# Patient Record
Sex: MALE | Race: WHITE | NOT HISPANIC OR LATINO | Employment: FULL TIME | URBAN - METROPOLITAN AREA
[De-identification: names, ages, dates, MRNs, and addresses within clinical notes are randomized per-mention and may not be internally consistent; named-entity substitution may affect disease eponyms.]

---

## 2017-09-22 ENCOUNTER — OFFICE VISIT (OUTPATIENT)
Dept: ORTHOPEDIC SURGERY | Facility: CLINIC | Age: 57
End: 2017-09-22

## 2017-09-22 VITALS
HEIGHT: 65 IN | BODY MASS INDEX: 30.32 KG/M2 | HEART RATE: 89 BPM | DIASTOLIC BLOOD PRESSURE: 101 MMHG | SYSTOLIC BLOOD PRESSURE: 164 MMHG | WEIGHT: 182 LBS

## 2017-09-22 DIAGNOSIS — M19.079 ARTHRITIS OF ANKLE: Primary | ICD-10-CM

## 2017-09-22 DIAGNOSIS — M17.12 OSTEOARTHROSIS, LOCALIZED, PRIMARY, KNEE, LEFT: ICD-10-CM

## 2017-09-22 PROCEDURE — 99213 OFFICE O/P EST LOW 20 MIN: CPT | Performed by: ORTHOPAEDIC SURGERY

## 2017-09-22 RX ORDER — BRIMONIDINE TARTRATE/TIMOLOL 0.2%-0.5%
DROPS OPHTHALMIC (EYE)
Refills: 0 | COMMUNITY
Start: 2017-08-22

## 2017-09-22 RX ORDER — OLOPATADINE HYDROCHLORIDE 7 MG/ML
SOLUTION OPHTHALMIC
Refills: 0 | COMMUNITY
Start: 2017-06-23 | End: 2019-09-27

## 2017-09-22 RX ORDER — IBUPROFEN 800 MG/1
800 TABLET ORAL 3 TIMES DAILY
Qty: 90 TABLET | Refills: 0 | Status: SHIPPED | OUTPATIENT
Start: 2017-09-22 | End: 2017-11-10 | Stop reason: SDUPTHER

## 2017-09-22 NOTE — PROGRESS NOTES
[facility] – [place], [place] Orthopaedic Surgery Clinic Note    Subjective     Chief Complaint   Patient presents with   • Left Knee - Pain   • Left Ankle - Pain        HPI      Kenn Hawthorne is a 57 y.o. male.  He has a 1 week history of severe left ankle pain and swelling.  He is limping.  It is worse with walking.  It is better icing.  Pain is 9 out of 10.  He's tried a brace.  No previous injury.  Associated with left knee pain.        History reviewed. No pertinent past medical history.   History reviewed. No pertinent surgical history.   Family History   Problem Relation Age of Onset   • Cancer Mother    • Stroke Father      Social History     Social History   • Marital status:      Spouse name: N/A   • Number of children: N/A   • Years of education: N/A     Occupational History   • Not on file.     Social History Main Topics   • Smoking status: Never Smoker   • Smokeless tobacco: Never Used   • Alcohol use Yes      Comment: OCC   • Drug use: No   • Sexual activity: Defer     Other Topics Concern   • Not on file     Social History Narrative   • No narrative on file      No current outpatient prescriptions on file prior to visit.     No current facility-administered medications on file prior to visit.       Allergies   Allergen Reactions   • Latex         The following portions of the patient's history were reviewed and updated as appropriate: allergies, current medications, past family history, past medical history, past social history, past surgical history and problem list.    Review of Systems   Constitutional: Negative.    HENT: Negative.    Eyes: Positive for redness.   Respiratory: Negative.    Cardiovascular: Negative.    Gastrointestinal: Negative.    Endocrine: Negative.    Genitourinary: Negative.    Musculoskeletal: Negative.    Skin: Negative.         -Rash   Allergic/Immunologic: Positive for food allergies.   Neurological: Negative.    Hematological: Negative.    Psychiatric/Behavioral: Negative.      "    Objective      Physical Exam  BP (!) 164/101  Pulse 89  Ht 65\" (165.1 cm)  Wt 182 lb (82.6 kg)  BMI 30.29 kg/m2    Body mass index is 30.29 kg/(m^2).        GENERAL APPEARANCE: awake, alert & oriented x 3, in no acute distress and well developed, well nourished  PSYCH: normal mood andaffect  LUNGS:  breathing nonlabored, no wheezing  EYES: sclera anicteric, pupils equal  CARDIOVASCULAR: palpable pulses dorsalis pedis, palpable posterior tibial bilaterally. Capillary refill less than 2 seconds  INTEGUMENTARY: skin intact, no clubbing, cyanosis  NEUROLOGIC:  Normal gait and balance            Ortho Exam  Peripheral Vascular:    Upper Extremity:   Inspection:  Left--no cyanotic nail beds Right--no cyanotic nail beds   Bilateral:  Pink nail beds with brisk capillary refill   Palpation:  Bilateral radial pulse normal    Musculoskeletal:  Global Assessment:  Overall assessment of Lower Extremity Muscle Strength and Tone:  Left quadriceps--5/5   Left hamstrings--5/5       Left tibialis anterior--5/5  Left gastroc-soleus--5/5  Left EHL --5/5    Lower Extremity:  Knee/Patella:  No digital clubbing or cyanosis.    Examination of left knee reveals:  Normal deep tendon reflexes, coordination, strength, tone, sensation.  No known fractures or deformities.    Inspection and Palpation:  Left knee:  Tenderness:  Over the medial joint line and moderate severity  Effusion:  none  Crepitus:  Positive  Pulses:  2+  Ecchymosis:  None  Warmth:  None     ROM:  Right:  Extension:5    Flexion:120  Left:  Extension:5     Flexion:120    Instability:    Left:  Lachman Test:  Negative, Varus stress test negative, Valgus stress test negative    Deformities/Malalignments/Discrepancies:    Left:  Genu Varum   Right:  No deformity    Functional Testing:  Emil's test:  Negative  Patella grind test:  Positive  Q-angle:  normalPeripheral Vascular:  Lower Extremity:  Inspection:  Left--rapid capillary refill  Palpation:  Dorsalis pedis " pulse:  Left--normal    Neurologic  Sensory:    Light Touch:     Left foot:  Dorsal intact and plantar intact    Overall Assessment of Muscle Strength and Tone:  Lower Extremities:       Left:  Tibialis anterior--5/5    Gastroc soleus--5/5    EHL--5/5    FHL--5/5    Musculoskeletal  Lower Extremity  Ankle/Foot:  Inspection and Palpation:        Left:  Tenderness: Diffuse    Swelling:none    Effusion: 1+  Crepitus:  None     ROM:     Left: Plantarflexion--50    Dorsiflexion--20    Inversion--10    Eversion--10    Instability:          Left: Anterior drawer test--negative    Squeeze test--negative    Imaging/Studies  Imaging Results (last 24 hours)     ** No results found for the last 24 hours. **      I reviewed X-rays left knee and are negative for fracture positive for Xeroform arthritis  I reviewed  X-rays left ankle show significant arthritic changes no acute fracture    Assessment/Plan      Kenn was seen today for pain and pain.    Diagnoses and all orders for this visit:    Arthritis of ankle    Osteoarthrosis, localized, primary, knee, left    Other orders  -     ibuprofen (ADVIL,MOTRIN) 800 MG tablet; Take 1 tablet by mouth 3 (Three) Times a Day.        He will follow up in 1 week    Medical Decision Making  Management Options : over-the-counter medicine  Data/Risk: radiology tests and independent visualization of imaging, lab tests, or EMG/NCV    Eugenio Soler MD  09/22/17  8:56 AM

## 2017-09-29 ENCOUNTER — OFFICE VISIT (OUTPATIENT)
Dept: ORTHOPEDIC SURGERY | Facility: CLINIC | Age: 57
End: 2017-09-29

## 2017-09-29 VITALS
WEIGHT: 182 LBS | DIASTOLIC BLOOD PRESSURE: 108 MMHG | HEIGHT: 66 IN | HEART RATE: 71 BPM | SYSTOLIC BLOOD PRESSURE: 168 MMHG | BODY MASS INDEX: 29.25 KG/M2

## 2017-09-29 DIAGNOSIS — M17.12 OSTEOARTHROSIS, LOCALIZED, PRIMARY, KNEE, LEFT: ICD-10-CM

## 2017-09-29 DIAGNOSIS — M19.079 ARTHRITIS OF ANKLE: Primary | ICD-10-CM

## 2017-09-29 PROCEDURE — 99212 OFFICE O/P EST SF 10 MIN: CPT | Performed by: ORTHOPAEDIC SURGERY

## 2017-09-29 NOTE — PROGRESS NOTES
[facility] – [place] Orthopaedic Surgery Clinic Note    Subjective     No chief complaint on file.       HPI      Kenn Hawthorne is a 57 y.o. male. He Is doing much better pain is 4 out of 10.  The boot and ibuprofen have helped.  It is worse with walking.      History reviewed. No pertinent past medical history.   History reviewed. No pertinent surgical history.   Family History   Problem Relation Age of Onset   • Cancer Mother    • Stroke Father      Social History     Social History   • Marital status:      Spouse name: N/A   • Number of children: N/A   • Years of education: N/A     Occupational History   • Not on file.     Social History Main Topics   • Smoking status: Never Smoker   • Smokeless tobacco: Never Used   • Alcohol use Yes      Comment: OCC   • Drug use: No   • Sexual activity: Defer     Other Topics Concern   • Not on file     Social History Narrative      Current Outpatient Prescriptions on File Prior to Visit   Medication Sig Dispense Refill   • COMBIGAN 0.2-0.5 % ophthalmic solution instill 1 drop into both eyes twice a day  0   • ibuprofen (ADVIL,MOTRIN) 800 MG tablet Take 1 tablet by mouth 3 (Three) Times a Day. 90 tablet 0   • PAZEO 0.7 % solution INSTILL 2 DROPS INTO AFFECTED EYE TWICE A DAY AS NEEDED  0   • triamcinolone (KENALOG) 0.1 % ointment   0     No current facility-administered medications on file prior to visit.       Allergies   Allergen Reactions   • Latex         The following portions of the patient's history were reviewed and updated as appropriate: allergies, current medications, past family history, past medical history, past social history, past surgical history and problem list.    Review of Systems   Constitutional: Negative.    HENT: Negative.    Eyes: Negative.    Respiratory: Negative.    Cardiovascular: Negative.    Gastrointestinal: Negative.    Endocrine: Negative.    Genitourinary: Negative.    Musculoskeletal: Positive for arthralgias and gait problem.   Skin: Negative.   "  Allergic/Immunologic: Negative.    Hematological: Negative.    Psychiatric/Behavioral: Negative.    All other systems reviewed and are negative.       Objective      Physical Exam  BP (!) 168/108  Pulse 71  Ht 66\" (167.6 cm)  Wt 182 lb (82.6 kg)  BMI 29.38 kg/m2    Body mass index is 29.38 kg/(m^2).        GENERAL APPEARANCE: awake, alert & oriented x 3, in no acute distress and well developed, well nourished  PSYCH: normal mood andaffect  LUNGS:  breathing nonlabored, no wheezing  EYES: sclera anicteric, pupils equal  CARDIOVASCULAR: palpable pulses dorsalis pedis, palpable posterior tibial bilaterally. Capillary refill less than 2 seconds  INTEGUMENTARY: skin intact, no clubbing, cyanosis  NEUROLOGIC:  Normal gait and balance            Ortho Exam  Peripheral Vascular:  Lower Extremity:  Inspection:  Left--rapid capillary refill  Palpation:  Dorsalis pedis pulse:  Left--normal    Neurologic  Sensory:    Light Touch:     Left foot:  Dorsal intact and plantar intact    Overall Assessment of Muscle Strength and Tone:  Lower Extremities:       Left:  Tibialis anterior--5/5    Gastroc soleus--5/5    EHL--5/5    FHL--5/5    Musculoskeletal  Lower Extremity  Ankle/Foot:  Inspection and Palpation:        Left:  Tenderness:none    Swelling:none    Effusion:  None    Crepitus:  None     ROM:     Left: Plantarflexion--50    Dorsiflexion--20    Inversion--10    Eversion--10    Instability:          Left: Anterior drawer test--negative    Squeeze test--negative    Imaging/Studies  Imaging Results (last 24 hours)     ** No results found for the last 24 hours. **          Assessment/Plan      Diagnoses and all orders for this visit:    Arthritis of ankle    Osteoarthrosis, localized, primary, knee, left      He will continue with the boot and ibuprofen and follow-up in 2 weeks. if not better consider physical therapy    Medical Decision Making  Management Options : prescription/IM medicine    Eugenio Soler, " MD  09/29/17  9:33 AM

## 2017-10-27 ENCOUNTER — OFFICE VISIT (OUTPATIENT)
Dept: ORTHOPEDIC SURGERY | Facility: CLINIC | Age: 57
End: 2017-10-27

## 2017-10-27 VITALS
HEART RATE: 62 BPM | HEIGHT: 66 IN | DIASTOLIC BLOOD PRESSURE: 99 MMHG | SYSTOLIC BLOOD PRESSURE: 150 MMHG | BODY MASS INDEX: 29.73 KG/M2 | WEIGHT: 185 LBS

## 2017-10-27 DIAGNOSIS — S93.492D SPRAIN OF ANTERIOR TALOFIBULAR LIGAMENT OF LEFT ANKLE, SUBSEQUENT ENCOUNTER: ICD-10-CM

## 2017-10-27 DIAGNOSIS — M19.079 ARTHRITIS OF ANKLE: Primary | ICD-10-CM

## 2017-10-27 PROCEDURE — 99213 OFFICE O/P EST LOW 20 MIN: CPT | Performed by: ORTHOPAEDIC SURGERY

## 2017-10-27 RX ORDER — CYCLOSPORINE 0.5 MG/ML
EMULSION OPHTHALMIC
Refills: 1 | COMMUNITY
Start: 2017-10-23 | End: 2021-08-14 | Stop reason: ALTCHOICE

## 2017-10-27 NOTE — PROGRESS NOTES
[facility] – [place] Orthopaedic Surgery Clinic Note    Subjective     Chief Complaint   Patient presents with   • Follow-up     4 week - Left ankle        HPI      Kenn Hawthorne is a 57 y.o. male.  Left ankle is doing better.  He had a few months.  Worse with walking and better with elevation.  There was ibuprofen 800 mg.  Pain is 3 out of 10 and all.        History reviewed. No pertinent past medical history.   No past surgical history on file.   Family History   Problem Relation Age of Onset   • Cancer Mother    • Stroke Father      Social History     Social History   • Marital status:      Spouse name: N/A   • Number of children: N/A   • Years of education: N/A     Occupational History   • Not on file.     Social History Main Topics   • Smoking status: Never Smoker   • Smokeless tobacco: Never Used   • Alcohol use Yes      Comment: OCC   • Drug use: No   • Sexual activity: Defer     Other Topics Concern   • Not on file     Social History Narrative      Current Outpatient Prescriptions on File Prior to Visit   Medication Sig Dispense Refill   • COMBIGAN 0.2-0.5 % ophthalmic solution instill 1 drop into both eyes twice a day  0   • ibuprofen (ADVIL,MOTRIN) 800 MG tablet Take 1 tablet by mouth 3 (Three) Times a Day. 90 tablet 0   • PAZEO 0.7 % solution INSTILL 2 DROPS INTO AFFECTED EYE TWICE A DAY AS NEEDED  0   • triamcinolone (KENALOG) 0.1 % ointment   0     No current facility-administered medications on file prior to visit.       Allergies   Allergen Reactions   • Latex         The following portions of the patient's history were reviewed and updated as appropriate: allergies, current medications, past family history, past medical history, past social history, past surgical history and problem list.    Review of Systems   Constitutional: Negative.    HENT: Negative.    Eyes: Negative.    Respiratory: Negative.    Cardiovascular: Negative.    Gastrointestinal: Negative.    Endocrine: Negative.    Genitourinary:  "Negative.    Musculoskeletal: Positive for arthralgias.   Skin: Negative.    Allergic/Immunologic: Negative.    Neurological: Negative.    Hematological: Negative.    Psychiatric/Behavioral: Negative.         Objective      Physical Exam  /99  Pulse 62  Ht 66\" (167.6 cm)  Wt 185 lb (83.9 kg)  BMI 29.86 kg/m2    Body mass index is 29.86 kg/(m^2).        GENERAL APPEARANCE: awake, alert & oriented x 3, in no acute distress and well developed, well nourished  PSYCH: normal mood andaffect  LUNGS:  breathing nonlabored, no wheezing  EYES: sclera anicteric, pupils equal  CARDIOVASCULAR: palpable pulses dorsalis pedis, palpable posterior tibial bilaterally. Capillary refill less than 2 seconds  INTEGUMENTARY: skin intact, no clubbing, cyanosis  NEUROLOGIC:  Normal gait and balance            Ortho Exam  Peripheral Vascular:  Lower Extremity:  Inspection:  Left--rapid capillary refill  Palpation:  Dorsalis pedis pulse:  Left--normal    Neurologic  Sensory:    Light Touch:     Left foot:  Dorsal intact and plantar intact    Overall Assessment of Muscle Strength and Tone:  Lower Extremities:       Left:  Tibialis anterior--5/5    Gastroc soleus--5/5    EHL--5/5    FHL--5/5    Musculoskeletal  Lower Extremity  Ankle/Foot:  Inspection and Palpation:        Left:  Tenderness:ATFL, posterior tibialis and Achilles    Swelling:none    Effusion:  None    Crepitus:  None     ROM:     Left: Plantarflexion--50    Dorsiflexion--20    Inversion--10    Eversion--10    Instability:          Left: Anterior drawer test--negative    Squeeze test--negative    Imaging/Studies  Imaging Results (last 24 hours)     ** No results found for the last 24 hours. **          Assessment/Plan      Kenn was seen today for follow-up.    Diagnoses and all orders for this visit:    Arthritis of ankle    Sprain of anterior talofibular ligament of left ankle, subsequent encounter      He will be physical therapy with [facility].  We gave him an ankle " brace and he will follow-up in 3 weeks he will continue ibuprofen    Medical Decision Making  Management Options : prescription/IM medicine and physical/occupational therapy      Eugenio Soler MD  10/27/17  11:01 AM

## 2017-11-12 RX ORDER — IBUPROFEN 800 MG/1
TABLET ORAL
Qty: 90 TABLET | Refills: 0 | Status: SHIPPED | OUTPATIENT
Start: 2017-11-12 | End: 2018-02-02 | Stop reason: SDUPTHER

## 2017-12-06 ENCOUNTER — TELEPHONE (OUTPATIENT)
Dept: CARDIOLOGY | Facility: CLINIC | Age: 57
End: 2017-12-06

## 2017-12-15 ENCOUNTER — OFFICE VISIT (OUTPATIENT)
Dept: ORTHOPEDIC SURGERY | Facility: CLINIC | Age: 57
End: 2017-12-15

## 2017-12-15 VITALS
BODY MASS INDEX: 29.76 KG/M2 | SYSTOLIC BLOOD PRESSURE: 153 MMHG | HEART RATE: 64 BPM | HEIGHT: 66 IN | WEIGHT: 185.19 LBS | DIASTOLIC BLOOD PRESSURE: 97 MMHG

## 2017-12-15 DIAGNOSIS — M19.079 ARTHRITIS OF ANKLE: Primary | ICD-10-CM

## 2017-12-15 PROCEDURE — 99212 OFFICE O/P EST SF 10 MIN: CPT | Performed by: ORTHOPAEDIC SURGERY

## 2017-12-15 RX ORDER — HYDROCHLOROTHIAZIDE 12.5 MG/1
TABLET ORAL
Refills: 0 | COMMUNITY
Start: 2017-11-02 | End: 2019-12-05 | Stop reason: SDUPTHER

## 2017-12-15 NOTE — PROGRESS NOTES
[facility] – [place] Orthopaedic Surgery Clinic Note    Subjective     Chief Complaint   Patient presents with   • Left Ankle - Follow-up     7 week follow up: Sprain of anterior talofibular ligament of left ankle, subsequent encounter, arthritis        HPI      Kenn Hawthorne is a 57 y.o. male.  He is doing much better.  The ankle started hurting on August last visit he was diagnosed with arthritis.  He goes to physical therapy.  Pain is 2 out of 10 and dull.  He believes he is getting better at therapy        History reviewed. No pertinent past medical history.   No past surgical history on file.   Family History   Problem Relation Age of Onset   • Cancer Mother    • Stroke Father      Social History     Social History   • Marital status:      Spouse name: N/A   • Number of children: N/A   • Years of education: N/A     Occupational History   • Not on file.     Social History Main Topics   • Smoking status: Never Smoker   • Smokeless tobacco: Never Used   • Alcohol use Yes      Comment: OCC   • Drug use: No   • Sexual activity: Defer     Other Topics Concern   • Not on file     Social History Narrative      Current Outpatient Prescriptions on File Prior to Visit   Medication Sig Dispense Refill   • COMBIGAN 0.2-0.5 % ophthalmic solution instill 1 drop into both eyes twice a day  0   • ibuprofen (ADVIL,MOTRIN) 800 MG tablet take 1 tablet by mouth three times a day 90 tablet 0   • PAZEO 0.7 % solution INSTILL 2 DROPS INTO AFFECTED EYE TWICE A DAY AS NEEDED  0   • RESTASIS 0.05 % ophthalmic emulsion instill 1 drop into both eyes twice a day  1   • triamcinolone (KENALOG) 0.1 % ointment   0     No current facility-administered medications on file prior to visit.       Allergies   Allergen Reactions   • Latex         The following portions of the patient's history were reviewed and updated as appropriate: allergies, current medications, past family history, past medical history, past social history, past surgical history and  "problem list.    Review of Systems   Constitutional: Negative.    HENT: Negative.    Eyes: Positive for pain, discharge and itching.   Respiratory: Negative.    Cardiovascular: Negative.    Gastrointestinal: Negative.    Endocrine: Negative.    Genitourinary: Negative.    Musculoskeletal: Positive for arthralgias.   Skin: Negative.    Allergic/Immunologic: Positive for environmental allergies and food allergies.   Neurological: Negative.    Hematological: Negative.    Psychiatric/Behavioral: Negative.         Objective      Physical Exam  /97  Pulse 64  Ht 167.6 cm (65.98\")  Wt 84 kg (185 lb 3 oz)  BMI 29.9 kg/m2    Body mass index is 29.9 kg/(m^2).        GENERAL APPEARANCE: awake, alert & oriented x 3, in no acute distress and well developed, well nourished  PSYCH: normal mood andaffect  LUNGS:  breathing nonlabored, no wheezing  EYES: sclera anicteric, pupils equal  CARDIOVASCULAR: palpable pulses dorsalis pedis, palpable posterior tibial bilaterally. Capillary refill less than 2 seconds  INTEGUMENTARY: skin intact, no clubbing, cyanosis  NEUROLOGIC:  Normal gait and balance            Ortho Exam  Peripheral Vascular:  Lower Extremity:  Inspection:  Left--rapid capillary refill  Palpation:  Dorsalis pedis pulse:  Left--normal    Neurologic  Sensory:    Light Touch:     Left foot:  Dorsal intact and plantar intact    Overall Assessment of Muscle Strength and Tone:  Lower Extremities:       Left:  Tibialis anterior--5/5    Gastroc soleus--5/5    EHL--5/5    FHL--5/5    Musculoskeletal  Lower Extremity  Ankle/Foot:  Inspection and Palpation:        Left:  Tenderness:none    Swelling:none    Effusion:  None    Crepitus:  None     ROM:     Left: Plantarflexion--50    Dorsiflexion--20    Inversion--10    Eversion--10    Instability:          Left: Anterior drawer test--negative    Squeeze test--negative    Imaging/Studies  Imaging Results (last 24 hours)     ** No results found for the last 24 hours. **    "       Assessment/Plan      Kenn was seen today for follow-up.    Diagnoses and all orders for this visit:    Arthritis of ankle  -     Ambulatory Referral to Physical Therapy        He would like to continue physical therapy and he will follow-up as needed.    Medical Decision Making  Management Options : over-the-counter medicine and physical/occupational therapy      Eugenio Soler MD  12/15/17  11:59 AM

## 2018-01-05 ENCOUNTER — APPOINTMENT (OUTPATIENT)
Dept: PHYSICAL THERAPY | Facility: HOSPITAL | Age: 58
End: 2018-01-05

## 2018-02-05 RX ORDER — IBUPROFEN 800 MG/1
TABLET ORAL
Qty: 90 TABLET | Refills: 0 | Status: SHIPPED | OUTPATIENT
Start: 2018-02-05 | End: 2018-06-13 | Stop reason: SDUPTHER

## 2018-06-14 RX ORDER — IBUPROFEN 800 MG/1
TABLET ORAL
Qty: 90 TABLET | Refills: 0 | Status: SHIPPED | OUTPATIENT
Start: 2018-06-14 | End: 2018-12-08 | Stop reason: SDUPTHER

## 2018-12-10 RX ORDER — IBUPROFEN 800 MG/1
TABLET ORAL
Qty: 90 TABLET | Refills: 0 | Status: SHIPPED | OUTPATIENT
Start: 2018-12-10 | End: 2019-07-06 | Stop reason: SDUPTHER

## 2019-07-08 RX ORDER — IBUPROFEN 800 MG/1
TABLET ORAL
Qty: 90 TABLET | Refills: 0 | Status: SHIPPED | OUTPATIENT
Start: 2019-07-08 | End: 2020-02-11

## 2019-09-27 ENCOUNTER — OFFICE VISIT (OUTPATIENT)
Dept: INTERNAL MEDICINE | Facility: CLINIC | Age: 59
End: 2019-09-27

## 2019-09-27 VITALS
TEMPERATURE: 98 F | BODY MASS INDEX: 30.99 KG/M2 | WEIGHT: 186 LBS | OXYGEN SATURATION: 97 % | SYSTOLIC BLOOD PRESSURE: 140 MMHG | HEART RATE: 70 BPM | DIASTOLIC BLOOD PRESSURE: 84 MMHG | HEIGHT: 65 IN

## 2019-09-27 DIAGNOSIS — R10.9 LEFT FLANK PAIN: Primary | ICD-10-CM

## 2019-09-27 LAB
BILIRUB BLD-MCNC: NEGATIVE MG/DL
CLARITY, POC: CLEAR
COLOR UR: YELLOW
GLUCOSE UR STRIP-MCNC: NEGATIVE MG/DL
KETONES UR QL: NEGATIVE
LEUKOCYTE EST, POC: NEGATIVE
NITRITE UR-MCNC: NEGATIVE MG/ML
PH UR: 5 [PH] (ref 5–8)
PROT UR STRIP-MCNC: NEGATIVE MG/DL
RBC # UR STRIP: NEGATIVE /UL
SP GR UR: 1.02 (ref 1–1.03)
UROBILINOGEN UR QL: NORMAL

## 2019-09-27 PROCEDURE — 81003 URINALYSIS AUTO W/O SCOPE: CPT | Performed by: NURSE PRACTITIONER

## 2019-09-27 PROCEDURE — 99213 OFFICE O/P EST LOW 20 MIN: CPT | Performed by: NURSE PRACTITIONER

## 2019-09-27 RX ORDER — NAPROXEN 500 MG/1
500 TABLET ORAL 2 TIMES DAILY PRN
Qty: 21 TABLET | Refills: 0 | Status: SHIPPED | OUTPATIENT
Start: 2019-09-27 | End: 2020-02-11

## 2019-09-27 RX ORDER — CYCLOBENZAPRINE HCL 10 MG
TABLET ORAL
Qty: 21 TABLET | Refills: 0 | Status: SHIPPED | OUTPATIENT
Start: 2019-09-27 | End: 2020-01-24 | Stop reason: SDUPTHER

## 2019-09-27 RX ORDER — EPINEPHRINE 0.3 MG/.3ML
INJECTION SUBCUTANEOUS
Refills: 0 | COMMUNITY
Start: 2019-09-20 | End: 2023-01-14

## 2019-10-25 ENCOUNTER — OFFICE VISIT (OUTPATIENT)
Dept: INTERNAL MEDICINE | Facility: CLINIC | Age: 59
End: 2019-10-25

## 2019-10-25 VITALS
HEIGHT: 65 IN | HEART RATE: 78 BPM | DIASTOLIC BLOOD PRESSURE: 72 MMHG | BODY MASS INDEX: 31.16 KG/M2 | WEIGHT: 187 LBS | OXYGEN SATURATION: 98 % | SYSTOLIC BLOOD PRESSURE: 120 MMHG | TEMPERATURE: 98.1 F

## 2019-10-25 DIAGNOSIS — Z00.00 ANNUAL PHYSICAL EXAM: Primary | ICD-10-CM

## 2019-10-25 PROCEDURE — 80061 LIPID PANEL: CPT | Performed by: NURSE PRACTITIONER

## 2019-10-25 PROCEDURE — 80048 BASIC METABOLIC PNL TOTAL CA: CPT | Performed by: NURSE PRACTITIONER

## 2019-10-25 PROCEDURE — 99396 PREV VISIT EST AGE 40-64: CPT | Performed by: NURSE PRACTITIONER

## 2019-10-25 PROCEDURE — G0103 PSA SCREENING: HCPCS | Performed by: NURSE PRACTITIONER

## 2019-10-25 PROCEDURE — 85027 COMPLETE CBC AUTOMATED: CPT | Performed by: NURSE PRACTITIONER

## 2019-10-25 PROCEDURE — 86803 HEPATITIS C AB TEST: CPT | Performed by: NURSE PRACTITIONER

## 2019-10-25 NOTE — PROGRESS NOTES
"Chief Complaint   Patient presents with   • Annual Exam       History of Present Illness    Kenn Hawthorne is a 59 y.o. male who presents today for annual physical exam.  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     The patient is being seen for a health maintenance evaluation.  The last health maintenance was 1 year(s) ago.    Social History  Kenn  does smoke CBD on occasion.   He drinks daily alcohol. 1 drink/day, beer and liquor.  He does not use illicit drugs.    General History  Kenn  does have regular dental visits. 2-3x/year, has apt scheduled for follow-up.  He does complain of vision problems. Last eye exam was July 2019. Only wears readers.  Immunizations are not up to date. The patient needs the following immunizations: Tdap, Shingrix, Influenza    Lifestyle  Kenn  consumes a in general, a \"healthy\" diet  , diet better during the wing. Only eats white meat typically, fresh produce. Drinks a lot of water.  He exercises daily. 20-30 minutes daily.    Reproductive Health  Kenn  is not sexually active.   He does not have erectile dysfunction.     Screening  Last PSA was never performed.  Family history of prostate cancer: none  Last testicular exam was self-performed.    Last colonoscopy was 2011, normal, polyps removed. Family history of colon cancer: mother  Other pertinent family history and/or screenings: none      [facility]    The following portions of the patient's history were reviewed and updated as appropriate: allergies, current medications, past family history, past medical history, past social history, past surgical history and problem list.     Social History     Tobacco Use   • Smoking status: Never Smoker   • Smokeless tobacco: Never Used   Substance Use Topics   • Alcohol use: Yes     Comment: daily beer and/or liquor       Past Medical History:   Diagnosis Date   • Eczema    • Glaucoma, left eye    • Hypertension        History reviewed. No pertinent surgical history.    Family " History   Problem Relation Age of Onset   • Cancer Mother         Colon   • Stroke Father        Allergies   Allergen Reactions   • Latex    • Penicillins Rash         Current Outpatient Medications:   •  COMBIGAN 0.2-0.5 % ophthalmic solution, instill 1 drop into both eyes twice a day, Disp: , Rfl: 0  •  cyclobenzaprine (FLEXERIL) 10 MG tablet, Take 1/2 to 1 tab PO BID PRN, Disp: 21 tablet, Rfl: 0  •  EPINEPHrine (EPIPEN) 0.3 MG/0.3ML solution auto-injector injection, inject 1 PEN INJECTOR intramuscularly as directed, Disp: , Rfl: 0  •  hydrochlorothiazide (HYDRODIURIL) 12.5 MG tablet, take 1 tablet by mouth twice a day, Disp: , Rfl: 0  •  ibuprofen (ADVIL,MOTRIN) 800 MG tablet, take 1 tablet by mouth three times a day, Disp: 90 tablet, Rfl: 0  •  naproxen (NAPROSYN) 500 MG tablet, Take 1 tablet by mouth 2 (Two) Times a Day As Needed for Mild Pain  or Moderate Pain ., Disp: 21 tablet, Rfl: 0  •  RESTASIS 0.05 % ophthalmic emulsion, instill 1 drop into both eyes twice a day, Disp: , Rfl: 1  •  TOBRADEX 0.3-0.1 % ophthalmic ointment, , Disp: , Rfl: 0  •  triamcinolone (KENALOG) 0.1 % ointment, , Disp: , Rfl: 0    Review of Systems  Review of Systems   Constitutional: Negative for appetite change, chills, diaphoresis, fatigue and fever.   HENT: Negative for congestion, ear pain, postnasal drip, rhinorrhea, sinus pressure, sinus pain, sore throat and trouble swallowing.    Eyes: Negative for pain, discharge, redness, itching and visual disturbance.   Respiratory: Negative for cough, chest tightness, shortness of breath and wheezing.    Cardiovascular: Negative for chest pain, palpitations and leg swelling.   Gastrointestinal: Negative for abdominal pain, blood in stool, constipation, diarrhea, nausea and vomiting.   Endocrine: Negative for cold intolerance, heat intolerance, polydipsia, polyphagia and polyuria.   Genitourinary: Negative for dysuria and hematuria.   Musculoskeletal: Negative for arthralgias, back pain  "and myalgias.   Skin: Negative for color change, rash and wound.   Allergic/Immunologic: Negative for environmental allergies and food allergies.   Neurological: Negative for dizziness, weakness, light-headedness, numbness and headaches.   Hematological: Does not bruise/bleed easily.   Psychiatric/Behavioral: Negative for confusion, dysphoric mood and sleep disturbance. The patient is not nervous/anxious.        Vitals:  Vitals:    10/25/19 1615   BP: 120/72   Pulse: 78   Temp: 98.1 °F (36.7 °C)   TempSrc: Oral   SpO2: 98%   Weight: 84.8 kg (187 lb)   Height: 165.1 cm (65\")       Physical Exam  Physical Exam   Constitutional: He is oriented to person, place, and time. Vital signs are normal. He appears well-developed and well-nourished. No distress.   HENT:   Head: Normocephalic and atraumatic.   Right Ear: External ear and ear canal normal. Tympanic membrane is not injected, not scarred, not perforated, not erythematous, not retracted and not bulging. A middle ear effusion is present.   Left Ear: External ear and ear canal normal. Tympanic membrane is not injected, not scarred, not perforated, not erythematous, not retracted and not bulging. A middle ear effusion is present.   Nose: Mucosal edema present. No rhinorrhea, sinus tenderness or congestion. Right sinus exhibits no maxillary sinus tenderness and no frontal sinus tenderness. Left sinus exhibits no maxillary sinus tenderness and no frontal sinus tenderness.   Mouth/Throat: Uvula is midline, oropharynx is clear and moist and mucous membranes are normal.   Eyes: Conjunctivae, EOM and lids are normal. Pupils are equal, round, and reactive to light.   Neck: Trachea normal, normal range of motion and phonation normal. Neck supple. Carotid bruit is not present. No tracheal deviation present. No thyroid mass and no thyromegaly present.   Cardiovascular: Normal rate, regular rhythm, normal heart sounds and intact distal pulses.   No murmur heard.  Pulses:       " Radial pulses are 2+ on the right side, and 2+ on the left side.        Dorsalis pedis pulses are 2+ on the right side, and 2+ on the left side.   No edema   Pulmonary/Chest: Effort normal and breath sounds normal. No respiratory distress. He has no decreased breath sounds. He has no wheezes. He exhibits no tenderness.   Abdominal: Soft. Normal appearance and bowel sounds are normal. He exhibits no distension and no mass. There is no hepatosplenomegaly. There is no tenderness. There is no rebound, no guarding and no CVA tenderness. No hernia.   Musculoskeletal: Normal range of motion. He exhibits no edema, tenderness or deformity.   Lymphadenopathy:        Head (right side): No submental, no submandibular, no tonsillar, no preauricular, no posterior auricular and no occipital adenopathy present.        Head (left side): No submental, no submandibular, no tonsillar, no preauricular, no posterior auricular and no occipital adenopathy present.     He has no cervical adenopathy.   Neurological: He is alert and oriented to person, place, and time. He has normal strength. No cranial nerve deficit. Coordination and gait normal.   Reflex Scores:       Patellar reflexes are 2+ on the right side and 2+ on the left side.  Skin: Skin is warm, dry and intact. Capillary refill takes 2 to 3 seconds. No rash noted.   Psychiatric: He has a normal mood and affect. His speech is normal and behavior is normal. Judgment and thought content normal. Cognition and memory are normal.   Nursing note and vitals reviewed.      Labs  Screening lab work ordered today    Assessment/Plan    Kenn was seen today for annual exam.    Diagnoses and all orders for this visit:    Annual physical exam  -     CBC (No Diff)  -     Basic metabolic panel  -     Lipid Panel  -     PSA SCREENING  -     Hepatitis C antibody    The patient is here for an annual health maintenance visit.  Currently, the patient consumes a healthy diet and has an adequate exercise  regimen. Screening lab work is ordered.  Immunizations discussed for health maintenance include: Shingrix, Tdap, Influenza. Advice and education is given regarding nutrition, aerobic exercise, routine dental evaluations, routine eye exams, reproductive health, cardiovascular risk reduction, sunscreen use, self skin examination (annual dermatology evaluations) and seat belt use (general overall safety).  Further recommendations after lab evaluation.  Annual wellness evaluations recommended.     Plan of care reviewed with patient at conclusion of today's visit. Patient education was provided regarding diagnosis, management, and prescribed or recommended OTC medications. Patient was informed to notify office of any new, worsening, or persistent symptoms. Patient verbalized understanding and agreement with plan of care.     Follow-Up  Return in about 1 year (around 10/25/2020) for Annual.    Electronically Signed By:  TIANNA Alonso

## 2019-10-26 LAB
ANION GAP SERPL CALCULATED.3IONS-SCNC: 12.4 MMOL/L (ref 5–15)
BUN BLD-MCNC: 21 MG/DL (ref 6–20)
BUN/CREAT SERPL: 18.9 (ref 7–25)
CALCIUM SPEC-SCNC: 9.3 MG/DL (ref 8.6–10.5)
CHLORIDE SERPL-SCNC: 95 MMOL/L (ref 98–107)
CHOLEST SERPL-MCNC: 337 MG/DL (ref 0–200)
CO2 SERPL-SCNC: 27.6 MMOL/L (ref 22–29)
CREAT BLD-MCNC: 1.11 MG/DL (ref 0.76–1.27)
DEPRECATED RDW RBC AUTO: 52.2 FL (ref 37–54)
ERYTHROCYTE [DISTWIDTH] IN BLOOD BY AUTOMATED COUNT: 17.8 % (ref 12.3–15.4)
GFR SERPL CREATININE-BSD FRML MDRD: 68 ML/MIN/1.73
GLUCOSE BLD-MCNC: 120 MG/DL (ref 65–99)
HCT VFR BLD AUTO: 39.6 % (ref 37.5–51)
HCV AB SER DONR QL: NORMAL
HDLC SERPL-MCNC: 97 MG/DL (ref 40–60)
HGB BLD-MCNC: 13.5 G/DL (ref 13–17.7)
LDLC SERPL CALC-MCNC: 191 MG/DL (ref 0–100)
LDLC/HDLC SERPL: 1.97 {RATIO}
MCH RBC QN AUTO: 28.1 PG (ref 26.6–33)
MCHC RBC AUTO-ENTMCNC: 34.1 G/DL (ref 31.5–35.7)
MCV RBC AUTO: 82.5 FL (ref 79–97)
PLATELET # BLD AUTO: 231 10*3/MM3 (ref 140–450)
PMV BLD AUTO: 10 FL (ref 6–12)
POTASSIUM BLD-SCNC: 4.1 MMOL/L (ref 3.5–5.2)
PSA SERPL-MCNC: 1.42 NG/ML (ref 0–4)
RBC # BLD AUTO: 4.8 10*6/MM3 (ref 4.14–5.8)
SODIUM BLD-SCNC: 135 MMOL/L (ref 136–145)
TRIGL SERPL-MCNC: 244 MG/DL (ref 0–150)
VLDLC SERPL-MCNC: 48.8 MG/DL (ref 5–40)
WBC NRBC COR # BLD: 7.55 10*3/MM3 (ref 3.4–10.8)

## 2019-12-05 ENCOUNTER — TELEPHONE (OUTPATIENT)
Dept: INTERNAL MEDICINE | Facility: CLINIC | Age: 59
End: 2019-12-05

## 2019-12-05 DIAGNOSIS — I10 HYPERTENSION, UNSPECIFIED TYPE: Primary | ICD-10-CM

## 2019-12-05 RX ORDER — HYDROCHLOROTHIAZIDE 12.5 MG/1
12.5 TABLET ORAL 2 TIMES DAILY
Qty: 60 TABLET | Refills: 0 | Status: SHIPPED | OUTPATIENT
Start: 2019-12-05 | End: 2020-01-25

## 2019-12-05 NOTE — TELEPHONE ENCOUNTER
Patient was calling to get a refill on his hydrochlorothiazide (hydrodiuril) 12.5 mg tablet. Patient has recently switched to [facility] and was previously having his medications filled at the old office but now he needs to have this office call this in to his confirmed pharmacy [facility] [address] [facility]. Patient only has 3 doses left which he will need it refilled by the weekend so he will have the needed dose for Monday please call patient with any questions or concerns [phone]

## 2019-12-30 ENCOUNTER — FLU SHOT (OUTPATIENT)
Dept: INTERNAL MEDICINE | Facility: CLINIC | Age: 59
End: 2019-12-30

## 2019-12-30 DIAGNOSIS — Z23 NEED FOR INFLUENZA VACCINATION: ICD-10-CM

## 2019-12-30 PROCEDURE — 90674 CCIIV4 VAC NO PRSV 0.5 ML IM: CPT | Performed by: NURSE PRACTITIONER

## 2019-12-30 PROCEDURE — 90471 IMMUNIZATION ADMIN: CPT | Performed by: NURSE PRACTITIONER

## 2020-01-24 ENCOUNTER — APPOINTMENT (OUTPATIENT)
Dept: LAB | Facility: HOSPITAL | Age: 60
End: 2020-01-24

## 2020-01-24 ENCOUNTER — OFFICE VISIT (OUTPATIENT)
Dept: INTERNAL MEDICINE | Facility: CLINIC | Age: 60
End: 2020-01-24

## 2020-01-24 VITALS
BODY MASS INDEX: 31.16 KG/M2 | OXYGEN SATURATION: 100 % | DIASTOLIC BLOOD PRESSURE: 90 MMHG | WEIGHT: 187 LBS | SYSTOLIC BLOOD PRESSURE: 140 MMHG | HEIGHT: 65 IN | HEART RATE: 98 BPM

## 2020-01-24 DIAGNOSIS — R10.12 LEFT UPPER QUADRANT PAIN: ICD-10-CM

## 2020-01-24 DIAGNOSIS — R10.11 RIGHT UPPER QUADRANT ABDOMINAL PAIN: ICD-10-CM

## 2020-01-24 DIAGNOSIS — R10.9 LEFT FLANK PAIN: Primary | ICD-10-CM

## 2020-01-24 DIAGNOSIS — I10 HYPERTENSION, UNSPECIFIED TYPE: ICD-10-CM

## 2020-01-24 LAB
ALBUMIN SERPL-MCNC: 4.5 G/DL (ref 3.5–5.2)
ALBUMIN/GLOB SERPL: 1.6 G/DL
ALP SERPL-CCNC: 71 U/L (ref 39–117)
ALT SERPL W P-5'-P-CCNC: 20 U/L (ref 1–41)
AMYLASE SERPL-CCNC: 71 U/L (ref 28–100)
ANION GAP SERPL CALCULATED.3IONS-SCNC: 16.7 MMOL/L (ref 5–15)
AST SERPL-CCNC: 25 U/L (ref 1–40)
BASOPHILS # BLD AUTO: 0.04 10*3/MM3 (ref 0–0.2)
BASOPHILS NFR BLD AUTO: 0.6 % (ref 0–1.5)
BILIRUB BLD-MCNC: NEGATIVE MG/DL
BILIRUB SERPL-MCNC: 0.6 MG/DL (ref 0.2–1.2)
BUN BLD-MCNC: 18 MG/DL (ref 6–20)
BUN/CREAT SERPL: 17.8 (ref 7–25)
CALCIUM SPEC-SCNC: 9.8 MG/DL (ref 8.6–10.5)
CHLORIDE SERPL-SCNC: 95 MMOL/L (ref 98–107)
CLARITY, POC: CLEAR
CO2 SERPL-SCNC: 27.3 MMOL/L (ref 22–29)
COLOR UR: YELLOW
CREAT BLD-MCNC: 1.01 MG/DL (ref 0.76–1.27)
CRP SERPL-MCNC: 2.27 MG/DL (ref 0–0.5)
DEPRECATED RDW RBC AUTO: 44.7 FL (ref 37–54)
EOSINOPHIL # BLD AUTO: 0.12 10*3/MM3 (ref 0–0.4)
EOSINOPHIL NFR BLD AUTO: 1.7 % (ref 0.3–6.2)
ERYTHROCYTE [DISTWIDTH] IN BLOOD BY AUTOMATED COUNT: 14.9 % (ref 12.3–15.4)
ERYTHROCYTE [SEDIMENTATION RATE] IN BLOOD: 30 MM/HR (ref 0–20)
GFR SERPL CREATININE-BSD FRML MDRD: 76 ML/MIN/1.73
GLOBULIN UR ELPH-MCNC: 2.8 GM/DL
GLUCOSE BLD-MCNC: 115 MG/DL (ref 65–99)
GLUCOSE UR STRIP-MCNC: NEGATIVE MG/DL
HCT VFR BLD AUTO: 40.7 % (ref 37.5–51)
HGB BLD-MCNC: 13.3 G/DL (ref 13–17.7)
IMM GRANULOCYTES # BLD AUTO: 0.05 10*3/MM3 (ref 0–0.05)
IMM GRANULOCYTES NFR BLD AUTO: 0.7 % (ref 0–0.5)
KETONES UR QL: NEGATIVE
LEUKOCYTE EST, POC: NEGATIVE
LYMPHOCYTES # BLD AUTO: 1.21 10*3/MM3 (ref 0.7–3.1)
LYMPHOCYTES NFR BLD AUTO: 17.2 % (ref 19.6–45.3)
MCH RBC QN AUTO: 27.5 PG (ref 26.6–33)
MCHC RBC AUTO-ENTMCNC: 32.7 G/DL (ref 31.5–35.7)
MCV RBC AUTO: 84.1 FL (ref 79–97)
MONOCYTES # BLD AUTO: 0.87 10*3/MM3 (ref 0.1–0.9)
MONOCYTES NFR BLD AUTO: 12.4 % (ref 5–12)
NEUTROPHILS # BLD AUTO: 4.73 10*3/MM3 (ref 1.7–7)
NEUTROPHILS NFR BLD AUTO: 67.4 % (ref 42.7–76)
NITRITE UR-MCNC: NEGATIVE MG/ML
NRBC BLD AUTO-RTO: 0 /100 WBC (ref 0–0.2)
PH UR: 5.5 [PH] (ref 5–8)
PLATELET # BLD AUTO: 296 10*3/MM3 (ref 140–450)
PMV BLD AUTO: 10.4 FL (ref 6–12)
POTASSIUM BLD-SCNC: 4 MMOL/L (ref 3.5–5.2)
PROT SERPL-MCNC: 7.3 G/DL (ref 6–8.5)
PROT UR STRIP-MCNC: NEGATIVE MG/DL
RBC # BLD AUTO: 4.84 10*6/MM3 (ref 4.14–5.8)
RBC # UR STRIP: NEGATIVE /UL
SODIUM BLD-SCNC: 139 MMOL/L (ref 136–145)
SP GR UR: 1.02 (ref 1–1.03)
UROBILINOGEN UR QL: NORMAL
WBC NRBC COR # BLD: 7.02 10*3/MM3 (ref 3.4–10.8)

## 2020-01-24 PROCEDURE — 99214 OFFICE O/P EST MOD 30 MIN: CPT | Performed by: NURSE PRACTITIONER

## 2020-01-24 PROCEDURE — 85025 COMPLETE CBC W/AUTO DIFF WBC: CPT | Performed by: NURSE PRACTITIONER

## 2020-01-24 PROCEDURE — 86140 C-REACTIVE PROTEIN: CPT | Performed by: NURSE PRACTITIONER

## 2020-01-24 PROCEDURE — 82150 ASSAY OF AMYLASE: CPT | Performed by: NURSE PRACTITIONER

## 2020-01-24 PROCEDURE — 81003 URINALYSIS AUTO W/O SCOPE: CPT | Performed by: NURSE PRACTITIONER

## 2020-01-24 PROCEDURE — 85652 RBC SED RATE AUTOMATED: CPT | Performed by: NURSE PRACTITIONER

## 2020-01-24 PROCEDURE — 80053 COMPREHEN METABOLIC PANEL: CPT | Performed by: NURSE PRACTITIONER

## 2020-01-24 PROCEDURE — 36415 COLL VENOUS BLD VENIPUNCTURE: CPT | Performed by: NURSE PRACTITIONER

## 2020-01-24 RX ORDER — CYCLOBENZAPRINE HCL 10 MG
TABLET ORAL
Qty: 21 TABLET | Refills: 0 | Status: SHIPPED | OUTPATIENT
Start: 2020-01-24 | End: 2020-06-18

## 2020-01-24 NOTE — PROGRESS NOTES
Chief Complaint   Patient presents with   • Flank Pain     radiating to anterior rib cage       History of Present Illness  59 y.o.male presents for flank pain.  Left sided flank that is radiating left lateral and around into left upper abdomen at the level of his rib cage. Has been happening since Oct 2019. It has never really gone away it is always the sometimes worse/better. Was seen in Oct with mild relief with nsaids and flexaril. This time he was running up steps when it got significantly worse. Pain is severe 10/10. Just breathing heavy hurts; movement makes worse. Associated symptoms include headaches and nausea. Pertinent negatives include no abdominal pain, chest pain, constipation, diarrhea, dysuria, fatigue, fever, shortness of breath, urinary symptoms, or known fever. Pt does drink significant alcohol (at least 1 beer daily and a couple of stronger alcoholic drinks). Pt takes Dupixent medication for his eczema once every two weeks; had a dose yesterday.  Colonoscopy due 2021      Review of Systems   Constitutional: Negative for chills, diaphoresis, fatigue and fever.   Eyes: Negative for visual disturbance.   Respiratory: Negative for chest tightness and shortness of breath.    Cardiovascular: Negative for chest pain.   Gastrointestinal: Positive for abdominal distention and nausea. Negative for abdominal pain, constipation, diarrhea, vomiting and indigestion.   Endocrine: Positive for polyuria.   Genitourinary: Positive for flank pain. Negative for difficulty urinating, dysuria and frequency.   Musculoskeletal: Positive for back pain.   Skin: Negative for bruise.   Neurological: Positive for headache. Negative for dizziness, syncope and light-headedness.   Psychiatric/Behavioral: Negative for dysphoric mood and depressed mood.         [facility]  The following portions of the patient's history were reviewed and updated as appropriate: allergies, current medications, past family history, past medical  "history, past social history, past surgical history and problem list.     Past Medical History:   Diagnosis Date   • Eczema    • Glaucoma, left eye    • Hypertension       History reviewed. No pertinent surgical history.   Allergies   Allergen Reactions   • Latex    • Penicillins Rash      Social History     Socioeconomic History   • Marital status:      Spouse name: Not on file   • Number of children: Not on file   • Years of education: Not on file   • Highest education level: Not on file   Tobacco Use   • Smoking status: Never Smoker   • Smokeless tobacco: Never Used   Substance and Sexual Activity   • Alcohol use: Yes     Comment: daily beer and/or liquor   • Drug use: No   • Sexual activity: Not Currently     Family History   Problem Relation Age of Onset   • Cancer Mother         Colon   • Stroke Father             Current Outpatient Medications:   •  COMBIGAN 0.2-0.5 % ophthalmic solution, instill 1 drop into both eyes twice a day, Disp: , Rfl: 0  •  cyclobenzaprine (FLEXERIL) 10 MG tablet, Take 1/2 to 1 tab PO BID PRN, Disp: 21 tablet, Rfl: 0  •  EPINEPHrine (EPIPEN) 0.3 MG/0.3ML solution auto-injector injection, inject 1 PEN INJECTOR intramuscularly as directed, Disp: , Rfl: 0  •  hydroCHLOROthiazide (HYDRODIURIL) 12.5 MG tablet, Take 1 tablet by mouth 2 (Two) Times a Day., Disp: 60 tablet, Rfl: 0  •  ibuprofen (ADVIL,MOTRIN) 800 MG tablet, take 1 tablet by mouth three times a day, Disp: 90 tablet, Rfl: 0  •  naproxen (NAPROSYN) 500 MG tablet, Take 1 tablet by mouth 2 (Two) Times a Day As Needed for Mild Pain  or Moderate Pain ., Disp: 21 tablet, Rfl: 0  •  RESTASIS 0.05 % ophthalmic emulsion, instill 1 drop into both eyes twice a day, Disp: , Rfl: 1  •  TOBRADEX 0.3-0.1 % ophthalmic ointment, , Disp: , Rfl: 0  •  triamcinolone (KENALOG) 0.1 % ointment, , Disp: , Rfl: 0    VITALS:  /90   Pulse 98   Ht 165.1 cm (65\")   Wt 84.8 kg (187 lb)   SpO2 100%   BMI 31.12 kg/m²     Physical Exam "   Constitutional: He is oriented to person, place, and time. He appears well-developed and well-nourished. No distress.   HENT:   Head: Normocephalic and atraumatic.   Eyes: Lids are normal.   Eyes appeared dry with yellowing of sclera   Cardiovascular: Normal rate, regular rhythm and normal heart sounds.   No murmur heard.  Pulmonary/Chest: Effort normal and breath sounds normal. No respiratory distress.   Abdominal: Soft. Bowel sounds are normal. He exhibits no distension. There is tenderness in the right upper quadrant and left upper quadrant. There is CVA tenderness. There is no rigidity, no rebound and no guarding.   Tenderness at the right and left anterior abdomen at the level of lower rib cage. Mild hepatic enlargement. Mild left CVA tenderness.    Musculoskeletal: He exhibits tenderness. He exhibits no edema or deformity.   Movement is difficult for pt due to level of pain he is experiencing. Tenderness at left flank radiating to anterior rib cage   Lymphadenopathy:     He has no cervical adenopathy.   Neurological: He is alert and oriented to person, place, and time.   Skin: Skin is warm and dry. No rash noted.   Psychiatric: He has a normal mood and affect. His behavior is normal.   Nursing note and vitals reviewed.      LABS  Results for orders placed or performed in visit on 01/24/20   Comprehensive Metabolic Panel   Result Value Ref Range    Glucose 115 (H) 65 - 99 mg/dL    BUN 18 6 - 20 mg/dL    Creatinine 1.01 0.76 - 1.27 mg/dL    Sodium 139 136 - 145 mmol/L    Potassium 4.0 3.5 - 5.2 mmol/L    Chloride 95 (L) 98 - 107 mmol/L    CO2 27.3 22.0 - 29.0 mmol/L    Calcium 9.8 8.6 - 10.5 mg/dL    Total Protein 7.3 6.0 - 8.5 g/dL    Albumin 4.50 3.50 - 5.20 g/dL    ALT (SGPT) 20 1 - 41 U/L    AST (SGOT) 25 1 - 40 U/L    Alkaline Phosphatase 71 39 - 117 U/L    Total Bilirubin 0.6 0.2 - 1.2 mg/dL    eGFR Non African Amer 76 >60 mL/min/1.73    Globulin 2.8 gm/dL    A/G Ratio 1.6 g/dL    BUN/Creatinine Ratio  17.8 7.0 - 25.0    Anion Gap 16.7 (H) 5.0 - 15.0 mmol/L   Amylase   Result Value Ref Range    Amylase 71 28 - 100 U/L   Sedimentation Rate   Result Value Ref Range    Sed Rate 30 (H) 0 - 20 mm/hr   C-reactive Protein   Result Value Ref Range    C-Reactive Protein 2.27 (H) 0.00 - 0.50 mg/dL   CBC Auto Differential   Result Value Ref Range    WBC 7.02 3.40 - 10.80 10*3/mm3    RBC 4.84 4.14 - 5.80 10*6/mm3    Hemoglobin 13.3 13.0 - 17.7 g/dL    Hematocrit 40.7 37.5 - 51.0 %    MCV 84.1 79.0 - 97.0 fL    MCH 27.5 26.6 - 33.0 pg    MCHC 32.7 31.5 - 35.7 g/dL    RDW 14.9 12.3 - 15.4 %    RDW-SD 44.7 37.0 - 54.0 fl    MPV 10.4 6.0 - 12.0 fL    Platelets 296 140 - 450 10*3/mm3    Neutrophil % 67.4 42.7 - 76.0 %    Lymphocyte % 17.2 (L) 19.6 - 45.3 %    Monocyte % 12.4 (H) 5.0 - 12.0 %    Eosinophil % 1.7 0.3 - 6.2 %    Basophil % 0.6 0.0 - 1.5 %    Immature Grans % 0.7 (H) 0.0 - 0.5 %    Neutrophils, Absolute 4.73 1.70 - 7.00 10*3/mm3    Lymphocytes, Absolute 1.21 0.70 - 3.10 10*3/mm3    Monocytes, Absolute 0.87 0.10 - 0.90 10*3/mm3    Eosinophils, Absolute 0.12 0.00 - 0.40 10*3/mm3    Basophils, Absolute 0.04 0.00 - 0.20 10*3/mm3    Immature Grans, Absolute 0.05 0.00 - 0.05 10*3/mm3    nRBC 0.0 0.0 - 0.2 /100 WBC   POC Urinalysis Dipstick, Automated   Result Value Ref Range    Color Yellow Yellow, Straw, Dark Yellow, [place]    Clarity, UA Clear Clear    Specific Gravity  1.025 1.005 - 1.030    pH, Urine 5.5 5.0 - 8.0    Leukocytes Negative Negative    Nitrite, UA Negative Negative    Protein, POC Negative Negative mg/dL    Glucose, UA Negative Negative, 1000 mg/dL (3+) mg/dL    Ketones, UA Negative Negative    Urobilinogen, UA Normal Normal    Bilirubin Negative Negative    Blood, UA Negative Negative       ASSESSMENT/PLAN  Kenn was seen today for flank pain.    Diagnoses and all orders for this visit:    Left flank pain  -     CBC & Differential  -     Comprehensive Metabolic Panel  -     Amylase  -     POC Urinalysis  Dipstick, Automated  -     Sedimentation Rate  -     C-reactive Protein  -     cyclobenzaprine (FLEXERIL) 10 MG tablet; Take 1/2 to 1 tab PO BID PRN  -     CBC Auto Differential  -     CT Abdomen Pelvis With & Without Contrast; Future    Left upper quadrant pain  -     CBC & Differential  -     Comprehensive Metabolic Panel  -     Amylase  -     CBC Auto Differential  -     CT Abdomen Pelvis With & Without Contrast; Future    Right upper quadrant abdominal pain  -     Comprehensive Metabolic Panel  -     CT Abdomen Pelvis With & Without Contrast; Future      Instructed pt to avoid Acetaminophen. May take 200-400 mg Ibuprofen if need.   RX given for Flexeril to possibly help pain, instructed pt that he may experience drowsiness.      will contact him with lab results; and additional dx of ct scan to be scheduled. FU with pcp in 2 weeks. Consider updating colonoscopy if nothing significant on ct scan.    I discussed the patients findings and my recommendations with patient.  Patient was encouraged to keep me informed of any acute changes, lack of improvement, or any new concerning symptoms.  Patient voiced understanding of all instructions and denied further questions.      FOLLOW-UP  Return in about 2 weeks (around 2/7/2020), or if symptoms worsen or fail to improve, for Recheck with pcp.    Electronically signed by:    TIANNA Dodge  01/24/2020

## 2020-01-25 RX ORDER — HYDROCHLOROTHIAZIDE 12.5 MG/1
TABLET ORAL
Qty: 60 TABLET | Refills: 0 | Status: SHIPPED | OUTPATIENT
Start: 2020-01-25 | End: 2020-03-19

## 2020-01-27 ENCOUNTER — OFFICE VISIT (OUTPATIENT)
Dept: INTERNAL MEDICINE | Facility: CLINIC | Age: 60
End: 2020-01-27

## 2020-01-27 VITALS
BODY MASS INDEX: 31.16 KG/M2 | TEMPERATURE: 98.7 F | SYSTOLIC BLOOD PRESSURE: 130 MMHG | DIASTOLIC BLOOD PRESSURE: 90 MMHG | OXYGEN SATURATION: 99 % | WEIGHT: 187 LBS | HEART RATE: 99 BPM | HEIGHT: 65 IN

## 2020-01-27 DIAGNOSIS — R10.9 LEFT FLANK PAIN: Primary | ICD-10-CM

## 2020-01-27 PROCEDURE — 99213 OFFICE O/P EST LOW 20 MIN: CPT | Performed by: NURSE PRACTITIONER

## 2020-01-27 NOTE — PROGRESS NOTES
Chief Complaint   Patient presents with   • Flank Pain       History of Present Illness    Kenn Hawthorne is a 59 y.o. male who presents today for persistent abdominal pain. Patient was seen in office 4 days ago for similar pain that has not subsided with prescribed regimen. He was initially seen for flank pain in office about 4 months ago, pain was instigated after 2 mile runs and improved with muscle relaxer at that time. Now pain worse with walking, feeling it with every step. Changes in position from sitting to standing, extending arms, and bending forward cause it to be worse. Pain starts left flank and raidating to left anterior abdominal wall. Described as sharp pain when flared up 4 days ago, lasting 5-6 seconds then went away, off and on. Now a throbbing pain lasting longer. He is taking Ibuprofen 400 mg BID, and Flexeril 5 mg BID with mild relief. Denies fever, chills, headache, body aches, changes in bowel or bladder function, chest pain, palpitations, urinary symptoms.       [facility]    The following portions of the patient's history were reviewed and updated as appropriate: allergies, current medications, past family history, past medical history, past social history, past surgical history and problem list.     Social History     Tobacco Use   • Smoking status: Never Smoker   • Smokeless tobacco: Never Used   Substance Use Topics   • Alcohol use: Yes     Comment: daily beer and/or liquor       Past Medical History:   Diagnosis Date   • Eczema    • Glaucoma, left eye    • Hypertension        History reviewed. No pertinent surgical history.    Family History   Problem Relation Age of Onset   • Cancer Mother         Colon   • Stroke Father        Allergies   Allergen Reactions   • Latex    • Penicillins Rash         Current Outpatient Medications:   •  COMBIGAN 0.2-0.5 % ophthalmic solution, instill 1 drop into both eyes twice a day, Disp: , Rfl: 0  •  cyclobenzaprine (FLEXERIL) 10 MG tablet, Take 1/2 to 1  "tab PO BID PRN, Disp: 21 tablet, Rfl: 0  •  EPINEPHrine (EPIPEN) 0.3 MG/0.3ML solution auto-injector injection, inject 1 PEN INJECTOR intramuscularly as directed, Disp: , Rfl: 0  •  hydroCHLOROthiazide (HYDRODIURIL) 12.5 MG tablet, take 1 tablet by mouth twice a day, Disp: 60 tablet, Rfl: 0  •  ibuprofen (ADVIL,MOTRIN) 800 MG tablet, take 1 tablet by mouth three times a day, Disp: 90 tablet, Rfl: 0  •  naproxen (NAPROSYN) 500 MG tablet, Take 1 tablet by mouth 2 (Two) Times a Day As Needed for Mild Pain  or Moderate Pain ., Disp: 21 tablet, Rfl: 0  •  RESTASIS 0.05 % ophthalmic emulsion, instill 1 drop into both eyes twice a day, Disp: , Rfl: 1  •  TOBRADEX 0.3-0.1 % ophthalmic ointment, , Disp: , Rfl: 0  •  triamcinolone (KENALOG) 0.1 % ointment, , Disp: , Rfl: 0    Review of Systems  Review of Systems   Constitutional: Negative for appetite change, chills, diaphoresis, fatigue and fever.   Eyes: Negative for visual disturbance.   Respiratory: Negative for cough, chest tightness, shortness of breath and wheezing.    Cardiovascular: Negative for chest pain and palpitations.   Gastrointestinal: Positive for abdominal pain. Negative for abdominal distention, blood in stool, constipation, diarrhea, nausea and vomiting.   Musculoskeletal: Negative for myalgias.   Neurological: Negative for dizziness, light-headedness, numbness and headaches.   Psychiatric/Behavioral: Negative for sleep disturbance.       Vitals:  Vitals:    01/27/20 1404   BP: 130/90   Pulse: 99   Temp: 98.7 °F (37.1 °C)   SpO2: 99%   Weight: 84.8 kg (187 lb)   Height: 165.1 cm (65\")   PainSc:   6   PainLoc: Abdomen       Physical Exam  Physical Exam   Constitutional: He is oriented to person, place, and time. Vital signs are normal. He appears well-developed and well-nourished.   Cardiovascular: Normal rate, regular rhythm and normal heart sounds.   Pulmonary/Chest: Effort normal. He has no decreased breath sounds. He has no wheezes. He has no rhonchi. " He has no rales.   Abdominal: Soft. Normal appearance and bowel sounds are normal. He exhibits no shifting dullness, no distension, no abdominal bruit, no pulsatile midline mass and no mass. There is no hepatosplenomegaly. There is no tenderness. There is no rigidity, no rebound, no guarding and no CVA tenderness. No hernia.   Neurological: He is alert and oriented to person, place, and time.   Skin: Skin is warm and dry.   Psychiatric: He has a normal mood and affect. His behavior is normal.   Nursing note and vitals reviewed.      Labs  Results for orders placed or performed in visit on 01/24/20   Comprehensive Metabolic Panel   Result Value Ref Range    Glucose 115 (H) 65 - 99 mg/dL    BUN 18 6 - 20 mg/dL    Creatinine 1.01 0.76 - 1.27 mg/dL    Sodium 139 136 - 145 mmol/L    Potassium 4.0 3.5 - 5.2 mmol/L    Chloride 95 (L) 98 - 107 mmol/L    CO2 27.3 22.0 - 29.0 mmol/L    Calcium 9.8 8.6 - 10.5 mg/dL    Total Protein 7.3 6.0 - 8.5 g/dL    Albumin 4.50 3.50 - 5.20 g/dL    ALT (SGPT) 20 1 - 41 U/L    AST (SGOT) 25 1 - 40 U/L    Alkaline Phosphatase 71 39 - 117 U/L    Total Bilirubin 0.6 0.2 - 1.2 mg/dL    eGFR Non African Amer 76 >60 mL/min/1.73    Globulin 2.8 gm/dL    A/G Ratio 1.6 g/dL    BUN/Creatinine Ratio 17.8 7.0 - 25.0    Anion Gap 16.7 (H) 5.0 - 15.0 mmol/L   Amylase   Result Value Ref Range    Amylase 71 28 - 100 U/L   Sedimentation Rate   Result Value Ref Range    Sed Rate 30 (H) 0 - 20 mm/hr   C-reactive Protein   Result Value Ref Range    C-Reactive Protein 2.27 (H) 0.00 - 0.50 mg/dL   CBC Auto Differential   Result Value Ref Range    WBC 7.02 3.40 - 10.80 10*3/mm3    RBC 4.84 4.14 - 5.80 10*6/mm3    Hemoglobin 13.3 13.0 - 17.7 g/dL    Hematocrit 40.7 37.5 - 51.0 %    MCV 84.1 79.0 - 97.0 fL    MCH 27.5 26.6 - 33.0 pg    MCHC 32.7 31.5 - 35.7 g/dL    RDW 14.9 12.3 - 15.4 %    RDW-SD 44.7 37.0 - 54.0 fl    MPV 10.4 6.0 - 12.0 fL    Platelets 296 140 - 450 10*3/mm3    Neutrophil % 67.4 42.7 - 76.0 %     Lymphocyte % 17.2 (L) 19.6 - 45.3 %    Monocyte % 12.4 (H) 5.0 - 12.0 %    Eosinophil % 1.7 0.3 - 6.2 %    Basophil % 0.6 0.0 - 1.5 %    Immature Grans % 0.7 (H) 0.0 - 0.5 %    Neutrophils, Absolute 4.73 1.70 - 7.00 10*3/mm3    Lymphocytes, Absolute 1.21 0.70 - 3.10 10*3/mm3    Monocytes, Absolute 0.87 0.10 - 0.90 10*3/mm3    Eosinophils, Absolute 0.12 0.00 - 0.40 10*3/mm3    Basophils, Absolute 0.04 0.00 - 0.20 10*3/mm3    Immature Grans, Absolute 0.05 0.00 - 0.05 10*3/mm3    nRBC 0.0 0.0 - 0.2 /100 WBC   POC Urinalysis Dipstick, Automated   Result Value Ref Range    Color Yellow Yellow, Straw, Dark Yellow, [place]    Clarity, UA Clear Clear    Specific Gravity  1.025 1.005 - 1.030    pH, Urine 5.5 5.0 - 8.0    Leukocytes Negative Negative    Nitrite, UA Negative Negative    Protein, POC Negative Negative mg/dL    Glucose, UA Negative Negative, 1000 mg/dL (3+) mg/dL    Ketones, UA Negative Negative    Urobilinogen, UA Normal Normal    Bilirubin Negative Negative    Blood, UA Negative Negative       Assessment/Plan    Kenn was seen today for flank pain.    Diagnoses and all orders for this visit:    Left flank pain    Discussed lab results in office from prior visit. No evidence of acute abdomen on physical exam. Symptoms waxing and waning at this time. Discussed continuing with CT scan as ordered for further evaluation and management. Advised to seek urgent follow-up in ER if symptoms worsen prior to imaging being performed. Advised to avoid aggressive physical activity until results received. Patient verbalized understanding and agreement.    Plan of care reviewed with patient at conclusion of today's visit. Patient education was provided regarding diagnosis, management, and prescribed or recommended OTC medications. Patient was informed to notify office of any new, worsening, or persistent symptoms. Patient verbalized understanding and agreement with plan of care.     Follow-Up  Return if symptoms worsen or fail  to improve.    Electronically Signed By:  TIANNA Alonso

## 2020-02-05 ENCOUNTER — TRANSCRIBE ORDERS (OUTPATIENT)
Dept: LAB | Facility: HOSPITAL | Age: 60
End: 2020-02-05

## 2020-02-05 ENCOUNTER — APPOINTMENT (OUTPATIENT)
Dept: LAB | Facility: HOSPITAL | Age: 60
End: 2020-02-05

## 2020-02-05 ENCOUNTER — HOSPITAL ENCOUNTER (OUTPATIENT)
Dept: CT IMAGING | Facility: HOSPITAL | Age: 60
Discharge: HOME | End: 2020-02-05
Admitting: NURSE PRACTITIONER

## 2020-02-05 DIAGNOSIS — R10.12 LEFT UPPER QUADRANT PAIN: ICD-10-CM

## 2020-02-05 DIAGNOSIS — M06.049 RHEUMATOID ARTHRITIS INVOLVING HAND WITH NEGATIVE RHEUMATOID FACTOR, UNSPECIFIED LATERALITY (HCC): Primary | ICD-10-CM

## 2020-02-05 DIAGNOSIS — R10.11 RIGHT UPPER QUADRANT ABDOMINAL PAIN: ICD-10-CM

## 2020-02-05 DIAGNOSIS — R10.9 LEFT FLANK PAIN: ICD-10-CM

## 2020-02-05 LAB
ALBUMIN SERPL-MCNC: 4.2 G/DL (ref 3.5–5.2)
ALBUMIN/GLOB SERPL: 1.1 G/DL
ALP SERPL-CCNC: 82 U/L (ref 39–117)
ALT SERPL W P-5'-P-CCNC: 7 U/L (ref 1–41)
ANION GAP SERPL CALCULATED.3IONS-SCNC: 19.1 MMOL/L (ref 5–15)
AST SERPL-CCNC: 12 U/L (ref 1–40)
BILIRUB SERPL-MCNC: 0.4 MG/DL (ref 0.2–1.2)
BUN BLD-MCNC: 10 MG/DL (ref 6–20)
BUN/CREAT SERPL: 10.2 (ref 7–25)
CALCIUM SPEC-SCNC: 10.2 MG/DL (ref 8.6–10.5)
CHLORIDE SERPL-SCNC: 91 MMOL/L (ref 98–107)
CHROMATIN AB SERPL-ACNC: 18.4 IU/ML (ref 0–14)
CO2 SERPL-SCNC: 24.9 MMOL/L (ref 22–29)
CREAT BLD-MCNC: 0.98 MG/DL (ref 0.76–1.27)
CRP SERPL-MCNC: 16.27 MG/DL (ref 0–0.5)
DEPRECATED RDW RBC AUTO: 44.4 FL (ref 37–54)
ERYTHROCYTE [DISTWIDTH] IN BLOOD BY AUTOMATED COUNT: 14.9 % (ref 12.3–15.4)
GFR SERPL CREATININE-BSD FRML MDRD: 78 ML/MIN/1.73
GLOBULIN UR ELPH-MCNC: 3.9 GM/DL
GLUCOSE BLD-MCNC: 120 MG/DL (ref 65–99)
HCT VFR BLD AUTO: 36.8 % (ref 37.5–51)
HGB BLD-MCNC: 12.1 G/DL (ref 13–17.7)
MCH RBC QN AUTO: 26.8 PG (ref 26.6–33)
MCHC RBC AUTO-ENTMCNC: 32.9 G/DL (ref 31.5–35.7)
MCV RBC AUTO: 81.6 FL (ref 79–97)
PLATELET # BLD AUTO: 489 10*3/MM3 (ref 140–450)
PMV BLD AUTO: 10.1 FL (ref 6–12)
POTASSIUM BLD-SCNC: 4.2 MMOL/L (ref 3.5–5.2)
PROT SERPL-MCNC: 8.1 G/DL (ref 6–8.5)
RBC # BLD AUTO: 4.51 10*6/MM3 (ref 4.14–5.8)
SODIUM BLD-SCNC: 135 MMOL/L (ref 136–145)
URATE SERPL-MCNC: 8.1 MG/DL (ref 3.4–7)
WBC NRBC COR # BLD: 10.6 10*3/MM3 (ref 3.4–10.8)

## 2020-02-05 PROCEDURE — 86140 C-REACTIVE PROTEIN: CPT | Performed by: ORTHOPAEDIC SURGERY

## 2020-02-05 PROCEDURE — 74178 CT ABD&PLV WO CNTR FLWD CNTR: CPT

## 2020-02-05 PROCEDURE — 84550 ASSAY OF BLOOD/URIC ACID: CPT | Performed by: ORTHOPAEDIC SURGERY

## 2020-02-05 PROCEDURE — 85027 COMPLETE CBC AUTOMATED: CPT | Performed by: ORTHOPAEDIC SURGERY

## 2020-02-05 PROCEDURE — 86200 CCP ANTIBODY: CPT | Performed by: ORTHOPAEDIC SURGERY

## 2020-02-05 PROCEDURE — 80053 COMPREHEN METABOLIC PANEL: CPT | Performed by: ORTHOPAEDIC SURGERY

## 2020-02-05 PROCEDURE — 86038 ANTINUCLEAR ANTIBODIES: CPT | Performed by: ORTHOPAEDIC SURGERY

## 2020-02-05 PROCEDURE — 36415 COLL VENOUS BLD VENIPUNCTURE: CPT | Performed by: ORTHOPAEDIC SURGERY

## 2020-02-05 PROCEDURE — 25010000002 IOPAMIDOL 61 % SOLUTION: Performed by: NURSE PRACTITIONER

## 2020-02-05 PROCEDURE — 85652 RBC SED RATE AUTOMATED: CPT | Performed by: ORTHOPAEDIC SURGERY

## 2020-02-05 PROCEDURE — 86431 RHEUMATOID FACTOR QUANT: CPT | Performed by: ORTHOPAEDIC SURGERY

## 2020-02-05 RX ADMIN — IOPAMIDOL 100 ML: 612 INJECTION, SOLUTION INTRAVENOUS at 13:44

## 2020-02-06 DIAGNOSIS — J98.11 ATELECTASIS: ICD-10-CM

## 2020-02-06 DIAGNOSIS — R07.81 RIB PAIN ON LEFT SIDE: Primary | ICD-10-CM

## 2020-02-06 LAB — ERYTHROCYTE [SEDIMENTATION RATE] IN BLOOD: 106 MM/HR (ref 0–20)

## 2020-02-07 LAB — ANA SER QL: NEGATIVE

## 2020-02-08 LAB — CCP IGA+IGG SERPL IA-ACNC: 8 UNITS (ref 0–19)

## 2020-02-11 ENCOUNTER — TELEPHONE (OUTPATIENT)
Dept: INTERNAL MEDICINE | Facility: CLINIC | Age: 60
End: 2020-02-11

## 2020-02-11 DIAGNOSIS — M10.072 ACUTE IDIOPATHIC GOUT OF LEFT FOOT: ICD-10-CM

## 2020-02-11 DIAGNOSIS — M10.079 ACUTE IDIOPATHIC GOUT OF FOOT, UNSPECIFIED LATERALITY: Primary | ICD-10-CM

## 2020-02-11 RX ORDER — INDOMETHACIN 50 MG/1
50 CAPSULE ORAL 3 TIMES DAILY PRN
Qty: 30 CAPSULE | Refills: 0 | Status: SHIPPED | OUTPATIENT
Start: 2020-02-11 | End: 2020-03-04 | Stop reason: SDUPTHER

## 2020-02-11 RX ORDER — COLCHICINE 0.6 MG/1
TABLET ORAL
Qty: 3 TABLET | Refills: 0 | Status: SHIPPED | OUTPATIENT
Start: 2020-02-11 | End: 2020-06-10 | Stop reason: SDUPTHER

## 2020-02-11 NOTE — TELEPHONE ENCOUNTER
Medications sent to pharmacy on file.  Patient should stop taking Ibuprofen and naproxen as Indomethacin is also an anti-inflammatory.

## 2020-02-11 NOTE — TELEPHONE ENCOUNTER
Would need labs from ortho office at least in order to treat, or schedule OV for labs and treatment.

## 2020-02-11 NOTE — TELEPHONE ENCOUNTER
Pt notified that medication was sent to his pharmacy and to stop taking any other anti-inflammatory drugs like ibuprofen or naproxen. Pt verbalized understanding.

## 2020-02-11 NOTE — TELEPHONE ENCOUNTER
Pt is having issues with his feet and said he has pain ..and said he went to Orthopaedics and they did labwork and labs revealed he had gout;  He wants to know if something may be called in as he is in a boot and on crutches    Kenn: [phone]    [facility]

## 2020-02-13 NOTE — TELEPHONE ENCOUNTER
Pt called and stated that he started taking medication yesterday and states that the burning sensation is gone, but pain is still occurring when walking. Pt would like to know if there is anything else he can do to help with the pain     Pt call back  [phone]

## 2020-02-14 ENCOUNTER — TELEPHONE (OUTPATIENT)
Dept: INTERNAL MEDICINE | Facility: CLINIC | Age: 60
End: 2020-02-14

## 2020-02-14 NOTE — TELEPHONE ENCOUNTER
He should continue to take the anti-inflammatory as prescribed, can take acetaminophen intermittently for pain as well. It generally will take some time for the inflammation to subside. Follow-up for new, worsening, or persistent symptoms.

## 2020-02-14 NOTE — TELEPHONE ENCOUNTER
PT IS STILL HAVING PROBLEMS WALKING, JOINTS ARE PAINFUL, MEDICATION DOES NOT FEEL LIKE IT IS WORKING FOR THE GOUT

## 2020-02-17 ENCOUNTER — HOSPITAL ENCOUNTER (OUTPATIENT)
Dept: GENERAL RADIOLOGY | Facility: HOSPITAL | Age: 60
Discharge: HOME | End: 2020-02-17
Admitting: NURSE PRACTITIONER

## 2020-02-17 DIAGNOSIS — R07.81 RIB PAIN ON LEFT SIDE: ICD-10-CM

## 2020-02-17 DIAGNOSIS — J98.11 ATELECTASIS: ICD-10-CM

## 2020-02-17 PROCEDURE — 71046 X-RAY EXAM CHEST 2 VIEWS: CPT

## 2020-03-04 ENCOUNTER — TELEPHONE (OUTPATIENT)
Dept: INTERNAL MEDICINE | Facility: CLINIC | Age: 60
End: 2020-03-04

## 2020-03-04 ENCOUNTER — OFFICE VISIT (OUTPATIENT)
Dept: INTERNAL MEDICINE | Facility: CLINIC | Age: 60
End: 2020-03-04

## 2020-03-04 VITALS
OXYGEN SATURATION: 98 % | DIASTOLIC BLOOD PRESSURE: 90 MMHG | HEART RATE: 78 BPM | SYSTOLIC BLOOD PRESSURE: 132 MMHG | HEIGHT: 65 IN | BODY MASS INDEX: 30.82 KG/M2 | RESPIRATION RATE: 16 BRPM | WEIGHT: 185 LBS

## 2020-03-04 DIAGNOSIS — M10.079 ACUTE IDIOPATHIC GOUT OF FOOT, UNSPECIFIED LATERALITY: Primary | ICD-10-CM

## 2020-03-04 PROCEDURE — 99213 OFFICE O/P EST LOW 20 MIN: CPT | Performed by: NURSE PRACTITIONER

## 2020-03-04 RX ORDER — INDOMETHACIN 50 MG/1
50 CAPSULE ORAL 3 TIMES DAILY PRN
Qty: 30 CAPSULE | Refills: 0 | Status: SHIPPED | OUTPATIENT
Start: 2020-03-04 | End: 2020-03-31

## 2020-03-04 RX ORDER — PREDNISONE 10 MG/1
30 TABLET ORAL DAILY
Qty: 12 TABLET | Refills: 12 | Status: SHIPPED | OUTPATIENT
Start: 2020-03-04 | End: 2020-03-08

## 2020-03-04 NOTE — PATIENT INSTRUCTIONS
Gout    Gout is painful swelling of your joints. Gout is a type of arthritis. It is caused by having too much uric acid in your body. Uric acid is a chemical that is made when your body breaks down substances called purines. If your body has too much uric acid, sharp crystals can form and build up in your joints. This causes pain and swelling.  Gout attacks can happen quickly and be very painful (acute gout). Over time, the attacks can affect more joints and happen more often (chronic gout).  What are the causes?  · Too much uric acid in your blood. This can happen because:  ? Your kidneys do not remove enough uric acid from your blood.  ? Your body makes too much uric acid.  ? You eat too many foods that are high in purines. These foods include organ meats, some seafood, and beer.  · Trauma or stress.  What increases the risk?  · Having a family history of gout.  · Being male and middle-aged.  · Being female and having gone through menopause.  · Being very overweight (obese).  · Drinking alcohol, especially beer.  · Not having enough water in the body (being dehydrated).  · Losing weight too quickly.  · Having an organ transplant.  · Having lead poisoning.  · Taking certain medicines.  · Having kidney disease.  · Having a skin condition called psoriasis.  What are the signs or symptoms?  An attack of acute gout usually happens in just one joint. The most common place is the big toe. Attacks often start at night. Other joints that may be affected include joints of the feet, ankle, knee, fingers, wrist, or elbow. Symptoms of an attack may include:  · Very bad pain.  · Warmth.  · Swelling.  · Stiffness.  · Shiny, red, or purple skin.  · Tenderness. The affected joint may be very painful to touch.  · Chills and fever.  Chronic gout may cause symptoms more often. More joints may be involved. You may also have white or yellow lumps (tophi) on your hands or feet or in other areas near your joints.  How is this  treated?  · Treatment for this condition has two phases: treating an acute attack and preventing future attacks.  · Acute gout treatment may include:  ? NSAIDs.  ? Steroids. These are taken by mouth or injected into a joint.  ? Colchicine. This medicine relieves pain and swelling. It can be given by mouth or through an IV tube.  · Preventive treatment may include:  ? Taking small doses of NSAIDs or colchicine daily.  ? Using a medicine that reduces uric acid levels in your blood.  ? Making changes to your diet. You may need to see a food expert (dietitian) about what to eat and drink to prevent gout.  Follow these instructions at home:  During a gout attack    · If told, put ice on the painful area:  ? Put ice in a plastic bag.  ? Place a towel between your skin and the bag.  ? Leave the ice on for 20 minutes, 2-3 times a day.  · Raise (elevate) the painful joint above the level of your heart as often as you can.  · Rest the joint as much as possible. If the joint is in your leg, you may be given crutches.  · Follow instructions from your doctor about what you cannot eat or drink.  Avoiding future gout attacks  · Eat a low-purine diet. Avoid foods and drinks such as:  ? Liver.  ? Kidney.  ? Anchovies.  ? Asparagus.  ? Herring.  ? Mushrooms.  ? Mussels.  ? Beer.  · Stay at a healthy weight. If you want to lose weight, talk with your doctor. Do not lose weight too fast.  · Start or continue an exercise plan as told by your doctor.  Eating and drinking  · Drink enough fluids to keep your pee (urine) pale yellow.  · If you drink alcohol:  ? Limit how much you use to:  § 0-1 drink a day for women.  § 0-2 drinks a day for men.  ? Be aware of how much alcohol is in your drink. In the [place] one drink equals one 12 oz bottle of beer (355 mL), one 5 oz glass of wine (148 mL), or one 1½ oz glass of hard liquor (44 mL).  General instructions  · Take over-the-counter and prescription medicines only as told by your doctor.  · Do  not drive or use heavy machinery while taking prescription pain medicine.  · Return to your normal activities as told by your doctor. Ask your doctor what activities are safe for you.  · Keep all follow-up visits as told by your doctor. This is important.  Contact a doctor if:  · You have another gout attack.  · You still have symptoms of a gout attack after 10 days of treatment.  · You have problems (side effects) because of your medicines.  · You have chills or a fever.  · You have burning pain when you pee (urinate).  · You have pain in your lower back or belly.  Get help right away if:  · You have very bad pain.  · Your pain cannot be controlled.  · You cannot pee.  Summary  · Gout is painful swelling of the joints.  · The most common site of pain is the big toe, but it can affect other joints.  · Medicines and avoiding some foods can help to prevent and treat gout attacks.  This information is not intended to replace advice given to you by your health care provider. Make sure you discuss any questions you have with your health care provider.  Document Released: 09/26/2009 Document Revised: 07/10/2019 Document Reviewed: 07/10/2019  [employer] Interactive Patient Education © 2020 [employer]    Low-Purine Eating Plan  A low-purine eating plan involves making food choices to limit your intake of purine. Purine is a kind of uric acid. Too much uric acid in your blood can cause certain conditions, such as gout and kidney stones. Eating a low-purine diet can help control these conditions.  What are tips for following this plan?  Reading food labels    · Avoid foods with saturated or Trans fat.  · Check the ingredient list of grains-based foods, such as bread and cereal, to make sure that they contain whole grains.  · Check the ingredient list of sauces or soups to make sure they do not contain meat or fish.  · When choosing soft drinks, check the ingredient list to make sure they do not contain high-fructose corn  syrup.  Shopping  · Buy plenty of fresh fruits and vegetables.  · Avoid buying canned or fresh fish.  · Buy dairy products labeled as low-fat or nonfat.  · Avoid buying premade or processed foods. These foods are often high in fat, salt (sodium), and added sugar.  Cooking  · Use olive oil instead of butter when cooking. Oils like olive oil, canola oil, and sunflower oil contain healthy fats.  Meal planning  · Learn which foods do or do not affect you. If you find out that a food tends to cause your gout symptoms to flare up, avoid eating that food. You can enjoy foods that do not cause problems. If you have any questions about a food item, talk with your dietitian or health care provider.  · Limit foods high in fat, especially saturated fat. Fat makes it harder for your body to get rid of uric acid.  · Choose foods that are lower in fat and are lean sources of protein.  General guidelines  · Limit alcohol intake to no more than 1 drink a day for nonpregnant women and 2 drinks a day for men. One drink equals 12 oz of beer, 5 oz of wine, or 1½ oz of hard liquor. Alcohol can affect the way your body gets rid of uric acid.  · Drink plenty of water to keep your urine clear or pale yellow. Fluids can help remove uric acid from your body.  · If directed by your health care provider, take a vitamin C supplement.  · Work with your health care provider and dietitian to develop a plan to achieve or maintain a healthy weight. Losing weight can help reduce uric acid in your blood.  What foods are recommended?  The items listed may not be a complete list. Talk with your dietitian about what dietary choices are best for you.  Foods low in purines  Foods low in purines do not need to be limited. These include:  · All fruits.  · All low-purine vegetables, pickles, and olives.  · Breads, pasta, rice, cornbread, and popcorn. Cake and other baked goods.  · All dairy foods.  · Eggs, nuts, and nut butters.  · Spices and condiments, such  as salt, herbs, and vinegar.  · Plant oils, butter, and margarine.  · Water, sugar-free soft drinks, tea, coffee, and cocoa.  · Vegetable-based soups, broths, sauces, and gravies.  Foods moderate in purines  Foods moderate in purines should be limited to the amounts listed.  · ½ cup of asparagus, cauliflower, spinach, mushrooms, or green peas, each day.  · 2/3 cup uncooked oatmeal, each day.  · ¼ cup dry wheat bran or wheat germ, each day.  · 2-3 ounces of meat or poultry, each day.  · 4-6 ounces of shellfish, such as crab, lobster, oysters, or shrimp, each day.  · 1 cup cooked beans, peas, or lentils, each day.  · Soup, broths, or bouillon made from meat or fish. Limit these foods as much as possible.  What foods are not recommended?  The items listed may not be a complete list. Talk with your dietitian about what dietary choices are best for you.  Limit your intake of foods high in purines, including:  · Beer and other alcohol.  · Meat-based gravy or sauce.  · Canned or fresh fish, such as:  ? Anchovies, sardines, herring, and tuna.  ? Mussels and scallops.  ? Codfish, trout, and robert.  · Leonard.  · Organ meats, such as:  ? Liver or kidney.  ? Tripe.  ? Sweetbreads (thymus gland or pancreas).  · Wild game or goose.  · Yeast or yeast extract supplements.  · Drinks sweetened with high-fructose corn syrup.  Summary  · Eating a low-purine diet can help control conditions caused by too much uric acid in the body, such as gout or kidney stones.  · Choose low-purine foods, limit alcohol, and limit foods high in fat.  · You will learn over time which foods do or do not affect you. If you find out that a food tends to cause your gout symptoms to flare up, avoid eating that food.  This information is not intended to replace advice given to you by your health care provider. Make sure you discuss any questions you have with your health care provider.  Document Released: 04/13/2012 Document Revised: 01/31/2018 Document  Reviewed: 01/31/2018  [employer] Interactive Patient Education © 2020 [employer]

## 2020-03-04 NOTE — PROGRESS NOTES
Chief Complaint   Patient presents with   • Gout     Both feet       History of Present Illness    Kenn Hawthorne is a 59 y.o. male who presents today for persistent gout.    Gout   This is a new problem. Episode onset: 1 month ago 2/5/20. The problem occurs constantly. The problem has been gradually worsening. Associated symptoms include arthralgias and joint swelling. Pertinent negatives include no chest pain, chills, coughing, diaphoresis, fatigue, fever, headaches, myalgias, nausea or vomiting. Associated symptoms comments: Tenderness, warmth, and swelling of bilateral ankles. The symptoms are aggravated by standing and walking. Treatments tried: Has stopped drinking beer (none in 27 days), still eating deli meats - sandwiches daily, has reduced red meat intake- trying to eat more fish, trying to increase water intake.       [facility]    The following portions of the patient's history were reviewed and updated as appropriate: allergies, current medications, past family history, past medical history, past social history, past surgical history and problem list.     Social History     Tobacco Use   • Smoking status: Never Smoker   • Smokeless tobacco: Never Used   Substance Use Topics   • Alcohol use: Yes     Comment: daily beer and/or liquor       Past Medical History:   Diagnosis Date   • Eczema    • Glaucoma, left eye    • Hypertension        History reviewed. No pertinent surgical history.    Family History   Problem Relation Age of Onset   • Cancer Mother         Colon   • Stroke Father        Allergies   Allergen Reactions   • Latex Rash   • Penicillins Rash         Current Outpatient Medications:   •  colchicine 0.6 MG tablet, 2 tablets by mouth at one time followed by one tablet one hour later., Disp: 3 tablet, Rfl: 0  •  COMBIGAN 0.2-0.5 % ophthalmic solution, instill 1 drop into both eyes twice a day, Disp: , Rfl: 0  •  cyclobenzaprine (FLEXERIL) 10 MG tablet, Take 1/2 to 1 tab PO BID PRN, Disp: 21 tablet,  "Rfl: 0  •  EPINEPHrine (EPIPEN) 0.3 MG/0.3ML solution auto-injector injection, inject 1 PEN INJECTOR intramuscularly as directed, Disp: , Rfl: 0  •  hydroCHLOROthiazide (HYDRODIURIL) 12.5 MG tablet, take 1 tablet by mouth twice a day, Disp: 60 tablet, Rfl: 0  •  indomethacin (INDOCIN) 50 MG capsule, Take 1 capsule by mouth 3 (Three) Times a Day As Needed for Mild Pain ., Disp: 30 capsule, Rfl: 0  •  RESTASIS 0.05 % ophthalmic emulsion, instill 1 drop into both eyes twice a day, Disp: , Rfl: 1  •  TOBRADEX 0.3-0.1 % ophthalmic ointment, , Disp: , Rfl: 0  •  triamcinolone (KENALOG) 0.1 % ointment, , Disp: , Rfl: 0  •  predniSONE (DELTASONE) 10 MG tablet, Take 3 tablets by mouth Daily for 4 days., Disp: 12 tablet, Rfl: 12    Review of Systems  Review of Systems   Constitutional: Negative for appetite change, chills, diaphoresis, fatigue and fever.   Respiratory: Negative for cough and shortness of breath.    Cardiovascular: Negative for chest pain and palpitations.   Gastrointestinal: Negative for diarrhea, nausea and vomiting.   Musculoskeletal: Positive for arthralgias, gout and joint swelling. Negative for gait problem and myalgias.   Skin:        Warmth and tenderness of bilateral ankles   Neurological: Negative for headaches.   Psychiatric/Behavioral: Negative for sleep disturbance.       Vitals:  Vitals:    03/04/20 1702   BP: 132/90   Pulse: 78   Resp: 16   SpO2: 98%   Weight: 83.9 kg (185 lb)   Height: 165.1 cm (65\")   PainSc:   6       Physical Exam  Physical Exam   Constitutional: He is oriented to person, place, and time. Vital signs are normal. He appears well-developed and well-nourished.   Cardiovascular: Normal rate, regular rhythm and normal heart sounds.   Pulmonary/Chest: Effort normal and breath sounds normal. He has no decreased breath sounds. He has no wheezes. He has no rhonchi. He has no rales.   Musculoskeletal:        Left ankle: He exhibits swelling. He exhibits normal range of motion, no " ecchymosis, no deformity, no laceration and normal pulse. Tenderness.        Neurological: He is alert and oriented to person, place, and time.   Skin: Skin is warm and dry.   Psychiatric: He has a normal mood and affect. His behavior is normal.   Nursing note and vitals reviewed.      Labs  Results for orders placed or performed in visit on 02/05/20   Rheumatoid Factor   Result Value Ref Range    Rheumatoid Factor Quantitative 18.4 (H) 0.0 - 14.0 IU/mL   MILTON   Result Value Ref Range    MILTON Direct Negative Negative   Uric Acid   Result Value Ref Range    Uric Acid 8.1 (H) 3.4 - 7.0 mg/dL   Sedimentation Rate   Result Value Ref Range    Sed Rate 106 (H) 0 - 20 mm/hr   Cyclic Citrul Peptide Antibody, IgG / IgA   Result Value Ref Range    CCP Antibodies IgG/IgA 8 0 - 19 units   C-reactive Protein   Result Value Ref Range    C-Reactive Protein 16.27 (H) 0.00 - 0.50 mg/dL   Comprehensive Metabolic Panel   Result Value Ref Range    Glucose 120 (H) 65 - 99 mg/dL    BUN 10 6 - 20 mg/dL    Creatinine 0.98 0.76 - 1.27 mg/dL    Sodium 135 (L) 136 - 145 mmol/L    Potassium 4.2 3.5 - 5.2 mmol/L    Chloride 91 (L) 98 - 107 mmol/L    CO2 24.9 22.0 - 29.0 mmol/L    Calcium 10.2 8.6 - 10.5 mg/dL    Total Protein 8.1 6.0 - 8.5 g/dL    Albumin 4.20 3.50 - 5.20 g/dL    ALT (SGPT) 7 1 - 41 U/L    AST (SGOT) 12 1 - 40 U/L    Alkaline Phosphatase 82 39 - 117 U/L    Total Bilirubin 0.4 0.2 - 1.2 mg/dL    eGFR Non African Amer 78 >60 mL/min/1.73    Globulin 3.9 gm/dL    A/G Ratio 1.1 g/dL    BUN/Creatinine Ratio 10.2 7.0 - 25.0    Anion Gap 19.1 (H) 5.0 - 15.0 mmol/L   CBC (No Diff)   Result Value Ref Range    WBC 10.60 3.40 - 10.80 10*3/mm3    RBC 4.51 4.14 - 5.80 10*6/mm3    Hemoglobin 12.1 (L) 13.0 - 17.7 g/dL    Hematocrit 36.8 (L) 37.5 - 51.0 %    MCV 81.6 79.0 - 97.0 fL    MCH 26.8 26.6 - 33.0 pg    MCHC 32.9 31.5 - 35.7 g/dL    RDW 14.9 12.3 - 15.4 %    RDW-SD 44.4 37.0 - 54.0 fl    MPV 10.1 6.0 - 12.0 fL    Platelets 489 (H) 140 -  450 10*3/mm3       Assessment/Plan    Kenn was seen today for gout.    Diagnoses and all orders for this visit:    Acute idiopathic gout of foot, unspecified laterality  -     predniSONE (DELTASONE) 10 MG tablet; Take 3 tablets by mouth Daily for 4 days.  -     indomethacin (INDOCIN) 50 MG capsule; Take 1 capsule by mouth 3 (Three) Times a Day As Needed for Mild Pain .    Advised patient in dietary modification to decrease purine intake. Will trial prednisone given persistent symptoms despite use of previously prescribed colchicine and indomethacin. Advised elevation of extremities at rest, increased water intake, and intermittent application of cold packs for pain and swelling.     Plan of care reviewed with patient at conclusion of today's visit. Patient education was provided regarding diagnosis, management, and prescribed or recommended OTC medications. Patient was informed to notify office of any new, worsening, or persistent symptoms. Patient verbalized understanding and agreement with plan of care.     Follow-Up  Return if symptoms worsen or fail to improve.      Electronically Signed By:  TIANNA Alonso Dragon/Transcription Disclaimer:  Much of this encounter note is an electronic transcription/translation of spoken language to printed text.  The electronic translation of spoken language may permit erroneous, or at times, nonsensical words or phrases to be inadvertently transcribed.  Although I have reviewed the note for such errors, some may still exist.

## 2020-03-04 NOTE — TELEPHONE ENCOUNTER
PATIENT CALLED AND STATED THAT HIS GOUT IS STARTED TO FLARE UP. HE WOULD LIKE TO SEE IF HE CAN GET SOME MEDICATION CALLED IN TO HIS PHARMACY FOR THE PAIN. PT NEEDS TO KNOW IF HE NEEDS TO COME IN FIRST OR IF HE CAN GET THE MEDICATION CALLED IN.     SENT TO [facility] IN [facility].     PLEASE ADVISE PT -[phone].

## 2020-03-18 DIAGNOSIS — I10 HYPERTENSION, UNSPECIFIED TYPE: ICD-10-CM

## 2020-03-19 RX ORDER — HYDROCHLOROTHIAZIDE 12.5 MG/1
TABLET ORAL
Qty: 60 TABLET | Refills: 0 | Status: SHIPPED | OUTPATIENT
Start: 2020-03-19 | End: 2020-04-24

## 2020-03-31 DIAGNOSIS — M10.079 ACUTE IDIOPATHIC GOUT OF FOOT, UNSPECIFIED LATERALITY: ICD-10-CM

## 2020-03-31 RX ORDER — INDOMETHACIN 50 MG/1
CAPSULE ORAL
Qty: 30 CAPSULE | Refills: 0 | Status: SHIPPED | OUTPATIENT
Start: 2020-03-31 | End: 2020-04-30

## 2020-04-23 DIAGNOSIS — I10 HYPERTENSION, UNSPECIFIED TYPE: ICD-10-CM

## 2020-04-24 RX ORDER — HYDROCHLOROTHIAZIDE 12.5 MG/1
TABLET ORAL
Qty: 60 TABLET | Refills: 0 | Status: SHIPPED | OUTPATIENT
Start: 2020-04-24 | End: 2020-05-26

## 2020-04-29 DIAGNOSIS — M10.079 ACUTE IDIOPATHIC GOUT OF FOOT, UNSPECIFIED LATERALITY: ICD-10-CM

## 2020-04-30 RX ORDER — INDOMETHACIN 50 MG/1
CAPSULE ORAL
Qty: 30 CAPSULE | Refills: 0 | Status: SHIPPED | OUTPATIENT
Start: 2020-04-30 | End: 2020-06-10 | Stop reason: SDUPTHER

## 2020-05-24 DIAGNOSIS — I10 HYPERTENSION, UNSPECIFIED TYPE: ICD-10-CM

## 2020-05-26 RX ORDER — HYDROCHLOROTHIAZIDE 12.5 MG/1
TABLET ORAL
Qty: 60 TABLET | Refills: 2 | Status: SHIPPED | OUTPATIENT
Start: 2020-05-26 | End: 2020-06-10 | Stop reason: ALTCHOICE

## 2020-06-04 DIAGNOSIS — M10.079 ACUTE IDIOPATHIC GOUT OF FOOT, UNSPECIFIED LATERALITY: ICD-10-CM

## 2020-06-05 ENCOUNTER — TELEPHONE (OUTPATIENT)
Dept: INTERNAL MEDICINE | Facility: CLINIC | Age: 60
End: 2020-06-05

## 2020-06-05 RX ORDER — INDOMETHACIN 50 MG/1
CAPSULE ORAL
Qty: 30 CAPSULE | Refills: 0 | OUTPATIENT
Start: 2020-06-05

## 2020-06-05 NOTE — TELEPHONE ENCOUNTER
PATIENT IS CALLING TO CHECK ON THIS REFILL   HE STATES HE IS COMPLETELY OUT OF THE MEDICATION     indomethacin (INDOCIN) 50 MG capsule

## 2020-06-05 NOTE — TELEPHONE ENCOUNTER
Medication is used for acute gout, however if patient is having recurring episodes of gout frequently he should be advised to make follow-up appointment to discuss acute as well as long term management and prevention.

## 2020-06-08 ENCOUNTER — TELEPHONE (OUTPATIENT)
Dept: INTERNAL MEDICINE | Facility: CLINIC | Age: 60
End: 2020-06-08

## 2020-06-09 NOTE — TELEPHONE ENCOUNTER
Please have patient make appointment for follow-up regarding gout flares and medication management.

## 2020-06-10 ENCOUNTER — LAB (OUTPATIENT)
Dept: LAB | Facility: HOSPITAL | Age: 60
End: 2020-06-10

## 2020-06-10 ENCOUNTER — OFFICE VISIT (OUTPATIENT)
Dept: INTERNAL MEDICINE | Facility: CLINIC | Age: 60
End: 2020-06-10

## 2020-06-10 VITALS — SYSTOLIC BLOOD PRESSURE: 118 MMHG | DIASTOLIC BLOOD PRESSURE: 82 MMHG | HEART RATE: 92 BPM | OXYGEN SATURATION: 98 %

## 2020-06-10 DIAGNOSIS — M1A.09X0 IDIOPATHIC CHRONIC GOUT OF MULTIPLE SITES WITHOUT TOPHUS: Primary | ICD-10-CM

## 2020-06-10 DIAGNOSIS — M25.521 ARTHRALGIA OF BOTH ELBOWS: ICD-10-CM

## 2020-06-10 DIAGNOSIS — R73.9 HYPERGLYCEMIA: Primary | ICD-10-CM

## 2020-06-10 DIAGNOSIS — M25.522 ARTHRALGIA OF BOTH ELBOWS: ICD-10-CM

## 2020-06-10 DIAGNOSIS — R11.0 NAUSEA: ICD-10-CM

## 2020-06-10 DIAGNOSIS — M1A.09X0 IDIOPATHIC CHRONIC GOUT OF MULTIPLE SITES WITHOUT TOPHUS: ICD-10-CM

## 2020-06-10 DIAGNOSIS — G56.02 CARPAL TUNNEL SYNDROME OF LEFT WRIST: ICD-10-CM

## 2020-06-10 LAB
ALBUMIN SERPL-MCNC: 4.1 G/DL (ref 3.5–5.2)
ALBUMIN/GLOB SERPL: 1.3 G/DL
ALP SERPL-CCNC: 60 U/L (ref 39–117)
ALT SERPL W P-5'-P-CCNC: 13 U/L (ref 1–41)
ANION GAP SERPL CALCULATED.3IONS-SCNC: 14.5 MMOL/L (ref 5–15)
AST SERPL-CCNC: 11 U/L (ref 1–40)
BILIRUB SERPL-MCNC: 0.3 MG/DL (ref 0.2–1.2)
BUN BLD-MCNC: 16 MG/DL (ref 8–23)
BUN/CREAT SERPL: 14.4 (ref 7–25)
CALCIUM SPEC-SCNC: 9.6 MG/DL (ref 8.6–10.5)
CHLORIDE SERPL-SCNC: 92 MMOL/L (ref 98–107)
CHROMATIN AB SERPL-ACNC: 12.8 IU/ML (ref 0–14)
CO2 SERPL-SCNC: 26.5 MMOL/L (ref 22–29)
CREAT BLD-MCNC: 1.11 MG/DL (ref 0.76–1.27)
CRP SERPL-MCNC: 5.9 MG/DL (ref 0–0.5)
ERYTHROCYTE [SEDIMENTATION RATE] IN BLOOD: 40 MM/HR (ref 0–20)
GFR SERPL CREATININE-BSD FRML MDRD: 68 ML/MIN/1.73
GLOBULIN UR ELPH-MCNC: 3.2 GM/DL
GLUCOSE BLD-MCNC: 157 MG/DL (ref 65–99)
POTASSIUM BLD-SCNC: 3.2 MMOL/L (ref 3.5–5.2)
PROT SERPL-MCNC: 7.3 G/DL (ref 6–8.5)
SODIUM BLD-SCNC: 133 MMOL/L (ref 136–145)
URATE SERPL-MCNC: 6.7 MG/DL (ref 3.4–7)

## 2020-06-10 PROCEDURE — 84550 ASSAY OF BLOOD/URIC ACID: CPT | Performed by: NURSE PRACTITIONER

## 2020-06-10 PROCEDURE — 86431 RHEUMATOID FACTOR QUANT: CPT | Performed by: NURSE PRACTITIONER

## 2020-06-10 PROCEDURE — 80053 COMPREHEN METABOLIC PANEL: CPT | Performed by: NURSE PRACTITIONER

## 2020-06-10 PROCEDURE — 86038 ANTINUCLEAR ANTIBODIES: CPT | Performed by: NURSE PRACTITIONER

## 2020-06-10 PROCEDURE — 85652 RBC SED RATE AUTOMATED: CPT | Performed by: NURSE PRACTITIONER

## 2020-06-10 PROCEDURE — 86140 C-REACTIVE PROTEIN: CPT | Performed by: NURSE PRACTITIONER

## 2020-06-10 PROCEDURE — 96372 THER/PROPH/DIAG INJ SC/IM: CPT | Performed by: NURSE PRACTITIONER

## 2020-06-10 PROCEDURE — 99213 OFFICE O/P EST LOW 20 MIN: CPT | Performed by: NURSE PRACTITIONER

## 2020-06-10 RX ORDER — DUPILUMAB 300 MG/2ML
INJECTION, SOLUTION SUBCUTANEOUS
COMMUNITY
Start: 2020-05-21 | End: 2021-11-24

## 2020-06-10 RX ORDER — DEXAMETHASONE SODIUM PHOSPHATE 4 MG/ML
4 INJECTION, SOLUTION INTRA-ARTICULAR; INTRALESIONAL; INTRAMUSCULAR; INTRAVENOUS; SOFT TISSUE ONCE
Status: COMPLETED | OUTPATIENT
Start: 2020-06-10 | End: 2020-06-10

## 2020-06-10 RX ORDER — DEXAMETHASONE SODIUM PHOSPHATE 4 MG/ML
4 INJECTION, SOLUTION INTRA-ARTICULAR; INTRALESIONAL; INTRAMUSCULAR; INTRAVENOUS; SOFT TISSUE ONCE
Status: DISCONTINUED | OUTPATIENT
Start: 2020-06-10 | End: 2020-06-10

## 2020-06-10 RX ORDER — ALLOPURINOL 300 MG/1
300 TABLET ORAL DAILY
Qty: 30 TABLET | Refills: 5 | Status: SHIPPED | OUTPATIENT
Start: 2020-06-10 | End: 2020-07-24

## 2020-06-10 RX ORDER — INDOMETHACIN 50 MG/1
50 CAPSULE ORAL 3 TIMES DAILY PRN
Qty: 60 CAPSULE | Refills: 1 | Status: SHIPPED | OUTPATIENT
Start: 2020-06-10 | End: 2020-09-15

## 2020-06-10 RX ORDER — KETOROLAC TROMETHAMINE 30 MG/ML
60 INJECTION, SOLUTION INTRAMUSCULAR; INTRAVENOUS ONCE
Status: COMPLETED | OUTPATIENT
Start: 2020-06-10 | End: 2020-06-10

## 2020-06-10 RX ORDER — ONDANSETRON 4 MG/1
4 TABLET, ORALLY DISINTEGRATING ORAL EVERY 6 HOURS PRN
Status: DISCONTINUED | OUTPATIENT
Start: 2020-06-10 | End: 2020-06-18

## 2020-06-10 RX ORDER — LISINOPRIL 20 MG/1
20 TABLET ORAL DAILY
Qty: 30 TABLET | Refills: 5 | Status: SHIPPED | OUTPATIENT
Start: 2020-06-10 | End: 2020-10-28 | Stop reason: SDUPTHER

## 2020-06-10 RX ORDER — COLCHICINE 0.6 MG/1
TABLET ORAL
Qty: 3 TABLET | Refills: 0 | Status: SHIPPED | OUTPATIENT
Start: 2020-06-10 | End: 2020-06-18

## 2020-06-10 RX ORDER — FEXOFENADINE HYDROCHLORIDE AND PSEUDOEPHEDRINE HYDROCHLORIDE 60; 120 MG/1; MG/1
1 TABLET, FILM COATED, EXTENDED RELEASE ORAL EVERY MORNING
COMMUNITY
Start: 2020-06-02 | End: 2021-09-10

## 2020-06-10 RX ADMIN — DEXAMETHASONE SODIUM PHOSPHATE 4 MG: 4 INJECTION, SOLUTION INTRA-ARTICULAR; INTRALESIONAL; INTRAMUSCULAR; INTRAVENOUS; SOFT TISSUE at 08:56

## 2020-06-10 RX ADMIN — ONDANSETRON 4 MG: 4 TABLET, ORALLY DISINTEGRATING ORAL at 08:58

## 2020-06-10 RX ADMIN — KETOROLAC TROMETHAMINE 60 MG: 30 INJECTION, SOLUTION INTRAMUSCULAR; INTRAVENOUS at 08:57

## 2020-06-10 NOTE — PROGRESS NOTES
Chief Complaint   Patient presents with   • Follow-up     Gout       History of Present Illness    Kenn Hawthorne is a 60 y.o. male who presents today for acute gout flare up. Patient first noticed flare up last Thursday. Pain is located in bilateral elbows, left knee, right ankle, left hand. Pain is the same throughout the day. Patient takes indomethacin with little relief. Patient took colochicine with good relief. Pain is worse when doing any sort of physical activity. Patient cannot walk and had difficulty driving to the clinic today. No recent ill contacts or hospitalizations. Patient has since quit drinking alcohol on may 12 and has been following a low-purine diet. Taking Dupixent for chronic eczema. Chronic dry eye followed by ophthalmology.     [facility]    The following portions of the patient's history were reviewed and updated as appropriate: allergies, current medications, past family history, past medical history, past social history, past surgical history and problem list.     Social History     Tobacco Use   • Smoking status: Never Smoker   • Smokeless tobacco: Never Used   Substance Use Topics   • Alcohol use: Yes     Comment: daily beer and/or liquor       Past Medical History:   Diagnosis Date   • Eczema    • Glaucoma, left eye    • Hypertension        History reviewed. No pertinent surgical history.    Family History   Problem Relation Age of Onset   • Cancer Mother         Colon   • Stroke Father        Allergies   Allergen Reactions   • Latex Rash   • Penicillins Rash         Current Outpatient Medications:   •  ALLEGRA-D ALLERGY & CONGESTION  MG per 12 hr tablet, Take 1 tablet by mouth Every Morning., Disp: , Rfl:   •  colchicine 0.6 MG tablet, 2 tablets by mouth at one time followed by one tablet one hour later., Disp: 3 tablet, Rfl: 0  •  COMBIGAN 0.2-0.5 % ophthalmic solution, instill 1 drop into both eyes twice a day, Disp: , Rfl: 0  •  cyclobenzaprine (FLEXERIL) 10 MG tablet, Take 1/2  to 1 tab PO BID PRN, Disp: 21 tablet, Rfl: 0  •  DUPIXENT 300 MG/2ML solution prefilled syringe, , Disp: , Rfl:   •  EPINEPHrine (EPIPEN) 0.3 MG/0.3ML solution auto-injector injection, inject 1 PEN INJECTOR intramuscularly as directed, Disp: , Rfl: 0  •  indomethacin (INDOCIN) 50 MG capsule, Take 1 capsule by mouth 3 (Three) Times a Day As Needed for Mild Pain ., Disp: 60 capsule, Rfl: 1  •  RESTASIS 0.05 % ophthalmic emulsion, instill 1 drop into both eyes twice a day, Disp: , Rfl: 1  •  TOBRADEX 0.3-0.1 % ophthalmic ointment, , Disp: , Rfl: 0  •  triamcinolone (KENALOG) 0.1 % ointment, , Disp: , Rfl: 0  •  allopurinol (ZYLOPRIM) 300 MG tablet, Take 1 tablet by mouth Daily., Disp: 30 tablet, Rfl: 5  •  lisinopril (PRINIVIL,ZESTRIL) 20 MG tablet, Take 1 tablet by mouth Daily., Disp: 30 tablet, Rfl: 5    Current Facility-Administered Medications:   •  ondansetron ODT (ZOFRAN-ODT) disintegrating tablet 4 mg, 4 mg, Oral, Q6H PRN, Haley Turner APRN, 4 mg at 06/10/20 0858    Review of Systems  Review of Systems   Constitutional: Negative for appetite change, chills, diaphoresis, fatigue and fever.   Respiratory: Negative for cough, chest tightness, shortness of breath and wheezing.    Cardiovascular: Negative for chest pain, palpitations and leg swelling.   Gastrointestinal: Positive for nausea. Negative for abdominal pain, constipation, diarrhea and vomiting.   Musculoskeletal: Positive for arthralgias, gait problem and joint swelling. Negative for myalgias, neck pain and neck stiffness.   Psychiatric/Behavioral: Negative for sleep disturbance.       Vitals:  Vitals:    06/10/20 0817   BP: 118/82   Pulse: 92   SpO2: 98%   Weight: Comment: Unable to weigh   PainSc: 10-Worst pain ever       Physical Exam  Physical Exam   Constitutional: He is oriented to person, place, and time. Vital signs are normal. He appears well-developed and well-nourished.  Non-toxic appearance. No distress.   Cardiovascular: Normal rate,  regular rhythm and normal heart sounds.   Pulmonary/Chest: Effort normal and breath sounds normal. He has no decreased breath sounds. He has no wheezes. He has no rhonchi. He has no rales.   Musculoskeletal:        Left wrist: He exhibits decreased range of motion, tenderness and swelling. He exhibits no bony tenderness, no effusion, no crepitus, no deformity and no laceration.        Left knee: He exhibits decreased range of motion, swelling and effusion. He exhibits no ecchymosis, no deformity, no laceration, no erythema, normal alignment and normal patellar mobility. Tenderness found.        Right ankle: He exhibits decreased range of motion and swelling. He exhibits no ecchymosis, no deformity, no laceration and normal pulse. Tenderness.        Right forearm: He exhibits tenderness and swelling. He exhibits no bony tenderness, no edema, no deformity and no laceration.        Left forearm: He exhibits tenderness and swelling. He exhibits no bony tenderness, no edema, no deformity and no laceration.   (+) tinel's sign, (+) Phalen's sign indicating carpel tunnel    Neurological: He is alert and oriented to person, place, and time.   Skin: Skin is warm, dry and intact.   Psychiatric: He has a normal mood and affect. His behavior is normal.   Nursing note and vitals reviewed.      Labs  Results for orders placed or performed in visit on 02/05/20   Rheumatoid Factor   Result Value Ref Range    Rheumatoid Factor Quantitative 18.4 (H) 0.0 - 14.0 IU/mL   MILTON   Result Value Ref Range    MILTON Direct Negative Negative   Uric Acid   Result Value Ref Range    Uric Acid 8.1 (H) 3.4 - 7.0 mg/dL   Sedimentation Rate   Result Value Ref Range    Sed Rate 106 (H) 0 - 20 mm/hr   Cyclic Citrul Peptide Antibody, IgG / IgA   Result Value Ref Range    CCP Antibodies IgG/IgA 8 0 - 19 units   C-reactive Protein   Result Value Ref Range    C-Reactive Protein 16.27 (H) 0.00 - 0.50 mg/dL   Comprehensive Metabolic Panel   Result Value Ref  Range    Glucose 120 (H) 65 - 99 mg/dL    BUN 10 6 - 20 mg/dL    Creatinine 0.98 0.76 - 1.27 mg/dL    Sodium 135 (L) 136 - 145 mmol/L    Potassium 4.2 3.5 - 5.2 mmol/L    Chloride 91 (L) 98 - 107 mmol/L    CO2 24.9 22.0 - 29.0 mmol/L    Calcium 10.2 8.6 - 10.5 mg/dL    Total Protein 8.1 6.0 - 8.5 g/dL    Albumin 4.20 3.50 - 5.20 g/dL    ALT (SGPT) 7 1 - 41 U/L    AST (SGOT) 12 1 - 40 U/L    Alkaline Phosphatase 82 39 - 117 U/L    Total Bilirubin 0.4 0.2 - 1.2 mg/dL    eGFR Non African Amer 78 >60 mL/min/1.73    Globulin 3.9 gm/dL    A/G Ratio 1.1 g/dL    BUN/Creatinine Ratio 10.2 7.0 - 25.0    Anion Gap 19.1 (H) 5.0 - 15.0 mmol/L   CBC (No Diff)   Result Value Ref Range    WBC 10.60 3.40 - 10.80 10*3/mm3    RBC 4.51 4.14 - 5.80 10*6/mm3    Hemoglobin 12.1 (L) 13.0 - 17.7 g/dL    Hematocrit 36.8 (L) 37.5 - 51.0 %    MCV 81.6 79.0 - 97.0 fL    MCH 26.8 26.6 - 33.0 pg    MCHC 32.9 31.5 - 35.7 g/dL    RDW 14.9 12.3 - 15.4 %    RDW-SD 44.4 37.0 - 54.0 fl    MPV 10.1 6.0 - 12.0 fL    Platelets 489 (H) 140 - 450 10*3/mm3       Assessment/Plan    Kenn was seen today for follow-up.    Diagnoses and all orders for this visit:    Idiopathic chronic gout of multiple sites without tophus  -     Uric acid; Future  -     Comprehensive Metabolic Panel; Future  -     ketorolac (TORADOL) injection 60 mg  -     Sedimentation Rate; Future  -     C-reactive protein; Future  -     MILTON; Future  -     Rheumatoid Factor; Future  -     allopurinol (ZYLOPRIM) 300 MG tablet; Take 1 tablet by mouth Daily.  -     colchicine 0.6 MG tablet; 2 tablets by mouth at one time followed by one tablet one hour later.  -     indomethacin (INDOCIN) 50 MG capsule; Take 1 capsule by mouth 3 (Three) Times a Day As Needed for Mild Pain .  -     lisinopril (PRINIVIL,ZESTRIL) 20 MG tablet; Take 1 tablet by mouth Daily.  -     Discontinue: dexamethasone (DECADRON) injection 4 mg  -     dexamethasone (DECADRON) injection 4 mg    Nausea  -     ondansetron ODT  (ZOFRAN-ODT) disintegrating tablet 4 mg    Arthralgia of both elbows  -     Ambulatory Referral to Rheumatology    Carpal tunnel syndrome of left wrist    Toradol and Dexamethasone in office today given significant and multi-joint involvement. Ondansetron ODT administered given nausea in office. Colchicine and indomethacin for acute flare, patient to follow-up in 48-72 hours is no improvement, will prescribe oral glucocorticoids as needed. Referral to rheumatology given previously elevated inflammatory markers, redraw today for repeat evaluation. Recommend cock up wrist splint for left wrist carpal tunnel.     Plan of care reviewed with patient at conclusion of today's visit. Patient education was provided regarding diagnosis, management, and prescribed or recommended OTC medications. Patient was informed to notify office of any new, worsening, or persistent symptoms. Patient verbalized understanding and agreement with plan of care.     Follow-Up  Return in about 6 weeks (around 7/22/2020) for F/U with PCP.      Electronically Signed By:  TIANNA Alonso Dragon/Transcription Disclaimer:  Please note that portions of this encounter note were completed using electronic transcription/translation of spoken language to printed text.  The electronic transcription/translation of spoken language may permit erroneous, or at times, nonsensical words or phrases to be inadvertently transcribed.  Although I have reviewed the note for such errors, some may still exist in this documentation.

## 2020-06-10 NOTE — PATIENT INSTRUCTIONS
Gout    Gout is painful swelling of your joints. Gout is a type of arthritis. It is caused by having too much uric acid in your body. Uric acid is a chemical that is made when your body breaks down substances called purines. If your body has too much uric acid, sharp crystals can form and build up in your joints. This causes pain and swelling.  Gout attacks can happen quickly and be very painful (acute gout). Over time, the attacks can affect more joints and happen more often (chronic gout).  What are the causes?  · Too much uric acid in your blood. This can happen because:  ? Your kidneys do not remove enough uric acid from your blood.  ? Your body makes too much uric acid.  ? You eat too many foods that are high in purines. These foods include organ meats, some seafood, and beer.  · Trauma or stress.  What increases the risk?  · Having a family history of gout.  · Being male and middle-aged.  · Being female and having gone through menopause.  · Being very overweight (obese).  · Drinking alcohol, especially beer.  · Not having enough water in the body (being dehydrated).  · Losing weight too quickly.  · Having an organ transplant.  · Having lead poisoning.  · Taking certain medicines.  · Having kidney disease.  · Having a skin condition called psoriasis.  What are the signs or symptoms?  An attack of acute gout usually happens in just one joint. The most common place is the big toe. Attacks often start at night. Other joints that may be affected include joints of the feet, ankle, knee, fingers, wrist, or elbow. Symptoms of an attack may include:  · Very bad pain.  · Warmth.  · Swelling.  · Stiffness.  · Shiny, red, or purple skin.  · Tenderness. The affected joint may be very painful to touch.  · Chills and fever.  Chronic gout may cause symptoms more often. More joints may be involved. You may also have white or yellow lumps (tophi) on your hands or feet or in other areas near your joints.  How is this  treated?  · Treatment for this condition has two phases: treating an acute attack and preventing future attacks.  · Acute gout treatment may include:  ? NSAIDs.  ? Steroids. These are taken by mouth or injected into a joint.  ? Colchicine. This medicine relieves pain and swelling. It can be given by mouth or through an IV tube.  · Preventive treatment may include:  ? Taking small doses of NSAIDs or colchicine daily.  ? Using a medicine that reduces uric acid levels in your blood.  ? Making changes to your diet. You may need to see a food expert (dietitian) about what to eat and drink to prevent gout.  Follow these instructions at home:  During a gout attack    · If told, put ice on the painful area:  ? Put ice in a plastic bag.  ? Place a towel between your skin and the bag.  ? Leave the ice on for 20 minutes, 2-3 times a day.  · Raise (elevate) the painful joint above the level of your heart as often as you can.  · Rest the joint as much as possible. If the joint is in your leg, you may be given crutches.  · Follow instructions from your doctor about what you cannot eat or drink.  Avoiding future gout attacks  · Eat a low-purine diet. Avoid foods and drinks such as:  ? Liver.  ? Kidney.  ? Anchovies.  ? Asparagus.  ? Herring.  ? Mushrooms.  ? Mussels.  ? Beer.  · Stay at a healthy weight. If you want to lose weight, talk with your doctor. Do not lose weight too fast.  · Start or continue an exercise plan as told by your doctor.  Eating and drinking  · Drink enough fluids to keep your pee (urine) pale yellow.  · If you drink alcohol:  ? Limit how much you use to:  § 0-1 drink a day for women.  § 0-2 drinks a day for men.  ? Be aware of how much alcohol is in your drink. In the [place] one drink equals one 12 oz bottle of beer (355 mL), one 5 oz glass of wine (148 mL), or one 1½ oz glass of hard liquor (44 mL).  General instructions  · Take over-the-counter and prescription medicines only as told by your doctor.  · Do  not drive or use heavy machinery while taking prescription pain medicine.  · Return to your normal activities as told by your doctor. Ask your doctor what activities are safe for you.  · Keep all follow-up visits as told by your doctor. This is important.  Contact a doctor if:  · You have another gout attack.  · You still have symptoms of a gout attack after 10 days of treatment.  · You have problems (side effects) because of your medicines.  · You have chills or a fever.  · You have burning pain when you pee (urinate).  · You have pain in your lower back or belly.  Get help right away if:  · You have very bad pain.  · Your pain cannot be controlled.  · You cannot pee.  Summary  · Gout is painful swelling of the joints.  · The most common site of pain is the big toe, but it can affect other joints.  · Medicines and avoiding some foods can help to prevent and treat gout attacks.  This information is not intended to replace advice given to you by your health care provider. Make sure you discuss any questions you have with your health care provider.  Document Released: 09/26/2009 Document Revised: 07/10/2019 Document Reviewed: 07/10/2019  [employer] Patient Education © 2020 [employer]    Low-Purine Eating Plan  A low-purine eating plan involves making food choices to limit your intake of purine. Purine is a kind of uric acid. Too much uric acid in your blood can cause certain conditions, such as gout and kidney stones. Eating a low-purine diet can help control these conditions.  What are tips for following this plan?  Reading food labels    · Avoid foods with saturated or Trans fat.  · Check the ingredient list of grains-based foods, such as bread and cereal, to make sure that they contain whole grains.  · Check the ingredient list of sauces or soups to make sure they do not contain meat or fish.  · When choosing soft drinks, check the ingredient list to make sure they do not contain high-fructose corn  syrup.  Shopping  · Buy plenty of fresh fruits and vegetables.  · Avoid buying canned or fresh fish.  · Buy dairy products labeled as low-fat or nonfat.  · Avoid buying premade or processed foods. These foods are often high in fat, salt (sodium), and added sugar.  Cooking  · Use olive oil instead of butter when cooking. Oils like olive oil, canola oil, and sunflower oil contain healthy fats.  Meal planning  · Learn which foods do or do not affect you. If you find out that a food tends to cause your gout symptoms to flare up, avoid eating that food. You can enjoy foods that do not cause problems. If you have any questions about a food item, talk with your dietitian or health care provider.  · Limit foods high in fat, especially saturated fat. Fat makes it harder for your body to get rid of uric acid.  · Choose foods that are lower in fat and are lean sources of protein.  General guidelines  · Limit alcohol intake to no more than 1 drink a day for nonpregnant women and 2 drinks a day for men. One drink equals 12 oz of beer, 5 oz of wine, or 1½ oz of hard liquor. Alcohol can affect the way your body gets rid of uric acid.  · Drink plenty of water to keep your urine clear or pale yellow. Fluids can help remove uric acid from your body.  · If directed by your health care provider, take a vitamin C supplement.  · Work with your health care provider and dietitian to develop a plan to achieve or maintain a healthy weight. Losing weight can help reduce uric acid in your blood.  What foods are recommended?  The items listed may not be a complete list. Talk with your dietitian about what dietary choices are best for you.  Foods low in purines  Foods low in purines do not need to be limited. These include:  · All fruits.  · All low-purine vegetables, pickles, and olives.  · Breads, pasta, rice, cornbread, and popcorn. Cake and other baked goods.  · All dairy foods.  · Eggs, nuts, and nut butters.  · Spices and condiments, such  as salt, herbs, and vinegar.  · Plant oils, butter, and margarine.  · Water, sugar-free soft drinks, tea, coffee, and cocoa.  · Vegetable-based soups, broths, sauces, and gravies.  Foods moderate in purines  Foods moderate in purines should be limited to the amounts listed.  · ½ cup of asparagus, cauliflower, spinach, mushrooms, or green peas, each day.  · 2/3 cup uncooked oatmeal, each day.  · ¼ cup dry wheat bran or wheat germ, each day.  · 2-3 ounces of meat or poultry, each day.  · 4-6 ounces of shellfish, such as crab, lobster, oysters, or shrimp, each day.  · 1 cup cooked beans, peas, or lentils, each day.  · Soup, broths, or bouillon made from meat or fish. Limit these foods as much as possible.  What foods are not recommended?  The items listed may not be a complete list. Talk with your dietitian about what dietary choices are best for you.  Limit your intake of foods high in purines, including:  · Beer and other alcohol.  · Meat-based gravy or sauce.  · Canned or fresh fish, such as:  ? Anchovies, sardines, herring, and tuna.  ? Mussels and scallops.  ? Codfish, trout, and robert.  · Leonard.  · Organ meats, such as:  ? Liver or kidney.  ? Tripe.  ? Sweetbreads (thymus gland or pancreas).  · Wild game or goose.  · Yeast or yeast extract supplements.  · Drinks sweetened with high-fructose corn syrup.  Summary  · Eating a low-purine diet can help control conditions caused by too much uric acid in the body, such as gout or kidney stones.  · Choose low-purine foods, limit alcohol, and limit foods high in fat.  · You will learn over time which foods do or do not affect you. If you find out that a food tends to cause your gout symptoms to flare up, avoid eating that food.  This information is not intended to replace advice given to you by your health care provider. Make sure you discuss any questions you have with your health care provider.  Document Released: 04/13/2012 Document Revised: 11/30/2018 Document  Reviewed: 01/31/2018  [employer] Patient Education © 2020 [employer]

## 2020-06-11 LAB — ANA SER QL: NEGATIVE

## 2020-06-18 ENCOUNTER — OFFICE VISIT (OUTPATIENT)
Dept: INTERNAL MEDICINE | Facility: CLINIC | Age: 60
End: 2020-06-18

## 2020-06-18 ENCOUNTER — LAB (OUTPATIENT)
Dept: LAB | Facility: HOSPITAL | Age: 60
End: 2020-06-18

## 2020-06-18 VITALS
BODY MASS INDEX: 31.16 KG/M2 | WEIGHT: 187 LBS | SYSTOLIC BLOOD PRESSURE: 118 MMHG | HEIGHT: 65 IN | TEMPERATURE: 97.1 F | DIASTOLIC BLOOD PRESSURE: 80 MMHG | HEART RATE: 74 BPM | OXYGEN SATURATION: 99 %

## 2020-06-18 DIAGNOSIS — M25.542 PAIN IN JOINTS OF LEFT HAND: Primary | ICD-10-CM

## 2020-06-18 DIAGNOSIS — R73.9 HYPERGLYCEMIA: ICD-10-CM

## 2020-06-18 LAB — HBA1C MFR BLD: 6.5 % (ref 4.8–5.6)

## 2020-06-18 PROCEDURE — 99213 OFFICE O/P EST LOW 20 MIN: CPT | Performed by: NURSE PRACTITIONER

## 2020-06-18 PROCEDURE — 83036 HEMOGLOBIN GLYCOSYLATED A1C: CPT | Performed by: NURSE PRACTITIONER

## 2020-06-18 RX ORDER — METHYLPREDNISOLONE 4 MG/1
TABLET ORAL
Qty: 21 EACH | Refills: 0 | Status: SHIPPED | OUTPATIENT
Start: 2020-06-18 | End: 2020-07-24

## 2020-06-18 NOTE — PROGRESS NOTES
Chief Complaint   Patient presents with   • Hand Injury       History of Present Illness    Kenn Hawthorne is a 60 y.o. male who presents today for left hand pain x 3 weeks. Worse when lying and in the morning. Has tried bracing with a cock-up wrist splint x 1 week after last seen in office and carpal tunnel suspected. Wearing brace during working hours and at night when goes to bed. Taking indomethacin without relief. Recent lab work and referral to rheumatology, appointment scheduled in July. Continues to have some numbness sensation through fingers of left hand.        [facility]    The following portions of the patient's history were reviewed and updated as appropriate: allergies, current medications, past family history, past medical history, past social history, past surgical history and problem list.     Social History     Tobacco Use   • Smoking status: Never Smoker   • Smokeless tobacco: Never Used   Substance Use Topics   • Alcohol use: Yes     Comment: daily beer and/or liquor       Past Medical History:   Diagnosis Date   • Eczema    • Glaucoma, left eye    • Hypertension        History reviewed. No pertinent surgical history.    Family History   Problem Relation Age of Onset   • Cancer Mother         Colon   • Stroke Father        Allergies   Allergen Reactions   • Latex Rash   • Penicillins Rash         Current Outpatient Medications:   •  ALLEGRA-D ALLERGY & CONGESTION  MG per 12 hr tablet, Take 1 tablet by mouth Every Morning., Disp: , Rfl:   •  allopurinol (ZYLOPRIM) 300 MG tablet, Take 1 tablet by mouth Daily., Disp: 30 tablet, Rfl: 5  •  COMBIGAN 0.2-0.5 % ophthalmic solution, instill 1 drop into both eyes twice a day, Disp: , Rfl: 0  •  DUPIXENT 300 MG/2ML solution prefilled syringe, , Disp: , Rfl:   •  EPINEPHrine (EPIPEN) 0.3 MG/0.3ML solution auto-injector injection, inject 1 PEN INJECTOR intramuscularly as directed, Disp: , Rfl: 0  •  indomethacin (INDOCIN) 50 MG capsule, Take 1 capsule by  "mouth 3 (Three) Times a Day As Needed for Mild Pain ., Disp: 60 capsule, Rfl: 1  •  lisinopril (PRINIVIL,ZESTRIL) 20 MG tablet, Take 1 tablet by mouth Daily., Disp: 30 tablet, Rfl: 5  •  RESTASIS 0.05 % ophthalmic emulsion, instill 1 drop into both eyes twice a day, Disp: , Rfl: 1  •  TOBRADEX 0.3-0.1 % ophthalmic ointment, , Disp: , Rfl: 0  •  methylPREDNISolone (MEDROL, RAJINDER,) 4 MG tablet, Take as directed on package instructions., Disp: 21 each, Rfl: 0    Review of Systems  Review of Systems   Constitutional: Negative for appetite change, chills, diaphoresis, fatigue and fever.   Eyes: Negative for visual disturbance.   Respiratory: Negative for cough, chest tightness, shortness of breath and wheezing.    Cardiovascular: Negative for chest pain and palpitations.   Musculoskeletal: Positive for arthralgias and joint swelling. Negative for myalgias.   Skin: Negative for color change, rash and wound.   Neurological: Negative for dizziness, weakness, light-headedness and headaches.   Psychiatric/Behavioral: Negative for sleep disturbance.       Vitals:  Vitals:    06/18/20 1255   BP: 118/80   Pulse: 74   Temp: 97.1 °F (36.2 °C)   SpO2: 99%   Weight: 84.8 kg (187 lb)   Height: 165.1 cm (65\")   PainSc:   8   PainLoc: Hand       Physical Exam  Physical Exam   Constitutional: He is oriented to person, place, and time. Vital signs are normal. He appears well-developed and well-nourished.   Cardiovascular: Normal rate, regular rhythm and normal heart sounds.   Pulmonary/Chest: Effort normal and breath sounds normal. He has no decreased breath sounds. He has no wheezes.   Musculoskeletal:        Right hand: He exhibits decreased range of motion, tenderness and swelling. He exhibits normal capillary refill, no deformity and no laceration. Decreased sensation noted. Decreased sensation is present in the radial distribution. Decreased strength noted.   Neurological: He is alert and oriented to person, place, and time.   Skin: " Skin is warm and dry.   Psychiatric: He has a normal mood and affect. His behavior is normal.   Nursing note and vitals reviewed.      Labs  Results for orders placed or performed in visit on 06/10/20   Uric acid   Result Value Ref Range    Uric Acid 6.7 3.4 - 7.0 mg/dL   Comprehensive Metabolic Panel   Result Value Ref Range    Glucose 157 (H) 65 - 99 mg/dL    BUN 16 8 - 23 mg/dL    Creatinine 1.11 0.76 - 1.27 mg/dL    Sodium 133 (L) 136 - 145 mmol/L    Potassium 3.2 (L) 3.5 - 5.2 mmol/L    Chloride 92 (L) 98 - 107 mmol/L    CO2 26.5 22.0 - 29.0 mmol/L    Calcium 9.6 8.6 - 10.5 mg/dL    Total Protein 7.3 6.0 - 8.5 g/dL    Albumin 4.10 3.50 - 5.20 g/dL    ALT (SGPT) 13 1 - 41 U/L    AST (SGOT) 11 1 - 40 U/L    Alkaline Phosphatase 60 39 - 117 U/L    Total Bilirubin 0.3 0.2 - 1.2 mg/dL    eGFR Non African Amer 68 >60 mL/min/1.73    Globulin 3.2 gm/dL    A/G Ratio 1.3 g/dL    BUN/Creatinine Ratio 14.4 7.0 - 25.0    Anion Gap 14.5 5.0 - 15.0 mmol/L   Sedimentation Rate   Result Value Ref Range    Sed Rate 40 (H) 0 - 20 mm/hr   C-reactive protein   Result Value Ref Range    C-Reactive Protein 5.90 (H) 0.00 - 0.50 mg/dL   MILTON   Result Value Ref Range    MILTON Direct Negative Negative   Rheumatoid Factor   Result Value Ref Range    Rheumatoid Factor Quantitative 12.8 0.0 - 14.0 IU/mL       Assessment/Plan    Kenn was seen today for hand injury.    Diagnoses and all orders for this visit:    Pain in joints of left hand  -     methylPREDNISolone (MEDROL, RAJINDER,) 4 MG tablet; Take as directed on package instructions.    Suspect rheumatoid arthritis or likeness based on previous lab findings and recent treatment plan. Will trial oral steroids at this time while awaiting rheumatology appointment.     Plan of care reviewed with patient at conclusion of today's visit. Patient education was provided regarding diagnosis, management, and prescribed or recommended OTC medications. Patient was informed to notify office of any new,  worsening, or persistent symptoms. Patient verbalized understanding and agreement with plan of care.     Follow-Up  Return if symptoms worsen or fail to improve.      Electronically Signed By:  TIANNA Alonso/Transcription Disclaimer:  Please note that portions of this encounter note were completed using electronic transcription/translation of spoken language to printed text.  The electronic transcription/translation of spoken language may permit erroneous, or at times, nonsensical words or phrases to be inadvertently transcribed.  Although I have reviewed the note for such errors, some may still exist in this documentation.

## 2020-06-22 ENCOUNTER — TELEPHONE (OUTPATIENT)
Dept: INTERNAL MEDICINE | Facility: CLINIC | Age: 60
End: 2020-06-22

## 2020-06-23 RX ORDER — PREDNISONE 10 MG/1
20 TABLET ORAL DAILY
Qty: 10 TABLET | Refills: 0 | Status: SHIPPED | OUTPATIENT
Start: 2020-06-23 | End: 2020-10-28

## 2020-06-23 NOTE — TELEPHONE ENCOUNTER
I've extended his prednisone for 5 more days.  Please have him take it as directed, with food.    thanks

## 2020-06-23 NOTE — TELEPHONE ENCOUNTER
Called and informed patient of lab results.  Advised on diet modifications and exercise recommendations.  Patient has follow up appointment scheduled.    Patient states he is still having trouble with his hand and doesn't have an appointment with the hand specialist until July.  Want to know if there is anything else he can do until then.

## 2020-07-24 ENCOUNTER — OFFICE VISIT (OUTPATIENT)
Dept: INTERNAL MEDICINE | Facility: CLINIC | Age: 60
End: 2020-07-24

## 2020-07-24 VITALS
DIASTOLIC BLOOD PRESSURE: 86 MMHG | HEART RATE: 80 BPM | SYSTOLIC BLOOD PRESSURE: 122 MMHG | WEIGHT: 178.2 LBS | OXYGEN SATURATION: 98 % | BODY MASS INDEX: 29.65 KG/M2

## 2020-07-24 DIAGNOSIS — M25.562 ARTHRALGIA OF BOTH KNEES: ICD-10-CM

## 2020-07-24 DIAGNOSIS — M25.561 ARTHRALGIA OF BOTH KNEES: ICD-10-CM

## 2020-07-24 DIAGNOSIS — I10 ESSENTIAL HYPERTENSION: Primary | ICD-10-CM

## 2020-07-24 DIAGNOSIS — E11.59 TYPE 2 DIABETES MELLITUS WITH OTHER CIRCULATORY COMPLICATION, WITHOUT LONG-TERM CURRENT USE OF INSULIN (HCC): ICD-10-CM

## 2020-07-24 PROBLEM — M1A.09X0 CHRONIC GOUT OF MULTIPLE SITES: Status: ACTIVE | Noted: 2020-07-24

## 2020-07-24 PROCEDURE — 99213 OFFICE O/P EST LOW 20 MIN: CPT | Performed by: NURSE PRACTITIONER

## 2020-07-24 RX ORDER — LANCETS
EACH MISCELLANEOUS
Qty: 1 EACH | Refills: 0 | Status: SHIPPED | OUTPATIENT
Start: 2020-07-24

## 2020-07-24 NOTE — PROGRESS NOTES
Chief Complaint   Patient presents with   • Follow-up       History of Present Illness    Kenn Hawthorne is a 60 y.o. male who presents today for follow-up of arthritis. Recently saw rheumatologist Raheem Sanz and [facility] last Tuesday and was weaned down off of Allopurinol due to decreased renal function. He had recurrence of joint pain after increase in physical activity and was subsequently prescribed medrol dose kimberly which was finished 2 days ago. Reports pain has mostly resolved but knees are still bothersome. Reports rheumatologist is unsure whether symptoms have been caused by gout or arthritis and plans to continue work up of joint pain. Also started on Vitamin D once per week for 4 weeks. Xrays performed in rheumatology office negative for acute fracture. Still wearing brace at night and during working hours. Has cut out red meat intake significantly. Has decreased ETOH intake and attempts to have only 1 drink every 20 days. Has started to drink more hot tea instead. Physical activity has been limited in the last week due to joint pain but typically exercises 4-5 days/week.    Blood pressure medication switched from HCTZ to Lisinopril about 6 weeks ago secondary to recurrent gout. Patient reports BP at home has been stable, unable to recall logged readings. Denies chest pains, palpitations, shortness breath, difficulty breathing, headaches lightheadedness, dizziness, periopheral edema.    Recent diagnosis of diabetes after screening labwork obtained. Patient reports having cut out processed foods and carbohydrates. He does endorse frequent eating of crackers and pastries which has been more difficult for him to cut out but has been making a concerted effort to avoid. He has been staying physically active within physical limitations per above. He denies polyuria, polydipsia, polyphagia, paresthesias.       PMSFH    The following portions of the patient's history were reviewed and  updated as appropriate: allergies, current medications, past family history, past medical history, past social history, past surgical history and problem list.     Social History     Tobacco Use   • Smoking status: Never Smoker   • Smokeless tobacco: Never Used   Substance Use Topics   • Alcohol use: Yes     Comment: daily beer and/or liquor       Past Medical History:   Diagnosis Date   • Eczema    • Glaucoma, left eye    • Hypertension        No past surgical history on file.    Family History   Problem Relation Age of Onset   • Cancer Mother         Colon   • Stroke Father        Allergies   Allergen Reactions   • Latex Rash   • Penicillins Rash         Current Outpatient Medications:   •  ALLEGRA-D ALLERGY & CONGESTION  MG per 12 hr tablet, Take 1 tablet by mouth Every Morning., Disp: , Rfl:   •  COMBIGAN 0.2-0.5 % ophthalmic solution, instill 1 drop into both eyes twice a day, Disp: , Rfl: 0  •  DUPIXENT 300 MG/2ML solution prefilled syringe, , Disp: , Rfl:   •  EPINEPHrine (EPIPEN) 0.3 MG/0.3ML solution auto-injector injection, inject 1 PEN INJECTOR intramuscularly as directed, Disp: , Rfl: 0  •  indomethacin (INDOCIN) 50 MG capsule, Take 1 capsule by mouth 3 (Three) Times a Day As Needed for Mild Pain ., Disp: 60 capsule, Rfl: 1  •  lisinopril (PRINIVIL,ZESTRIL) 20 MG tablet, Take 1 tablet by mouth Daily., Disp: 30 tablet, Rfl: 5  •  predniSONE (DELTASONE) 10 MG tablet, Take 2 tablets by mouth Daily., Disp: 10 tablet, Rfl: 0  •  RESTASIS 0.05 % ophthalmic emulsion, instill 1 drop into both eyes twice a day, Disp: , Rfl: 1  •  TOBRADEX 0.3-0.1 % ophthalmic ointment, , Disp: , Rfl: 0  •  Blood Glucose Monitoring Suppl w/Device kit, 1 kit 4 (Four) Times a Day Before Meals & at Bedtime., Disp: 1 each, Rfl: 0  •  glucose blood test strip, Use as instructed QID before meals and at bedtime, Disp: 100 each, Rfl: 3  •  Lancets (ACCU-CHEK MULTICLIX) lancets, Use as instructed QID before meals and at bedtime,  Disp: 1 each, Rfl: 0    Review of Systems  Review of Systems   Constitutional: Negative for activity change, appetite change, chills, diaphoresis, fatigue and fever.   Eyes: Negative for visual disturbance.   Respiratory: Negative for cough, chest tightness, shortness of breath and wheezing.    Cardiovascular: Negative for chest pain, palpitations and leg swelling.   Gastrointestinal: Negative for abdominal pain, constipation, diarrhea, nausea and vomiting.   Endocrine: Negative for polydipsia, polyphagia and polyuria.   Genitourinary: Negative for dysuria and frequency.   Musculoskeletal: Positive for arthralgias and joint swelling. Negative for gait problem, myalgias, neck pain and neck stiffness.   Skin: Negative for color change and wound.   Neurological: Negative for dizziness, syncope, weakness, light-headedness and headaches.   Psychiatric/Behavioral: Negative for confusion, decreased concentration and sleep disturbance. The patient is not nervous/anxious.        Vitals:  Vitals:    07/24/20 0826   BP: 122/86   Pulse: 80   SpO2: 98%   Weight: 80.8 kg (178 lb 3.2 oz)   PainSc: 0-No pain       Physical Exam  Physical Exam   Constitutional: He is oriented to person, place, and time. Vital signs are normal. He appears well-developed and well-nourished.  Non-toxic appearance. No distress.   HENT:   Head: Normocephalic and atraumatic.   Eyes: Conjunctivae and lids are normal.   Neck: Trachea normal and phonation normal. Neck supple. Carotid bruit is not present.   Cardiovascular: Normal rate, regular rhythm, normal heart sounds, intact distal pulses and normal pulses.   Pulses:       Carotid pulses are 2+ on the right side, and 2+ on the left side.       Radial pulses are 2+ on the right side, and 2+ on the left side.        Dorsalis pedis pulses are 2+ on the right side, and 2+ on the left side.        Posterior tibial pulses are 2+ on the right side, and 2+ on the left side.   No edema     Pulmonary/Chest: Effort  normal and breath sounds normal. No respiratory distress. He has no decreased breath sounds. He has no wheezes. He has no rhonchi. He has no rales.   Musculoskeletal:        Left knee: He exhibits swelling. He exhibits normal range of motion, no effusion, no ecchymosis, no deformity, no laceration, no erythema, normal alignment, no LCL laxity and no MCL laxity. Tenderness found.        Left hand: He exhibits decreased range of motion and swelling. He exhibits no tenderness, no bony tenderness, normal capillary refill, no deformity and no laceration. Normal sensation noted. Decreased strength noted.   Neurological: He is alert and oriented to person, place, and time.   Skin: Skin is warm, dry and intact. Capillary refill takes less than 2 seconds. No rash noted.   Psychiatric: He has a normal mood and affect. His behavior is normal.   Nursing note and vitals reviewed.      Labs  Results for orders placed or performed in visit on 06/18/20   Hemoglobin A1c   Result Value Ref Range    Hemoglobin A1C 6.50 (H) 4.80 - 5.60 %       Assessment/Plan    Kenn was seen today for follow-up.    Diagnoses and all orders for this visit:    Essential hypertension  Blood pressure is well controlled with change in medication regimen to lisinopril, continue as prescribed.  Patient advised to continue with home blood pressure monitoring with goal <130/80, continued regular physical activity within limitations to a goal of 30 to 45 minutes of moderate to vigorously intense exercise on 4 to 5 days/week.  Discussed in depth necessity of dietary sodium restriction less than 2400mg/day preferably <1500mg/day, DASH diet recommended.    Type 2 diabetes mellitus with other circulatory complication, without long-term current use of insulin (CMS/formerly [facility])  -     Blood Glucose Monitoring Suppl w/Device kit; 1 kit 4 (Four) Times a Day Before Meals & at Bedtime.  -     glucose blood test strip; Use as instructed QID before meals and at bedtime  -      Lancets (ACCU-CHEK MULTICLIX) lancets; Use as instructed QID before meals and at bedtime  New diagnosis of diabetes with A1c 6.5%. I have discussed with patient initiation of oral therapy and education has been provided at length regarding necessity of lifestyle modifications.  At this time patient would like to proceed with lifestyle modifications for trial period of 3 to 6 months in order to attain A1c goals less than 6.4% without initiating antidiabetic agents.  Discussed goal A1C < 7 and blood glucose <150 fasting, <180 2 hr postprandial.  Prescribed glucose monitoring kit and patient advised to check bs fasting and post prandial 2-3x/day and log results or bring meter to next scheduled follow-up.  Patient education has been provided on functionality of meter. Advised diet: 4-5 carb servings per meal for males; Spread carb intake throughout the day; Increase lean protein and vegetable intake; Avoid sugary drinks and processed carbs including crackers, cookies, cakes. Exercise: Recommend 30-45 minutes of moderate to vigorous intensity exercise 4-5 days/week. Increase exercise gradually. Additional diabetes education discussed this visit includes: Long term diabetic complications with potential for target organ damage, recommendation for annual eye exams with ophthalmology, dental hygiene, and foot care.  Plan to follow-up in 4 months for reevaluation.    Arthralgia of both knees  Patient to continue follow-up with [facility] for further evaluation and management of idiopathic gout versus osteoarthritis and/or autoimmune involvement.  Continue medications as prescribed at this time.  Will request most recent laboratory work from rheumatology for our records.      Plan of care reviewed with patient at conclusion of today's visit. Patient education was provided regarding diagnosis, management, and prescribed or recommended OTC medications. Patient was informed to notify office of any new,  worsening, or persistent symptoms. Patient verbalized understanding and agreement with plan of care.     Follow-Up  Return in about 4 months (around 11/24/2020) for F/U with PCP.      Electronically Signed By:  TIANNA Alonso/Transcription Disclaimer:  Please note that portions of this encounter note were completed using electronic transcription/translation of spoken language to printed text.  The electronic transcription/translation of spoken language may permit erroneous, or at times, nonsensical words or phrases to be inadvertently transcribed.  Although I have reviewed the note for such errors, some may still exist in this documentation.

## 2020-07-27 ENCOUNTER — TELEPHONE (OUTPATIENT)
Dept: INTERNAL MEDICINE | Facility: CLINIC | Age: 60
End: 2020-07-27

## 2020-07-27 NOTE — TELEPHONE ENCOUNTER
----- Message from TIANNA Alonso sent at 7/24/2020 11:28 AM EDT -----  Please request records from most recent referral to [facility] for our review. Thank you.

## 2020-08-17 ENCOUNTER — LAB (OUTPATIENT)
Dept: LAB | Facility: HOSPITAL | Age: 60
End: 2020-08-17

## 2020-08-17 ENCOUNTER — TRANSCRIBE ORDERS (OUTPATIENT)
Dept: LAB | Facility: HOSPITAL | Age: 60
End: 2020-08-17

## 2020-08-17 DIAGNOSIS — N28.9 URETERAL SLUDGE: Primary | ICD-10-CM

## 2020-08-17 DIAGNOSIS — N28.9 URETERAL SLUDGE: ICD-10-CM

## 2020-08-17 LAB
ALBUMIN SERPL-MCNC: 4.2 G/DL (ref 3.5–5.2)
ANION GAP SERPL CALCULATED.3IONS-SCNC: 8.6 MMOL/L (ref 5–15)
BACTERIA UR QL AUTO: ABNORMAL /HPF
BILIRUB UR QL STRIP: NEGATIVE
BUN SERPL-MCNC: 13 MG/DL (ref 8–23)
BUN/CREAT SERPL: 14.3 (ref 7–25)
CALCIUM SPEC-SCNC: 9.7 MG/DL (ref 8.6–10.5)
CHLORIDE SERPL-SCNC: 102 MMOL/L (ref 98–107)
CLARITY UR: CLEAR
CO2 SERPL-SCNC: 26.4 MMOL/L (ref 22–29)
COLOR UR: YELLOW
CREAT SERPL-MCNC: 0.91 MG/DL (ref 0.76–1.27)
CREAT UR-MCNC: 102 MG/DL
GFR SERPL CREATININE-BSD FRML MDRD: 85 ML/MIN/1.73
GLUCOSE SERPL-MCNC: 120 MG/DL (ref 65–99)
GLUCOSE UR STRIP-MCNC: NEGATIVE MG/DL
HGB UR QL STRIP.AUTO: NEGATIVE
HYALINE CASTS UR QL AUTO: ABNORMAL /LPF
KETONES UR QL STRIP: NEGATIVE
LEUKOCYTE ESTERASE UR QL STRIP.AUTO: NEGATIVE
NITRITE UR QL STRIP: NEGATIVE
PH UR STRIP.AUTO: 6 [PH] (ref 5–8)
PHOSPHATE SERPL-MCNC: 4 MG/DL (ref 2.5–4.5)
POTASSIUM SERPL-SCNC: 4.5 MMOL/L (ref 3.5–5.2)
PROT UR QL STRIP: NEGATIVE
PROT UR-MCNC: 6 MG/DL
RBC # UR: ABNORMAL /HPF
REF LAB TEST METHOD: ABNORMAL
SODIUM SERPL-SCNC: 137 MMOL/L (ref 136–145)
SP GR UR STRIP: 1.02 (ref 1–1.03)
SQUAMOUS #/AREA URNS HPF: ABNORMAL /HPF
UROBILINOGEN UR QL STRIP: NORMAL
WBC UR QL AUTO: ABNORMAL /HPF

## 2020-08-17 PROCEDURE — 81001 URINALYSIS AUTO W/SCOPE: CPT | Performed by: INTERNAL MEDICINE

## 2020-08-17 PROCEDURE — 84156 ASSAY OF PROTEIN URINE: CPT | Performed by: INTERNAL MEDICINE

## 2020-08-17 PROCEDURE — 82570 ASSAY OF URINE CREATININE: CPT | Performed by: INTERNAL MEDICINE

## 2020-08-17 PROCEDURE — 85025 COMPLETE CBC W/AUTO DIFF WBC: CPT | Performed by: INTERNAL MEDICINE

## 2020-08-17 PROCEDURE — 80069 RENAL FUNCTION PANEL: CPT | Performed by: INTERNAL MEDICINE

## 2020-08-17 PROCEDURE — 36415 COLL VENOUS BLD VENIPUNCTURE: CPT

## 2020-08-18 LAB
BASOPHILS # BLD AUTO: 0.02 10*3/MM3 (ref 0–0.2)
BASOPHILS NFR BLD AUTO: 0.3 % (ref 0–1.5)
DEPRECATED RDW RBC AUTO: 44.1 FL (ref 37–54)
EOSINOPHIL # BLD AUTO: 0.09 10*3/MM3 (ref 0–0.4)
EOSINOPHIL NFR BLD AUTO: 1.5 % (ref 0.3–6.2)
ERYTHROCYTE [DISTWIDTH] IN BLOOD BY AUTOMATED COUNT: 15.4 % (ref 12.3–15.4)
HCT VFR BLD AUTO: 34.5 % (ref 37.5–51)
HGB BLD-MCNC: 11.3 G/DL (ref 13–17.7)
IMM GRANULOCYTES # BLD AUTO: 0.02 10*3/MM3 (ref 0–0.05)
IMM GRANULOCYTES NFR BLD AUTO: 0.3 % (ref 0–0.5)
LYMPHOCYTES # BLD AUTO: 1.14 10*3/MM3 (ref 0.7–3.1)
LYMPHOCYTES NFR BLD AUTO: 18.6 % (ref 19.6–45.3)
MCH RBC QN AUTO: 26 PG (ref 26.6–33)
MCHC RBC AUTO-ENTMCNC: 32.8 G/DL (ref 31.5–35.7)
MCV RBC AUTO: 79.5 FL (ref 79–97)
MONOCYTES # BLD AUTO: 0.69 10*3/MM3 (ref 0.1–0.9)
MONOCYTES NFR BLD AUTO: 11.3 % (ref 5–12)
NEUTROPHILS NFR BLD AUTO: 4.16 10*3/MM3 (ref 1.7–7)
NEUTROPHILS NFR BLD AUTO: 68 % (ref 42.7–76)
NRBC BLD AUTO-RTO: 0 /100 WBC (ref 0–0.2)
PLATELET # BLD AUTO: 327 10*3/MM3 (ref 140–450)
PMV BLD AUTO: 10.1 FL (ref 6–12)
RBC # BLD AUTO: 4.34 10*6/MM3 (ref 4.14–5.8)
WBC # BLD AUTO: 6.12 10*3/MM3 (ref 3.4–10.8)

## 2020-09-13 DIAGNOSIS — M1A.09X0 IDIOPATHIC CHRONIC GOUT OF MULTIPLE SITES WITHOUT TOPHUS: ICD-10-CM

## 2020-09-15 RX ORDER — INDOMETHACIN 50 MG/1
CAPSULE ORAL
Qty: 60 CAPSULE | Refills: 1 | Status: SHIPPED | OUTPATIENT
Start: 2020-09-15 | End: 2020-10-28

## 2020-10-28 ENCOUNTER — OFFICE VISIT (OUTPATIENT)
Dept: INTERNAL MEDICINE | Facility: CLINIC | Age: 60
End: 2020-10-28

## 2020-10-28 VITALS
WEIGHT: 167 LBS | HEIGHT: 65 IN | TEMPERATURE: 97.5 F | OXYGEN SATURATION: 98 % | DIASTOLIC BLOOD PRESSURE: 88 MMHG | SYSTOLIC BLOOD PRESSURE: 130 MMHG | BODY MASS INDEX: 27.82 KG/M2 | HEART RATE: 68 BPM

## 2020-10-28 DIAGNOSIS — I10 ESSENTIAL HYPERTENSION: ICD-10-CM

## 2020-10-28 DIAGNOSIS — E11.59 TYPE 2 DIABETES MELLITUS WITH OTHER CIRCULATORY COMPLICATION, WITHOUT LONG-TERM CURRENT USE OF INSULIN (HCC): ICD-10-CM

## 2020-10-28 DIAGNOSIS — Z00.00 ANNUAL PHYSICAL EXAM: Primary | ICD-10-CM

## 2020-10-28 DIAGNOSIS — Z23 NEED FOR VACCINATION: ICD-10-CM

## 2020-10-28 LAB — HBA1C MFR BLD: 5.5 %

## 2020-10-28 PROCEDURE — 83036 HEMOGLOBIN GLYCOSYLATED A1C: CPT | Performed by: NURSE PRACTITIONER

## 2020-10-28 PROCEDURE — 99213 OFFICE O/P EST LOW 20 MIN: CPT | Performed by: NURSE PRACTITIONER

## 2020-10-28 PROCEDURE — 99396 PREV VISIT EST AGE 40-64: CPT | Performed by: NURSE PRACTITIONER

## 2020-10-28 RX ORDER — LISINOPRIL 20 MG/1
20 TABLET ORAL DAILY
Qty: 30 TABLET | Refills: 5 | Status: SHIPPED | OUTPATIENT
Start: 2020-10-28 | End: 2021-05-26

## 2020-10-28 RX ORDER — PREDNISONE 1 MG/1
5 TABLET ORAL AS NEEDED
COMMUNITY
End: 2022-02-12

## 2020-10-28 NOTE — PROGRESS NOTES
"Chief Complaint   Patient presents with   • Annual Exam       History of Present Illness    Kenn Hawthorne is a 60 y.o. male who presents today for an annual physical exam.    CV- Monitoring blood pressure every other day with readings 120s/80s. Takes medication at night. Has been monitoring sodium intake. Replacing salt with peppers and spices. Denies headaches, lightheadedness, dizziness, chest pain, palpitations, peripheral edema, vision changes.    DM- Monitoring blood glucose at home every other day with readings . Trying to maintain a strict diet and has cut back on red meat to once every other week. Otherwise eating chicken and fish, green vegetables, fruits. Drinking water throughout the day. Exercising daily with walking, running.    Gout- Has cut back on alcohol intake and deli meats; once a week on weekends consumes alcohol- beer, rum. Seeing rheumatology for arthritis, has ruled out rheumatoid disease. Taking prednisone 5 mg daily as well as 2 other unrecalled medications. Had labs performed and xrays at last office visit. Was referred to nephrology due to decreased renal function, which resolved. Was advised to avoid nephrotoxic agents and reduce dietary sodium intake. A1c- 5.5    Derm- Dupixent injections for eczema.       Annual Exam  The patient is being seen for a health maintenance evaluation.  The last health maintenance was 1 year(s) ago.    Social History  Kenn  does not smoke cigarettes.   He drinks occasional alcohol.  He does not use illicit drugs.    General History  Kenn  does have regular dental visits. Generally goes every 6 months for cleanings.  He does complain of vision problems. Last eye exam - has appointment tomorrow.  Immunizations are not up to date. The patient needs the following immunizations: Influenza and PPSV23    Lifestyle  Kenn  consumes a in general, a \"healthy\" diet  .  He exercises daily.    Reproductive Health  Kenn  is sexually active. His contraceptive " plan is no method.   He does not have erectile dysfunction.     Screening  Last PSA was 10/25/2019.  Last prostate exam was  N/A. Family history of prostate cancer: none  Last testicular exam was self-performed.   Last colonoscopy was  with Dr. Ureña. Family history of colon cancer: mother  from colon cancer.   Other pertinent family history and/or screenings: Father with cardiovascular disease.      Wearing seatbelt, smoke detectors in home.  Sleeping well, feels increased energy. Patient denies fever, chills, headache, ear pain, sore throat, shortness of air, cough, wheezing, chest pain, palpitations, abdominal pain, nausea, vomiting, diarrhea, dysuria, blood in stool or urine. Mood is stable. Eating and drinking as usual. No unexplained weight loss or gain.       Review of Systems  Review of Systems   Constitutional: Negative for appetite change, chills, diaphoresis, fatigue and fever.   HENT: Negative for congestion, ear pain, postnasal drip, rhinorrhea, sinus pressure, sinus pain, sneezing and sore throat.    Eyes: Negative for pain, discharge, redness, itching and visual disturbance.   Respiratory: Negative for cough, chest tightness, shortness of breath and wheezing.    Cardiovascular: Negative for chest pain, palpitations and leg swelling.   Gastrointestinal: Negative for abdominal pain, blood in stool, constipation, diarrhea, nausea and vomiting.   Endocrine: Negative for cold intolerance, heat intolerance, polydipsia, polyphagia and polyuria.   Genitourinary: Negative for dysuria and hematuria.   Musculoskeletal: Positive for arthralgias and joint swelling. Negative for back pain, myalgias, neck pain and neck stiffness.   Skin: Negative for color change, rash and wound.   Allergic/Immunologic: Negative for environmental allergies and food allergies.   Neurological: Negative for dizziness, weakness, light-headedness, numbness and headaches.   Hematological: Does not bruise/bleed easily.    Psychiatric/Behavioral: Negative for confusion, dysphoric mood, self-injury, sleep disturbance and suicidal ideas. The patient is not nervous/anxious.        [facility]    The following portions of the patient's history were reviewed and updated as appropriate: allergies, current medications, past family history, past medical history, past social history, past surgical history and problem list.     Social History     Tobacco Use   • Smoking status: Never Smoker   • Smokeless tobacco: Never Used   Substance Use Topics   • Alcohol use: Yes     Comment: daily beer and/or liquor       Past Medical History:   Diagnosis Date   • Eczema    • Glaucoma, left eye    • Hypertension        History reviewed. No pertinent surgical history.    Family History   Problem Relation Age of Onset   • Cancer Mother         Colon   • Stroke Father        Allergies   Allergen Reactions   • Latex Rash   • Penicillins Rash         Current Outpatient Medications:   •  ALLEGRA-D ALLERGY & CONGESTION  MG per 12 hr tablet, Take 1 tablet by mouth Every Morning., Disp: , Rfl:   •  Blood Glucose Monitoring Suppl w/Device kit, 1 kit 4 (Four) Times a Day Before Meals & at Bedtime., Disp: 1 each, Rfl: 0  •  COMBIGAN 0.2-0.5 % ophthalmic solution, instill 1 drop into both eyes twice a day, Disp: , Rfl: 0  •  DUPIXENT 300 MG/2ML solution prefilled syringe, , Disp: , Rfl:   •  EPINEPHrine (EPIPEN) 0.3 MG/0.3ML solution auto-injector injection, inject 1 PEN INJECTOR intramuscularly as directed, Disp: , Rfl: 0  •  glucose blood test strip, Use as instructed QID before meals and at bedtime, Disp: 100 each, Rfl: 3  •  Lancets (ACCU-CHEK MULTICLIX) lancets, Use as instructed QID before meals and at bedtime, Disp: 1 each, Rfl: 0  •  lisinopril (PRINIVIL,ZESTRIL) 20 MG tablet, Take 1 tablet by mouth Daily., Disp: 30 tablet, Rfl: 5  •  predniSONE (DELTASONE) 5 MG tablet, Take 5 mg by mouth Daily., Disp: , Rfl:   •  RESTASIS 0.05 % ophthalmic emulsion, instill  "1 drop into both eyes twice a day, Disp: , Rfl: 1  •  TOBRADEX 0.3-0.1 % ophthalmic ointment, , Disp: , Rfl: 0    Vitals:  Vitals:    10/28/20 1518   BP: 130/88   Pulse: 68   Temp: 97.5 °F (36.4 °C)   SpO2: 98%   Weight: 75.8 kg (167 lb)   Height: 165.1 cm (65\")   PainSc: 0-No pain       Physical Exam  Physical Exam  Vitals signs and nursing note reviewed.   Constitutional:       General: He is not in acute distress.     Appearance: Normal appearance. He is well-developed. He is not ill-appearing or toxic-appearing.   HENT:      Head: Normocephalic and atraumatic.      Right Ear: Tympanic membrane, ear canal and external ear normal.      Left Ear: Tympanic membrane, ear canal and external ear normal.      Nose: Nose normal.      Right Sinus: No maxillary sinus tenderness or frontal sinus tenderness.      Left Sinus: No maxillary sinus tenderness or frontal sinus tenderness.      Mouth/Throat:      Lips: Pink.      Mouth: Mucous membranes are moist.      Pharynx: Oropharynx is clear. Uvula midline.   Eyes:      General: Lids are normal.      Extraocular Movements: Extraocular movements intact.      Conjunctiva/sclera: Conjunctivae normal.      Pupils: Pupils are equal, round, and reactive to light.   Neck:      Musculoskeletal: Normal range of motion and neck supple.      Thyroid: No thyroid mass, thyromegaly or thyroid tenderness.      Vascular: No carotid bruit.      Trachea: Trachea and phonation normal. No tracheal deviation.   Cardiovascular:      Rate and Rhythm: Normal rate and regular rhythm.      Pulses:           Carotid pulses are 2+ on the right side and 2+ on the left side.       Radial pulses are 2+ on the right side and 2+ on the left side.        Dorsalis pedis pulses are 2+ on the right side and 2+ on the left side.      Heart sounds: Normal heart sounds. No murmur.   Pulmonary:      Effort: Pulmonary effort is normal. No respiratory distress.      Breath sounds: Normal breath sounds. No decreased " breath sounds, wheezing, rhonchi or rales.   Chest:      Chest wall: No tenderness.   Abdominal:      General: Bowel sounds are normal. There is no distension.      Palpations: Abdomen is soft. There is no mass.      Tenderness: There is no abdominal tenderness. There is no guarding or rebound.      Hernia: No hernia is present.   Musculoskeletal: Normal range of motion.         General: No tenderness or deformity.      Right lower leg: No edema.      Left lower leg: No edema.   Lymphadenopathy:      Head:      Right side of head: No submental, submandibular, tonsillar, preauricular or posterior auricular adenopathy.      Left side of head: No submental, submandibular, tonsillar, preauricular or posterior auricular adenopathy.      Cervical: No cervical adenopathy.   Skin:     General: Skin is warm and dry.      Capillary Refill: Capillary refill takes less than 2 seconds.      Findings: No rash.   Neurological:      Mental Status: He is alert and oriented to person, place, and time.      GCS: GCS eye subscore is 4. GCS verbal subscore is 5. GCS motor subscore is 6.      Cranial Nerves: No cranial nerve deficit.      Sensory: Sensation is intact.      Motor: Motor function is intact.      Coordination: Coordination is intact.      Gait: Gait is intact.      Deep Tendon Reflexes:      Reflex Scores:       Tricep reflexes are 2+ on the right side and 2+ on the left side.       Bicep reflexes are 2+ on the right side and 2+ on the left side.       Brachioradialis reflexes are 2+ on the right side and 2+ on the left side.       Patellar reflexes are 2+ on the right side and 2+ on the left side.  Psychiatric:         Attention and Perception: Attention and perception normal.         Mood and Affect: Mood normal.         Speech: Speech normal.         Behavior: Behavior normal. Behavior is cooperative.         Thought Content: Thought content normal.         Cognition and Memory: Cognition normal.         Judgment:  Judgment normal.         Labs  Results for orders placed or performed in visit on 10/28/20   POC Glycosylated Hemoglobin (Hb A1C)    Specimen: Blood   Result Value Ref Range    Hemoglobin A1C 5.5 %       Assessment/Plan    Diagnoses and all orders for this visit:    1. Annual physical exam (Primary)  The patient is here for an annual health maintenance visit.  Currently, the patient consumes a healthy diet and has an adequate exercise regimen. Screening lab work is ordered.  Immunizations discussed, patient to return after allergy injections are concluded to receive.  Advice and education is given regarding nutrition, aerobic exercise, routine dental evaluations, routine eye exams, reproductive health, cardiovascular risk reduction, sunscreen use, self skin examination (annual dermatology evaluations) and seat belt use (general overall safety).  Further recommendations after lab evaluation.  Annual wellness evaluations recommended.     2. Type 2 diabetes mellitus with other circulatory complication, without long-term current use of insulin (CMS/[facility])  -     POC Glycosylated Hemoglobin (Hb A1C)  A1C 5.5 decreased from 6.5 previously. Well controlled with diet and exercise. Recommend continued low carbohydrate diet and routine aerobic physical activity. Goal <7. Check bs AC and HS 2-3x/week and bring meter to next scheduled follow-up. Increase lean protein and vegetable intake; Avoid sugary drinks and processed carbs including crackers, cookies, cakes. Labs today. Education performed during this visit: long term diabetic complications discussed. Recommend annual eye exams with ophthalmology, discussed dental hygiene, and foot care reviewed. Medications, including side effects and compliance discussed carefully and Hypoglycemia management and prevention reviewed.     3. Need for vaccination  -     Pneumococcal Polysaccharide Vaccine 23-Valent (PPSV23) Greater Than or Equal To 1yo Subcutaneous / IM; Future    4.  Essential hypertension  -     lisinopril (PRINIVIL,ZESTRIL) 20 MG tablet; Take 1 tablet by mouth Daily.  Dispense: 30 tablet; Refill: 5  BP well controlled, continue medication as prescribed. Patient advised to continue with home blood pressure monitoring with goal <130/80, continued regular physical activity within limitations to a goal of 30 to 45 minutes of moderate to vigorously intense exercise on 4 to 5 days/week, goal of 150 minutes/week.  Discussed in depth necessity of dietary sodium restriction less than 2400mg/day preferably <1500mg/day, DASH diet recommended.      Plan of care reviewed with patient at conclusion of today's visit. Patient education was provided regarding diagnosis, management, and prescribed or recommended OTC medications. Patient was informed to notify office of any new, worsening, or persistent symptoms. Patient verbalized understanding and agreement with plan of care.     Follow-Up  Return in about 6 months (around 4/28/2021) for F/U with PCP.      Electronically Signed By:  TIANNA Alonso/Transcription Disclaimer:  Please note that portions of this encounter note were completed using electronic transcription/translation of spoken language to printed text.  The electronic transcription/translation of spoken language may permit erroneous, or at times, nonsensical words or phrases to be inadvertently transcribed.  Although I have reviewed the note for such errors, some may still exist in this documentation.

## 2020-12-02 ENCOUNTER — CLINICAL SUPPORT (OUTPATIENT)
Dept: INTERNAL MEDICINE | Facility: CLINIC | Age: 60
End: 2020-12-02

## 2020-12-02 DIAGNOSIS — Z23 NEED FOR INFLUENZA VACCINATION: ICD-10-CM

## 2020-12-02 PROCEDURE — 90686 IIV4 VACC NO PRSV 0.5 ML IM: CPT | Performed by: NURSE PRACTITIONER

## 2020-12-02 PROCEDURE — 90471 IMMUNIZATION ADMIN: CPT | Performed by: NURSE PRACTITIONER

## 2021-01-18 ENCOUNTER — TELEPHONE (OUTPATIENT)
Dept: INTERNAL MEDICINE | Facility: CLINIC | Age: 61
End: 2021-01-18

## 2021-01-18 NOTE — TELEPHONE ENCOUNTER
PATIENT CALLED TO GET ADVISE ON WHETHER HE WOULD TAKE THE COVID VACCINE.    PATIENT'S CONTACT [phone]

## 2021-01-19 NOTE — TELEPHONE ENCOUNTER
I would recommend patient review publicly available information in order to come to an informed decision as I have no additional information to provide at this time.

## 2021-01-25 PROCEDURE — U0004 COV-19 TEST NON-CDC HGH THRU: HCPCS | Performed by: NURSE PRACTITIONER

## 2021-01-26 ENCOUNTER — TELEPHONE (OUTPATIENT)
Dept: URGENT CARE | Facility: CLINIC | Age: 61
End: 2021-01-26

## 2021-05-26 DIAGNOSIS — I10 ESSENTIAL HYPERTENSION: ICD-10-CM

## 2021-05-26 RX ORDER — LISINOPRIL 20 MG/1
20 TABLET ORAL DAILY
Qty: 30 TABLET | Refills: 5 | Status: SHIPPED | OUTPATIENT
Start: 2021-05-26 | End: 2021-09-27 | Stop reason: SDUPTHER

## 2021-05-26 NOTE — TELEPHONE ENCOUNTER
Last Office Visit: 10/28/20  Next Office Visit:11/5/21    Labs completed in past 6 months? no  Labs completed in past year? yes    Last Refill Date:10/28/20  Quantity:30  Refills:5    Pharmacy:     Please review pended refill request for any changes needed on refills or quantities. Thank you!

## 2021-07-09 ENCOUNTER — TELEPHONE (OUTPATIENT)
Dept: INTERNAL MEDICINE | Facility: CLINIC | Age: 61
End: 2021-07-09

## 2021-08-14 ENCOUNTER — OFFICE VISIT (OUTPATIENT)
Dept: INTERNAL MEDICINE | Facility: CLINIC | Age: 61
End: 2021-08-14

## 2021-08-14 VITALS
HEART RATE: 62 BPM | BODY MASS INDEX: 28.49 KG/M2 | WEIGHT: 171 LBS | OXYGEN SATURATION: 98 % | TEMPERATURE: 98.3 F | HEIGHT: 65 IN

## 2021-08-14 DIAGNOSIS — N50.89 MASS OF LEFT TESTICLE: Primary | ICD-10-CM

## 2021-08-14 DIAGNOSIS — N43.3 HYDROCELE IN ADULT: ICD-10-CM

## 2021-08-14 DIAGNOSIS — R19.09 INGUINAL BULGE: ICD-10-CM

## 2021-08-14 PROBLEM — R79.89 ABNORMAL C-REACTIVE PROTEIN: Status: ACTIVE | Noted: 2021-08-14

## 2021-08-14 PROBLEM — M10.9 GOUT: Status: ACTIVE | Noted: 2021-08-14

## 2021-08-14 PROBLEM — M19.90 OSTEOARTHRITIS: Status: ACTIVE | Noted: 2021-08-14

## 2021-08-14 PROBLEM — R76.8 RHEUMATOID FACTOR POSITIVE: Status: ACTIVE | Noted: 2021-08-14

## 2021-08-14 PROCEDURE — 99213 OFFICE O/P EST LOW 20 MIN: CPT

## 2021-08-14 RX ORDER — FEBUXOSTAT 80 MG/1
TABLET, FILM COATED ORAL
COMMUNITY
Start: 2021-06-27

## 2021-08-14 RX ORDER — LIFITEGRAST 50 MG/ML
SOLUTION/ DROPS OPHTHALMIC
COMMUNITY
Start: 2021-05-24

## 2021-08-14 RX ORDER — TRAZODONE HYDROCHLORIDE 50 MG/1
1 TABLET ORAL DAILY
COMMUNITY
Start: 2021-04-23 | End: 2021-11-24

## 2021-08-14 RX ORDER — COLCHICINE 0.6 MG/1
1 TABLET ORAL EVERY 12 HOURS SCHEDULED
COMMUNITY
Start: 2021-04-23 | End: 2023-01-12

## 2021-08-14 NOTE — PROGRESS NOTES
Chief Complaint  Testicular Mass (Pt states x 2 weeks when noticed, swelling)    Kenn Hawthorne presents to [facility]    HPI: Noticed mass on the left testicle x 3 weeks. Also has a bulge on the right-sided inguinal area. Lump is non-tender. Bulge is non-tender. Both feel symmetrical and smooth. Reports increasing physical activity recently and has been playing soccer more frequently. Pain is 2 out of 10. No fever, chills, abdominal pain, nausea, vomiting, or change in bowel movements reported.       Subjective  {Problem List  Visit Diagnosis   Encounters  Notes  Medications  Labs  Result Review Imaging  Media :23}     Health Maintenance   Topic Date Due   • URINE MICROALBUMIN  Never done   • TDAP/TD VACCINES (1 - Tdap) Never done   • COLORECTAL CANCER SCREENING  01/01/2021   • HEMOGLOBIN A1C  04/28/2021   • ZOSTER VACCINE (1 of 2) 10/28/2021 (Originally 5/12/2010)   • Pneumococcal Vaccine 0-64 (1 of 2 - PPSV23) 10/31/2021 (Originally 5/12/1966)   • INFLUENZA VACCINE  10/01/2021   • DIABETIC FOOT EXAM  10/28/2021   • ANNUAL PHYSICAL  10/29/2021   • DIABETIC EYE EXAM  10/29/2021   • HEPATITIS C SCREENING  Completed   • COVID-19 Vaccine  Completed         [facility]  The following portions of the patient's history were reviewed and updated as appropriate: allergies, current medications, past family history, past medical history, past social history, past surgical history and problem list.     Past Medical History:   Diagnosis Date   • Eczema    • Glaucoma, left eye    • Hypertension       Allergies   Allergen Reactions   • Latex Rash   • Penicillins Rash      Social History     Tobacco Use   • Smoking status: Never Smoker   • Smokeless tobacco: Never Used   Substance Use Topics   • Alcohol use: Yes     Comment: daily beer and/or liquor   • Drug use: No     History reviewed. No pertinent surgical history.   Family History   Problem Relation Age of Onset   • Cancer Mother          Colon   • Stroke Father          Current Outpatient Medications:   •  ALLEGRA-D ALLERGY & CONGESTION  MG per 12 hr tablet, Take 1 tablet by mouth Every Morning., Disp: , Rfl:   •  Blood Glucose Monitoring Suppl w/Device kit, 1 kit 4 (Four) Times a Day Before Meals & at Bedtime., Disp: 1 each, Rfl: 0  •  colchicine 0.6 MG tablet, Take 1 tablet by mouth Every 12 (Twelve) Hours., Disp: , Rfl:   •  COMBIGAN 0.2-0.5 % ophthalmic solution, instill 1 drop into both eyes twice a day, Disp: , Rfl: 0  •  DUPIXENT 300 MG/2ML solution prefilled syringe, , Disp: , Rfl:   •  EPINEPHrine (EPIPEN) 0.3 MG/0.3ML solution auto-injector injection, inject 1 PEN INJECTOR intramuscularly as directed, Disp: , Rfl: 0  •  febuxostat (Uloric) 80 MG tablet tablet, TAKE 1 TABLET BY MOUTH EVERY DAY, Disp: , Rfl:   •  glucose blood test strip, Use as instructed QID before meals and at bedtime, Disp: 100 each, Rfl: 3  •  Lancets (ACCU-CHEK MULTICLIX) lancets, Use as instructed QID before meals and at bedtime, Disp: 1 each, Rfl: 0  •  lisinopril (PRINIVIL,ZESTRIL) 20 MG tablet, TAKE 1 TABLET BY MOUTH DAILY, Disp: 30 tablet, Rfl: 5  •  predniSONE (DELTASONE) 5 MG tablet, Take 5 mg by mouth Daily., Disp: , Rfl:   •  TOBRADEX 0.3-0.1 % ophthalmic ointment, , Disp: , Rfl: 0  •  traZODone (DESYREL) 50 MG tablet, Take 1 tablet by mouth Daily., Disp: , Rfl:   •  Xiidra 5 % ophthalmic solution, , Disp: , Rfl:     Review of Systems   Respiratory: Negative for apnea, cough, choking, chest tightness, shortness of breath, wheezing and stridor.    Cardiovascular: Negative for chest pain, palpitations and leg swelling.   Gastrointestinal: Negative for abdominal distention, abdominal pain, anal bleeding, blood in stool, constipation, diarrhea, nausea, rectal pain, vomiting, GERD and indigestion.   Genitourinary: Positive for scrotal swelling and testicular pain. Negative for breast discharge, decreased libido, decreased urine volume, difficulty urinating,  "discharge, dysuria, flank pain, frequency, genital sores, hematuria, nocturia, penile pain, erectile dysfunction, penile swelling, urgency and urinary incontinence.   Neurological: Negative for dizziness, tremors, seizures, syncope, facial asymmetry, speech difficulty, weakness, light-headedness, numbness, headache, memory problem and confusion.       Objective   Vital Signs  Pulse 62   Temp 98.3 °F (36.8 °C)   Ht 165.1 cm (65\")   Wt 77.6 kg (171 lb)   SpO2 98%   BMI 28.46 kg/m²     Physical Exam  Exam conducted with a chaperone present.   Constitutional:       Appearance: He is normal weight.   HENT:      Head: Normocephalic.      Right Ear: External ear normal.      Left Ear: External ear normal.      Mouth/Throat:      Mouth: Mucous membranes are dry.      Pharynx: Oropharynx is clear.   Eyes:      Extraocular Movements: Extraocular movements intact.      Pupils: Pupils are equal, round, and reactive to light.   Cardiovascular:      Rate and Rhythm: Normal rate and regular rhythm.      Pulses: Normal pulses.      Heart sounds: Normal heart sounds.   Pulmonary:      Effort: Pulmonary effort is normal.      Breath sounds: Normal breath sounds.   Abdominal:      Hernia: A hernia is present. Hernia is present in the right inguinal area. There is no hernia in the left inguinal area.   Genitourinary:     Penis: Normal.       Testes:         Right: Mass, tenderness or swelling not present. Right testis is descended. Cremasteric reflex is present.          Left: Mass and swelling present. Tenderness not present. Left testis is descended. Cremasteric reflex is present.       Epididymis:      Right: Normal.      Left: Normal.       Lymphadenopathy:      Lower Body: Right inguinal adenopathy present. No left inguinal adenopathy.   Neurological:      Mental Status: He is alert.   Psychiatric:         Attention and Perception: Attention and perception normal.         Mood and Affect: Mood and affect normal.         " Speech: Speech normal.         Behavior: Behavior normal. Behavior is cooperative.         Thought Content: Thought content normal.         Cognition and Memory: Cognition normal.         Judgment: Judgment normal.          Result Review :     The following data was reviewed by: TIANNA Parker on 08/14/2021:    Assessment and Plan    1. Mass of left testicle  - US Scrotum & Testicles; Future  - Will get US to assess for increased swelling of the left side of scrotum. Likely a hydrocele  - Will refer or treat as appropriate.   2. Inguinal bulge  - US Abdomen Limited; Future  - Will obtain US to assess for inguinal hernia vs swollen inguinal lymph node.  - Will treat and/or refer as appropriate.     Follow Up     No follow-ups on file.    Patient was given instructions and counseling regarding his condition or for health maintenance advice. Please see specific information pulled into the AVS if appropriate.    Part of this note may be an electronic transcription/translation of spoken language to printed text using the Dragon Dictation System.    Electronically signed by:   TIANNA Parker  08/14/2021

## 2021-08-25 ENCOUNTER — HOSPITAL ENCOUNTER (OUTPATIENT)
Dept: ULTRASOUND IMAGING | Facility: HOSPITAL | Age: 61
Discharge: HOME | End: 2021-08-25

## 2021-08-25 DIAGNOSIS — R19.09 INGUINAL BULGE: ICD-10-CM

## 2021-08-25 DIAGNOSIS — N50.89 MASS OF LEFT TESTICLE: ICD-10-CM

## 2021-08-25 PROCEDURE — 76870 US EXAM SCROTUM: CPT

## 2021-08-25 PROCEDURE — 76857 US EXAM PELVIC LIMITED: CPT

## 2021-09-10 ENCOUNTER — OFFICE VISIT (OUTPATIENT)
Dept: UROLOGY | Facility: CLINIC | Age: 61
End: 2021-09-10

## 2021-09-10 VITALS
HEIGHT: 65 IN | SYSTOLIC BLOOD PRESSURE: 132 MMHG | BODY MASS INDEX: 32.65 KG/M2 | DIASTOLIC BLOOD PRESSURE: 72 MMHG | HEART RATE: 60 BPM | OXYGEN SATURATION: 98 % | WEIGHT: 196 LBS | TEMPERATURE: 98.8 F

## 2021-09-10 DIAGNOSIS — N40.1 BENIGN LOCALIZED PROSTATIC HYPERPLASIA WITH LOWER URINARY TRACT SYMPTOMS (LUTS): ICD-10-CM

## 2021-09-10 DIAGNOSIS — N43.3 HYDROCELE IN ADULT: Primary | ICD-10-CM

## 2021-09-10 DIAGNOSIS — Z12.5 PROSTATE CANCER SCREENING: ICD-10-CM

## 2021-09-10 LAB
BILIRUB BLD-MCNC: NEGATIVE MG/DL
CLARITY, POC: CLEAR
COLOR UR: YELLOW
GLUCOSE UR STRIP-MCNC: NEGATIVE MG/DL
KETONES UR QL: NEGATIVE
LEUKOCYTE EST, POC: NEGATIVE
NITRITE UR-MCNC: NEGATIVE MG/ML
PH UR: 6 [PH] (ref 5–8)
PROT UR STRIP-MCNC: NEGATIVE MG/DL
RBC # UR STRIP: NEGATIVE /UL
SP GR UR: 1.02 (ref 1–1.03)
UROBILINOGEN UR QL: NORMAL

## 2021-09-10 PROCEDURE — 81003 URINALYSIS AUTO W/O SCOPE: CPT | Performed by: STUDENT IN AN ORGANIZED HEALTH CARE EDUCATION/TRAINING PROGRAM

## 2021-09-10 PROCEDURE — 99204 OFFICE O/P NEW MOD 45 MIN: CPT | Performed by: STUDENT IN AN ORGANIZED HEALTH CARE EDUCATION/TRAINING PROGRAM

## 2021-09-10 RX ORDER — TAMSULOSIN HYDROCHLORIDE 0.4 MG/1
1 CAPSULE ORAL DAILY
Qty: 90 CAPSULE | Refills: 3 | Status: SHIPPED | OUTPATIENT
Start: 2021-09-10 | End: 2021-11-24

## 2021-09-10 RX ORDER — BIMATOPROST 0.01 %
1 DROPS OPHTHALMIC (EYE) NIGHTLY
COMMUNITY
Start: 2021-09-01

## 2021-09-10 NOTE — PROGRESS NOTES
Office Visit New Urology      Patient Name: Kenn Hawthorne  : 1960   MRN: [id number]     Chief Complaint:    Chief Complaint   Patient presents with   • New Patient   • Hydrocele       Referring Provider: Krzysztof Suggs APRN    History of Present Illness: Kenn Hawthorne is a 61 y.o. male with history of hypertension, gout, diabetes, and glaucoma who presents to Urology today for evaluation of lower urinary tract symptoms as well as concern for bilateral hydroceles. Patient presented to his primary care TIANNA Leblanc on 2021, he reported a bulge in the right sided inguinal area and a mass in the left testicle x3 weeks. He underwent a scrotal ultrasound, this demonstrated moderate bilateral hydroceles and normal-appearing testicles other than some small epididymal cysts on the right. Patient complains of a right-sided thigh/inguinal bulge, this was also evaluated with ultrasound and was found to be a 4 cm lipoma which is not particularly bothersome to the patient.    Patient is a  here in [place], he denies significant scrotal tenderness, no testicular pain, no epididymal pain whatsoever. His primary urinary complaint is urinary urgency, frequency, reduced stream over the last few years and nocturia a few times a night. He is reduced his fluid intake prior to bed which is significantly helped however he still struggles with weak stream and urgency during the day.    He has undergone prior PSA screening in 2019, his PSA value was normal at 1.42.    Lab Results   Component Value Date    PSA 1.420 10/25/2019     Patient reports a history of testicular cancer in his brother and wanted to follow with a urologist given his anxiety. We reassured him today that his testicular ultrasound demonstrated no concern for testicular masses, his physical exam today demonstrates mild to moderate bilateral hydroceles which are completely asymptomatic and not bothersome to the  patient.    Patient has no family history of prostate cancer that he is aware of.    He is a never smoker, denies gross hematuria, no prior history of kidney stones.    He is sexually active with his wife, he denies any issues at all with erections.    His urinalysis today is completely negative, no blood, nitrite, leukocyte esterase. Reviewed patient's recent labs, August 17, 2020, creatinine 0.91 with a corresponding GFR of 85. His hemoglobin A1c was last checked October 2020 and it was 5.5.      Subjective      Review of System: Review of Systems   Constitutional: Negative for chills, fatigue, fever and unexpected weight change.   HENT: Negative for sore throat.    Respiratory: Negative for cough, chest tightness and shortness of breath.    Cardiovascular: Negative for chest pain and leg swelling.   Gastrointestinal: Negative for blood in stool, constipation, diarrhea, nausea, rectal pain and vomiting.   Genitourinary: Negative for decreased urine volume, difficulty urinating, dysuria, enuresis, flank pain, frequency, genital sores, hematuria and urgency.   Musculoskeletal: Negative for back pain and joint swelling.   Skin: Negative for rash and wound.   Neurological: Negative for seizures, speech difficulty, weakness and headaches.   Psychiatric/Behavioral: Negative for confusion, sleep disturbance and suicidal ideas. The patient is nervous/anxious.       I have reviewed the ROS documented by my clinical staff, I have updated appropriately and I agree. Casey Mcarthur MD    Past Medical History:   Past Medical History:   Diagnosis Date   • Eczema    • Glaucoma, left eye    • Hypertension        Past Surgical History:   Past Surgical History:   Procedure Laterality Date   • BICEPS TENDON REPAIR         Family History:   Family History   Problem Relation Age of Onset   • Cancer Mother         Colon   • Stroke Father        Social History:   Social History     Socioeconomic History   • Marital status: Single      Spouse name: Not on file   • Number of children: Not on file   • Years of education: Not on file   • Highest education level: Not on file   Tobacco Use   • Smoking status: Former Smoker     Packs/day: 0.25     Years: 25.00     Pack years: 6.25   • Smokeless tobacco: Never Used   Vaping Use   • Vaping Use: Former   • Substances: CBD   • Devices: Pre-filled or refillable cartridge   Substance and Sexual Activity   • Alcohol use: Yes     Comment: daily beer and/or liquor   • Drug use: No   • Sexual activity: Yes     Partners: Female       Medications:     Current Outpatient Medications:   •  Blood Glucose Monitoring Suppl w/Device kit, 1 kit 4 (Four) Times a Day Before Meals & at Bedtime., Disp: 1 each, Rfl: 0  •  Cetirizine HCl (ZYRTEC PO), Take 1 tablet by mouth Daily., Disp: , Rfl:   •  colchicine 0.6 MG tablet, Take 1 tablet by mouth Every 12 (Twelve) Hours., Disp: , Rfl:   •  COMBIGAN 0.2-0.5 % ophthalmic solution, instill 1 drop into both eyes twice a day, Disp: , Rfl: 0  •  DUPIXENT 300 MG/2ML solution prefilled syringe, , Disp: , Rfl:   •  EPINEPHrine (EPIPEN) 0.3 MG/0.3ML solution auto-injector injection, inject 1 PEN INJECTOR intramuscularly as directed, Disp: , Rfl: 0  •  febuxostat (Uloric) 80 MG tablet tablet, TAKE 1 TABLET BY MOUTH EVERY DAY, Disp: , Rfl:   •  glucose blood test strip, Use as instructed QID before meals and at bedtime, Disp: 100 each, Rfl: 3  •  Lancets (ACCU-CHEK MULTICLIX) lancets, Use as instructed QID before meals and at bedtime, Disp: 1 each, Rfl: 0  •  lisinopril (PRINIVIL,ZESTRIL) 20 MG tablet, TAKE 1 TABLET BY MOUTH DAILY, Disp: 30 tablet, Rfl: 5  •  Lumigan 0.01 % ophthalmic drops, Administer 1 drop to both eyes Every Night., Disp: , Rfl:   •  predniSONE (DELTASONE) 5 MG tablet, Take 5 mg by mouth As Needed., Disp: , Rfl:   •  TOBRADEX 0.3-0.1 % ophthalmic ointment, Administer 1 application to both eyes As Needed., Disp: , Rfl: 0  •  traZODone (DESYREL) 50 MG tablet, Take 1  "tablet by mouth Daily., Disp: , Rfl:   •  Xiidra 5 % ophthalmic solution, , Disp: , Rfl:   •  ALLEGRA-D ALLERGY & CONGESTION  MG per 12 hr tablet, Take 1 tablet by mouth Every Morning., Disp: , Rfl:   •  tamsulosin (FLOMAX) 0.4 MG capsule 24 hr capsule, Take 1 capsule by mouth Daily., Disp: 90 capsule, Rfl: 3    Allergies:   Allergies   Allergen Reactions   • Latex Rash   • Penicillins Rash       IPSS Questionnaire (AUA-7):  Did not complete     Sexual Health Inventory for Men (MARQUIS)   Did not complete     Objective     Physical Exam:   Vital Signs:   Vitals:    09/10/21 0857   BP: 132/72   Pulse: 60   Temp: 98.8 °F (37.1 °C)   TempSrc: Temporal   SpO2: 98%   Weight: 88.9 kg (196 lb)   Height: 165.1 cm (65\")   PainSc: 0-No pain     Body mass index is 32.62 kg/m².     Physical Exam  Vitals and nursing note reviewed.   Constitutional:       Appearance: Normal appearance.   HENT:      Head: Normocephalic and atraumatic.      Mouth/Throat:      Mouth: Mucous membranes are moist.      Pharynx: Oropharynx is clear.   Eyes:      Extraocular Movements: Extraocular movements intact.      Conjunctiva/sclera: Conjunctivae normal.   Cardiovascular:      Rate and Rhythm: Normal rate and regular rhythm.   Pulmonary:      Effort: Pulmonary effort is normal. No respiratory distress.   Abdominal:      Palpations: Abdomen is soft.      Tenderness: There is no abdominal tenderness. There is no right CVA tenderness or left CVA tenderness.   Genitourinary:     Comments:  Circumcised phallus, orthotopic meatus, bilaterally descended testicles without masses, or lesions.     ALLISON: Normal rectal tone, no blood, estimated 60 gram prostate without nodularity or firmness.   Musculoskeletal:         General: Normal range of motion.      Cervical back: Normal range of motion.      Comments: Small 3 cm lipoma palpable Right inner thigh/groin    Skin:     General: Skin is warm and dry.   Neurological:      General: No focal deficit present. "      Mental Status: He is alert and oriented to person, place, and time.   Psychiatric:         Mood and Affect: Mood normal.         Behavior: Behavior normal.         Labs:   Brief Urine Lab Results  (Last result in the past 365 days)      Color   Clarity   Blood   Leuk Est   Nitrite   Protein   CREAT   Urine HCG        09/10/21 0929 Yellow Clear Negative Negative Negative Negative                    Lab Results   Component Value Date    GLUCOSE 120 (H) 08/17/2020    CALCIUM 9.7 08/17/2020     08/17/2020    K 4.5 08/17/2020    CO2 26.4 08/17/2020     08/17/2020    BUN 13 08/17/2020    CREATININE 0.91 08/17/2020    EGFRIFNONA 85 08/17/2020    BCR 14.3 08/17/2020    ANIONGAP 8.6 08/17/2020       Lab Results   Component Value Date    WBC 6.12 08/17/2020    HGB 11.3 (L) 08/17/2020    HCT 34.5 (L) 08/17/2020    MCV 79.5 08/17/2020     08/17/2020       Images:   US Pelvis Limited    Result Date: 8/25/2021  Elliptical 4.6 cm avascular fatty structure suggesting a lipoma. No definite hernia visualized.  D:  08/25/2021 E:  08/25/2021    This report was finalized on 8/25/2021 5:03 PM by Dr. Mallory Booker MD.      US Scrotum & Testicles    Result Date: 8/25/2021  No evidence of an inguinal hernia. Bilateral hydroceles containing debris both large in size. No testicular mass or lesion.  D:  08/25/2021 E:  08/25/2021    D:  08/25/2021 E:  08/25/2021  This report was finalized on 8/25/2021 5:03 PM by Dr. Mallory Booker MD.        Measures:   Tobacco:   Kenn Hawthorne  reports that he has quit smoking. He has a 6.25 pack-year smoking history. He has never used smokeless tobacco.        Urine Incontinence: ( NOUI)  Patient reports that he is not currently experiencing any symptoms of urinary incontinence.    Assessment / Plan      Assessment/Plan:   Kenn Hawthorne is a 61 y.o. male who presented today for evaluation of bilateral hydroceles and lower urinary tract symptoms including urinary urgency,  frequency, nocturia, weak stream. In regards to his hydroceles, I discussed his exam is normal, his testicles are palpable, he has mild to moderate amount of fluid around both testicles which is corroborated with the scrotal ultrasound. I described at surgery no removal of this fluid via hydrocelectomy is only indicated when patients have bothersome symptoms. Given he is asymptomatic I recommend doing nothing about this and monitoring, we discussed if his hydroceles become much larger or bothersome then we can always readdress surgical interventions.    Patient has moderate lower urinary tract symptoms, he did not complete his IPSS survey score today. He complains of urgency, frequency, weak stream and nocturia which is improved with fluid restriction at night. We discussed first-line medical therapy which would include tamsulosin 0.4 mg daily. Patient has an enlarged prostate on digital rectal examination today, estimated greater than 50 g. We'll start empirically on Flomax and reassess how he is doing in 3 months follow-up.    Patient has had PSA screening, had a long discussion with the patient about continued PSA screening, high AUA guidelines suggest screening men ages 55-70. We discussed how -American men are at higher risk of more aggressive prostate cancers and that I recommend screening in this population. He has undergone prior PSA in 2019. I will reorder a PSA to be completed prior to his 3-month follow-up and patient is amenable to this. He has no concerns on his digital rectal examination today.    Diagnoses and all orders for this visit:    1. Hydrocele in adult (Primary)    Discuss hydrocelectomy, discussed the surgical procedure in detail and how we perform this for bothersome symptoms. He has moderate hydroceles bilaterally, given he is asymptomatic we will observe these for now.    -     POC Urinalysis Dipstick, Automated    2. Benign localized prostatic hyperplasia with lower urinary tract  symptoms (LUTS)    -     tamsulosin (FLOMAX) 0.4 MG capsule 24 hr capsule; Take 1 capsule by mouth Daily.  Dispense: 90 capsule; Refill: 3    3. Prostate cancer screening    - We'll repeat PSA, last PSA was checked in 2019. He will have repeat PSA prior to seeing me back in December.        Follow Up:   Return in about 3 months (around 12/10/2021).    I spent approximately 45 minutes providing clinical care for this patient; including review of patient's chart and provider documentation, face to face time spent with patient in examination room (obtaining history, performing physical exam, discussing diagnosis and management options), placing orders, and completing patient documentation.     Casey Mcarthur MD  [facility] [place]

## 2021-09-27 DIAGNOSIS — I10 ESSENTIAL HYPERTENSION: ICD-10-CM

## 2021-09-27 RX ORDER — LISINOPRIL 20 MG/1
20 TABLET ORAL DAILY
Qty: 30 TABLET | Refills: 0 | Status: SHIPPED | OUTPATIENT
Start: 2021-09-27 | End: 2021-11-08

## 2021-09-27 RX ORDER — LISINOPRIL 20 MG/1
20 TABLET ORAL DAILY
Qty: 90 TABLET | OUTPATIENT
Start: 2021-09-27

## 2021-11-08 DIAGNOSIS — I10 ESSENTIAL HYPERTENSION: ICD-10-CM

## 2021-11-08 RX ORDER — LISINOPRIL 20 MG/1
20 TABLET ORAL DAILY
Qty: 90 TABLET | OUTPATIENT
Start: 2021-11-08

## 2021-11-08 RX ORDER — LISINOPRIL 20 MG/1
20 TABLET ORAL DAILY
Qty: 30 TABLET | Refills: 0 | Status: SHIPPED | OUTPATIENT
Start: 2021-11-08 | End: 2021-11-24 | Stop reason: SDUPTHER

## 2021-11-24 ENCOUNTER — OFFICE VISIT (OUTPATIENT)
Dept: INTERNAL MEDICINE | Facility: CLINIC | Age: 61
End: 2021-11-24

## 2021-11-24 VITALS
WEIGHT: 175 LBS | BODY MASS INDEX: 29.16 KG/M2 | OXYGEN SATURATION: 97 % | SYSTOLIC BLOOD PRESSURE: 106 MMHG | HEART RATE: 69 BPM | TEMPERATURE: 98.2 F | HEIGHT: 65 IN | DIASTOLIC BLOOD PRESSURE: 60 MMHG

## 2021-11-24 DIAGNOSIS — E11.59 TYPE 2 DIABETES MELLITUS WITH OTHER CIRCULATORY COMPLICATION, WITHOUT LONG-TERM CURRENT USE OF INSULIN (HCC): ICD-10-CM

## 2021-11-24 DIAGNOSIS — Z23 NEED FOR INFLUENZA VACCINATION: ICD-10-CM

## 2021-11-24 DIAGNOSIS — Z00.00 PHYSICAL EXAM: Primary | ICD-10-CM

## 2021-11-24 DIAGNOSIS — I10 ESSENTIAL HYPERTENSION: ICD-10-CM

## 2021-11-24 DIAGNOSIS — Z12.11 COLON CANCER SCREENING: ICD-10-CM

## 2021-11-24 DIAGNOSIS — N40.1 BENIGN LOCALIZED PROSTATIC HYPERPLASIA WITH LOWER URINARY TRACT SYMPTOMS (LUTS): ICD-10-CM

## 2021-11-24 LAB
EXPIRATION DATE: ABNORMAL
HBA1C MFR BLD: 7.4 %
Lab: ABNORMAL

## 2021-11-24 PROCEDURE — 90471 IMMUNIZATION ADMIN: CPT

## 2021-11-24 PROCEDURE — 90686 IIV4 VACC NO PRSV 0.5 ML IM: CPT

## 2021-11-24 PROCEDURE — 90732 PPSV23 VACC 2 YRS+ SUBQ/IM: CPT

## 2021-11-24 PROCEDURE — 99396 PREV VISIT EST AGE 40-64: CPT

## 2021-11-24 PROCEDURE — 99214 OFFICE O/P EST MOD 30 MIN: CPT

## 2021-11-24 PROCEDURE — 83036 HEMOGLOBIN GLYCOSYLATED A1C: CPT

## 2021-11-24 PROCEDURE — 90472 IMMUNIZATION ADMIN EACH ADD: CPT

## 2021-11-24 RX ORDER — LISINOPRIL 20 MG/1
20 TABLET ORAL DAILY
Qty: 90 TABLET | Refills: 1 | Status: SHIPPED | OUTPATIENT
Start: 2021-11-24 | End: 2021-12-21

## 2021-11-24 RX ORDER — TAMSULOSIN HYDROCHLORIDE 0.4 MG/1
1 CAPSULE ORAL DAILY
COMMUNITY
End: 2021-11-24

## 2021-11-24 RX ORDER — METFORMIN HYDROCHLORIDE 500 MG/1
500 TABLET, EXTENDED RELEASE ORAL
Qty: 90 TABLET | Refills: 1 | Status: SHIPPED | OUTPATIENT
Start: 2021-11-24 | End: 2022-06-04 | Stop reason: DRUGHIGH

## 2021-11-24 RX ORDER — TAMSULOSIN HYDROCHLORIDE 0.4 MG/1
1 CAPSULE ORAL DAILY
Qty: 90 CAPSULE | Refills: 3 | Status: SHIPPED | OUTPATIENT
Start: 2021-11-24 | End: 2022-12-19 | Stop reason: SDUPTHER

## 2021-11-24 NOTE — PROGRESS NOTES
"Chief Complaint  Annual Exam    Kenn Hawthorne presents to [facility]    Gout- Following with Dr. Sanz for recurrent gout. Weaning off of prednisone and is taking febuxostat each day. Reports not having a gout flare in over 7 months. Has been able to increase his physical activity and is running 2 miles per day 3 x week.     HTN- Taking lisinopril each day with no omitted doses and no side effects reported. Monitoring blood pressure at home with systolic's in the 120's and diastolic's in the 70's. Monitoring sodium in the diet. Has been eating more grilled chicken and grilled vegetables. Currently asymptomatic.     DM- Has been using frequent prednisone tapers over the past few months for gout and eczema. Was dx with DM in 2020 and managed with lifestyle. Follow-up A1c was 5.5%. A1c today is 7.4%. Currently asymptomatic.     BPH- Following with Dr. Mcarthur with urology for management of BPH. Reports good symptom control with Flomax.     The patient is being seen for a health maintenance evaluation.  The last health maintenance was 1 year(s) ago.    Social History  Kenn  does not smoke cigarettes.   He drinks occasional alcohol. Drinks 3-4 drinks per week.   He does not use illicit drugs.    General History  Kenn does have regular dental visits.  He does not complain of vision problems. Last eye exam was 2020. Has an appointment 12/21.   Immunizations are not up to date. The patient needs the following immunizations: Influenza and PPSV23.     Lifestyle  Kenn  consumes a in general, a \"healthy\" diet  .  He exercises daily.    Reproductive Health  Kenn  is sexually active.   He does not have erectile dysfunction.     Screening  Last PSA was 2021.   Last prostate exam was 2021 by Casey Mcarthur MD. Family history of prostate cancer: No  Last testicular exam was self-performed monthly.   Last colonoscopy was 2011 by Dr. Hernandez. Family history of colon cancer: Mother "  at 58.  of colon cancer.  Other pertinent family history and/or screenings:     Health Maintenance -  10-year risk of heart disease or stroke using the ASCVD algorithm = Will calculate with updated lipid panel.   Wearing seatbelt: yes/no  Smoke detectors in home: yes/no    Patient denies fever, chills, headache, ear pain, sore throat, shortness of air, cough, wheezing, chest pain, palpitations, abdominal pain, nausea, vomiting, diarrhea, dysuria, blood in stool or urine. Mood is stable. Eating and drinking as usual. No unexplained weight loss or gain.       Subjective         Health Maintenance   Topic   • URINE MICROALBUMIN    • COLORECTAL CANCER SCREENING    • COVID-19 Vaccine (3 - Booster for Pfizer series)   • TDAP/TD VACCINES (1 - Tdap)   • DIABETIC EYE EXAM    • ZOSTER VACCINE (1 of 2)   • HEMOGLOBIN A1C    • DIABETIC FOOT EXAM    • ANNUAL PHYSICAL    • Pneumococcal Vaccine 0-64 (2 of 2 - PPSV23)   • HEPATITIS C SCREENING    • INFLUENZA VACCINE        [facility]  The following portions of the patient's history were reviewed and updated as appropriate: allergies, current medications, past family history, past medical history, past social history, past surgical history and problem list.     Past Medical History:   Diagnosis Date   • Eczema    • Glaucoma, left eye    • Hypertension       Allergies   Allergen Reactions   • Latex Rash   • Penicillins Rash      Social History     Tobacco Use   • Smoking status: Former Smoker     Packs/day: 0.25     Years: 25.00     Pack years: 6.25   • Smokeless tobacco: Never Used   Vaping Use   • Vaping Use: Former   • Substances: CBD   • Devices: Pre-filled or refillable cartridge   Substance Use Topics   • Alcohol use: Yes     Comment: daily beer and/or liquor   • Drug use: No     Past Surgical History:   Procedure Laterality Date   • BICEPS TENDON REPAIR        Family History   Problem Relation Age of Onset   • Cancer Mother         Colon   • Stroke Father           Current Outpatient Medications:   •  Blood Glucose Monitoring Suppl w/Device kit, 1 kit 4 (Four) Times a Day Before Meals & at Bedtime., Disp: 1 each, Rfl: 0  •  Cetirizine HCl (ZYRTEC PO), Take 1 tablet by mouth Daily., Disp: , Rfl:   •  colchicine 0.6 MG tablet, Take 1 tablet by mouth Every 12 (Twelve) Hours., Disp: , Rfl:   •  COMBIGAN 0.2-0.5 % ophthalmic solution, instill 1 drop into both eyes twice a day, Disp: , Rfl: 0  •  EPINEPHrine (EPIPEN) 0.3 MG/0.3ML solution auto-injector injection, inject 1 PEN INJECTOR intramuscularly as directed, Disp: , Rfl: 0  •  febuxostat (Uloric) 80 MG tablet tablet, TAKE 1 TABLET BY MOUTH EVERY DAY, Disp: , Rfl:   •  glucose blood test strip, Use as instructed QID before meals and at bedtime, Disp: 100 each, Rfl: 3  •  Lancets (ACCU-CHEK MULTICLIX) lancets, Use as instructed QID before meals and at bedtime, Disp: 1 each, Rfl: 0  •  lisinopril (PRINIVIL,ZESTRIL) 20 MG tablet, Take 1 tablet by mouth Daily., Disp: 90 tablet, Rfl: 1  •  Lumigan 0.01 % ophthalmic drops, Administer 1 drop to both eyes Every Night., Disp: , Rfl:   •  predniSONE (DELTASONE) 5 MG tablet, Take 5 mg by mouth As Needed., Disp: , Rfl:   •  tamsulosin (FLOMAX) 0.4 MG capsule 24 hr capsule, Take 1 capsule by mouth Daily., Disp: 90 capsule, Rfl: 3  •  TOBRADEX 0.3-0.1 % ophthalmic ointment, Administer 1 application to both eyes As Needed., Disp: , Rfl: 0  •  Xiidra 5 % ophthalmic solution, , Disp: , Rfl:   •  metFORMIN ER (Glucophage XR) 500 MG 24 hr tablet, Take 1 tablet by mouth Daily With Breakfast., Disp: 90 tablet, Rfl: 1    Review of Systems   Constitutional: Negative for appetite change, chills, diaphoresis, fatigue, fever and unexpected weight loss.   HENT: Negative for nosebleeds, rhinorrhea, sinus pressure, sore throat, swollen glands, trouble swallowing and voice change.    Eyes: Negative for blurred vision, double vision, photophobia and visual disturbance.   Respiratory: Negative for  "apnea, cough, chest tightness, shortness of breath and wheezing.    Cardiovascular: Negative for chest pain, palpitations and leg swelling.   Gastrointestinal: Negative for abdominal pain, blood in stool, constipation, diarrhea, nausea, vomiting and GERD.   Genitourinary: Negative for decreased urine volume, difficulty urinating, dysuria and flank pain.   Musculoskeletal: Negative for arthralgias and myalgias.   Skin: Negative for color change, rash and skin lesions.   Neurological: Negative for dizziness, syncope, facial asymmetry, speech difficulty, weakness, light-headedness and headache.   Psychiatric/Behavioral: Negative for behavioral problems, dysphoric mood, hallucinations, self-injury, sleep disturbance, suicidal ideas and depressed mood. The patient is not nervous/anxious.        Objective   Vital Signs  /60   Pulse 69   Temp 98.2 °F (36.8 °C)   Ht 165.1 cm (65\")   Wt 79.4 kg (175 lb)   SpO2 97%   BMI 29.12 kg/m²     Physical Exam  Constitutional:       General: He is not in acute distress.     Appearance: Normal appearance. He is normal weight. He is not ill-appearing or toxic-appearing.   HENT:      Head: Normocephalic.      Right Ear: Hearing, tympanic membrane, ear canal and external ear normal.      Left Ear: Hearing, tympanic membrane, ear canal and external ear normal.      Nose: Nose normal. No congestion or rhinorrhea.      Mouth/Throat:      Mouth: Mucous membranes are moist.      Pharynx: Oropharynx is clear. No oropharyngeal exudate or posterior oropharyngeal erythema.   Eyes:      General: Lids are normal. No allergic shiner, visual field deficit or scleral icterus.     Extraocular Movements: Extraocular movements intact.      Right eye: No nystagmus.      Left eye: No nystagmus.      Conjunctiva/sclera:      Right eye: Right conjunctiva is injected.      Left eye: Left conjunctiva is injected.      Pupils: Pupils are equal, round, and reactive to light.   Neck:      Thyroid: No " thyroid mass, thyromegaly or thyroid tenderness.      Vascular: No carotid bruit.      Trachea: Trachea and phonation normal.   Cardiovascular:      Rate and Rhythm: Normal rate and regular rhythm.      Chest Wall: PMI is not displaced. No thrill.      Pulses: Normal pulses.           Radial pulses are 2+ on the right side and 2+ on the left side.        Dorsalis pedis pulses are 2+ on the right side and 2+ on the left side.        Posterior tibial pulses are 2+ on the right side and 2+ on the left side.      Heart sounds: No murmur heard.  No gallop. No S3 sounds.    Pulmonary:      Effort: Pulmonary effort is normal.      Breath sounds: Normal breath sounds.   Chest:   Breasts:      Right: No axillary adenopathy or supraclavicular adenopathy.      Left: No axillary adenopathy or supraclavicular adenopathy.       Abdominal:      General: Abdomen is flat. Bowel sounds are normal.      Palpations: Abdomen is soft.      Tenderness: There is no abdominal tenderness. There is no guarding or rebound.      Hernia: No hernia is present.   Musculoskeletal:         General: Normal range of motion.      Cervical back: Normal range of motion and neck supple. No rigidity.      Right lower leg: No edema.      Left lower leg: No edema.      Right foot: Normal range of motion. No deformity, bunion, Charcot foot, foot drop or prominent metatarsal heads.      Left foot: Normal range of motion. No deformity, bunion, Charcot foot, foot drop or prominent metatarsal heads.   Feet:      Right foot:      Protective Sensation: 9 sites tested. 9 sites sensed.      Skin integrity: Skin integrity normal.      Toenail Condition: Right toenails are normal.      Left foot:      Protective Sensation: 9 sites tested. 9 sites sensed.      Skin integrity: Skin integrity normal.      Toenail Condition: Left toenails are normal.   Lymphadenopathy:      Head:      Right side of head: No submental, submandibular, tonsillar, preauricular, posterior  auricular or occipital adenopathy.      Left side of head: No submental, submandibular, tonsillar, preauricular, posterior auricular or occipital adenopathy.      Cervical: No cervical adenopathy.      Upper Body:      Right upper body: No supraclavicular, axillary, pectoral or epitrochlear adenopathy.      Left upper body: No supraclavicular, axillary, pectoral or epitrochlear adenopathy.   Skin:     General: Skin is warm and dry.      Capillary Refill: Capillary refill takes less than 2 seconds.      Findings: No abrasion.      Nails: There is no clubbing.   Neurological:      General: No focal deficit present.      Mental Status: He is alert and oriented to person, place, and time.      GCS: GCS eye subscore is 4. GCS verbal subscore is 5. GCS motor subscore is 6.      Cranial Nerves: Cranial nerves are intact.      Motor: Motor function is intact.      Coordination: Coordination is intact.      Deep Tendon Reflexes: Reflexes are normal and symmetric.   Psychiatric:         Attention and Perception: Attention and perception normal.         Mood and Affect: Mood and affect normal.         Speech: Speech normal.         Behavior: Behavior normal. Behavior is cooperative.         Thought Content: Thought content normal.         Cognition and Memory: Cognition and memory normal.         Judgment: Judgment normal.          Result Review :     The following data was reviewed by: TIANNA Parker on 11/24/2021:  A1C Last 3 Results    HGBA1C Last 3 Results 11/24/21   Hemoglobin A1C 7.4             Assessment and Plan    1. Physical exam  - CBC & Differential; Future  - Comprehensive Metabolic Panel; Future  - TSH Rfx On Abnormal To Free T4; Future  - Lipid Panel; Future  - Will review labs and address any abnormalities.   - Nutrition and activity goals reviewed including: mainly water to drink, limit white flour/processed sugar; increase high protein, high fiber carbs, good breakfast, working toward 150 mins cardio per  week, resistance training 2x/week.    The patient is here for a health maintenance visit.  Screening lab work is ordered.  Immunizations are reported as current.  Advice and education is given regarding nutrition, aerobic exercise, routine dental evaluations, routine eye exams, reproductive health, cardiovascular risk reduction.  Further recommendations after lab evaluation.  Annual wellness evaluations recommended.     I discussed the patients findings and my recommendations with patient.  Patient was encouraged to keep me informed of any acute changes or any new concerning symptoms.  Patient voiced understanding of all instructions and denied further questions.    2. Essential hypertension  - lisinopril (PRINIVIL,ZESTRIL) 20 MG tablet; Take 1 tablet by mouth Daily.  Dispense: 90 tablet; Refill: 1  - Controlled. Continue on current dose of lisinopril.     3. Colon cancer screening  - Ambulatory Referral For Screening Colonoscopy    4. Type 2 diabetes mellitus with other circulatory complication, without long-term current use of insulin (HCC)  - Microalbumin / Creatinine Urine Ratio - Urine, Clean Catch; Future  - metFORMIN ER (Glucophage XR) 500 MG 24 hr tablet; Take 1 tablet by mouth Daily With Breakfast.  Dispense: 90 tablet; Refill: 1  - POC Glycosylated Hemoglobin (Hb A1C)  - Uncontrolled as evidenced by A1c of 7.4%. Likely precipitated by frequent steroid use. Initiate metformin as prescribed.   - Encouraged to monitor blood glucose at home obtaining a combination of both fasting blood glucoses (upon awakening/before eating) and post-prandial blood glucoses (2 hours after beginning of meals). FBGs should be between  and post-prandial blood glucoses should be less than 180 to ensure that A1C is less than 7%.   - Encouraged to consume a diet low in saturated fat (13 g max), refined carbohydrates, and fructose corn syrup. Consume a diet high in fiber (30g/day), lean protein, and fresh produce intake.   -  Encouraged to increase physical activity to include 150 mins per week (30 mins per day 5 days per week) of moderate intensity aerobic activity and resistance training 2-3 x per week. Attempt to lose weight to achieve an ideal BMI of 18.5-24.9.   UTD with annual eye exam(Scheduled), urine microalbumin (ordered), and diabetic foot exam (performed today)    Goals for you:   • A1C less than 7%  • FPG should be   • PP should be less than 180  • BP should be less than 130/80  • LDL less than 100  • Albumin/creatinine ratio less than 30 mg/g  • Abstain from smoking and alcohol use  • Annual dilated eye exam  • Annual diabetic foot exam     Education performed during this visit includes long term diabetic complications and routine health maintenance including annual eye exams with ophthalmology, appropriate dental hygiene, and foot care. Medications, including side effects and compliance discussed carefully and Hypoglycemia management and prevention reviewed.   Discussed the nature of the disease including, risks, complications, implications, management, safe and proper use of medications. Encouraged therapeutic lifestyle changes including low calorie diet with plenty of fruits and vegetables, daily exercise, medication compliance, and keeping scheduled follow up appointments as indicated. Encouraged patient to have appointment for complete physical, fasting labs, appropriate screenings, and immunizations on an annual basis.    5. Benign localized prostatic hyperplasia with lower urinary tract symptoms (LUTS)  - tamsulosin (FLOMAX) 0.4 MG capsule 24 hr capsule; Take 1 capsule by mouth Daily.  Dispense: 90 capsule; Refill: 3    6. Need for influenza vaccination  - FluLaval/Fluarix/Fluzone >6 Months      Follow Up     Return in about 1 month (around 12/24/2021) for HTN, DM.    Patient was given instructions and counseling regarding his condition or for health maintenance advice. Please see specific information pulled  into the AVS if appropriate.    Part of this note may be an electronic transcription/translation of spoken language to printed text using the Dragon Dictation System.    Electronically signed by:   TIANNA Parker  11/24/2021

## 2021-12-14 DIAGNOSIS — Z12.11 ENCOUNTER FOR SCREENING COLONOSCOPY: Primary | ICD-10-CM

## 2021-12-21 DIAGNOSIS — I10 ESSENTIAL HYPERTENSION: ICD-10-CM

## 2021-12-21 RX ORDER — LISINOPRIL 20 MG/1
20 TABLET ORAL DAILY
Qty: 30 TABLET | Refills: 0 | Status: SHIPPED | OUTPATIENT
Start: 2021-12-21 | End: 2022-06-04 | Stop reason: SDUPTHER

## 2022-01-19 ENCOUNTER — TELEPHONE (OUTPATIENT)
Dept: INTERNAL MEDICINE | Facility: CLINIC | Age: 62
End: 2022-01-19

## 2022-01-19 DIAGNOSIS — E11.59 TYPE 2 DIABETES MELLITUS WITH OTHER CIRCULATORY COMPLICATION, WITHOUT LONG-TERM CURRENT USE OF INSULIN: ICD-10-CM

## 2022-01-19 NOTE — TELEPHONE ENCOUNTER
Caller: Kenn Hawthorne    Relationship: Self    Best call back number: [phone]    Requested Prescriptions:   Requested Prescriptions     Pending Prescriptions Disp Refills   • glucose blood test strip 100 each 3     Sig: Use as instructed QID before meals and at bedtime        Pharmacy where request should be sent: [id number] [address]  AT [address] - [phone] [facility] [phone] FX     Additional details provided by patient:COMPLETELY OUT OF TEST STRIPS    Does the patient have less than a 3 day supply:  [x] Yes  [] No    Khloe Lovell, [employer] Rep   01/19/22 11:43 EST

## 2022-01-24 DIAGNOSIS — Z12.11 ENCOUNTER FOR SCREENING COLONOSCOPY: ICD-10-CM

## 2022-02-21 ENCOUNTER — OUTSIDE FACILITY SERVICE (OUTPATIENT)
Dept: GASTROENTEROLOGY | Facility: CLINIC | Age: 62
End: 2022-02-21

## 2022-02-21 DIAGNOSIS — K62.89 RECTAL MASS: Primary | ICD-10-CM

## 2022-02-21 PROCEDURE — 88360 TUMOR IMMUNOHISTOCHEM/MANUAL: CPT | Performed by: INTERNAL MEDICINE

## 2022-02-21 PROCEDURE — 88341 IMHCHEM/IMCYTCHM EA ADD ANTB: CPT | Performed by: INTERNAL MEDICINE

## 2022-02-21 PROCEDURE — 45380 COLONOSCOPY AND BIOPSY: CPT | Performed by: INTERNAL MEDICINE

## 2022-02-21 PROCEDURE — 45385 COLONOSCOPY W/LESION REMOVAL: CPT | Performed by: INTERNAL MEDICINE

## 2022-02-21 PROCEDURE — 88305 TISSUE EXAM BY PATHOLOGIST: CPT | Performed by: INTERNAL MEDICINE

## 2022-02-21 PROCEDURE — 88342 IMHCHEM/IMCYTCHM 1ST ANTB: CPT | Performed by: INTERNAL MEDICINE

## 2022-02-22 ENCOUNTER — LAB REQUISITION (OUTPATIENT)
Dept: LAB | Facility: HOSPITAL | Age: 62
End: 2022-02-22

## 2022-02-22 DIAGNOSIS — Z80.0 FAMILY HISTORY OF MALIGNANT NEOPLASM OF DIGESTIVE ORGANS: ICD-10-CM

## 2022-02-22 DIAGNOSIS — Z86.010 PERSONAL HISTORY OF COLONIC POLYPS: ICD-10-CM

## 2022-02-22 DIAGNOSIS — K64.8 OTHER HEMORRHOIDS: ICD-10-CM

## 2022-02-22 DIAGNOSIS — Z12.11 ENCOUNTER FOR SCREENING FOR MALIGNANT NEOPLASM OF COLON: ICD-10-CM

## 2022-02-23 ENCOUNTER — HOSPITAL ENCOUNTER (OUTPATIENT)
Dept: CT IMAGING | Facility: HOSPITAL | Age: 62
End: 2022-02-23

## 2022-02-23 ENCOUNTER — APPOINTMENT (OUTPATIENT)
Dept: CT IMAGING | Facility: HOSPITAL | Age: 62
End: 2022-02-23

## 2022-02-23 ENCOUNTER — TELEPHONE (OUTPATIENT)
Dept: GASTROENTEROLOGY | Facility: CLINIC | Age: 62
End: 2022-02-23

## 2022-02-23 NOTE — TELEPHONE ENCOUNTER
PATIENT WAS SCHEDULED FOR CT TODAY BUT HE WAS UNABLE TO MAKE IT.     HE RESCHEDULED IT TO MARCH 9TH.     DR HODGE OFFICE IS AWARE AND IS GOING TO MOVE HIS APPT BACK TO 03.15.22    CALLED DR HILL OFFICE AND  FOR SUSY TO MOVE DR HILL APPT TO AFTER 03.15.22.     JUST WANTED TO MAKE YOU AWARE.

## 2022-03-09 ENCOUNTER — HOSPITAL ENCOUNTER (OUTPATIENT)
Dept: CT IMAGING | Facility: HOSPITAL | Age: 62
Discharge: HOME | End: 2022-03-09
Admitting: INTERNAL MEDICINE

## 2022-03-09 DIAGNOSIS — K62.89 RECTAL MASS: ICD-10-CM

## 2022-03-09 LAB — CREAT BLDA-MCNC: 0.9 MG/DL (ref 0.6–1.3)

## 2022-03-09 PROCEDURE — 25010000002 IOPAMIDOL 61 % SOLUTION: Performed by: INTERNAL MEDICINE

## 2022-03-09 PROCEDURE — 82565 ASSAY OF CREATININE: CPT

## 2022-03-09 PROCEDURE — 71260 CT THORAX DX C+: CPT

## 2022-03-09 PROCEDURE — 74177 CT ABD & PELVIS W/CONTRAST: CPT

## 2022-03-09 RX ADMIN — IOPAMIDOL 100 ML: 612 INJECTION, SOLUTION INTRAVENOUS at 15:30

## 2022-03-10 ENCOUNTER — CONSULT (OUTPATIENT)
Dept: ONCOLOGY | Facility: CLINIC | Age: 62
End: 2022-03-10

## 2022-03-10 ENCOUNTER — LAB (OUTPATIENT)
Dept: LAB | Facility: HOSPITAL | Age: 62
End: 2022-03-10

## 2022-03-10 VITALS
RESPIRATION RATE: 16 BRPM | WEIGHT: 176 LBS | BODY MASS INDEX: 29.32 KG/M2 | HEART RATE: 57 BPM | OXYGEN SATURATION: 100 % | HEIGHT: 65 IN | DIASTOLIC BLOOD PRESSURE: 92 MMHG | TEMPERATURE: 97.6 F | SYSTOLIC BLOOD PRESSURE: 151 MMHG

## 2022-03-10 DIAGNOSIS — C7B.8 NEUROENDOCRINE CARCINOMA METASTATIC TO MULTIPLE SITES: Primary | ICD-10-CM

## 2022-03-10 DIAGNOSIS — C7B.8 NEUROENDOCRINE CARCINOMA METASTATIC TO MULTIPLE SITES: Primary | Chronic | ICD-10-CM

## 2022-03-10 DIAGNOSIS — C7B.8 NEUROENDOCRINE CARCINOMA METASTATIC TO MULTIPLE SITES: ICD-10-CM

## 2022-03-10 DIAGNOSIS — C7A.8 NEUROENDOCRINE CARCINOMA METASTATIC TO MULTIPLE SITES: Primary | Chronic | ICD-10-CM

## 2022-03-10 DIAGNOSIS — C7A.8 NEUROENDOCRINE CARCINOMA METASTATIC TO MULTIPLE SITES: ICD-10-CM

## 2022-03-10 DIAGNOSIS — C7A.8 NEUROENDOCRINE CARCINOMA METASTATIC TO MULTIPLE SITES: Primary | ICD-10-CM

## 2022-03-10 LAB
ALBUMIN SERPL-MCNC: 4.5 G/DL (ref 3.5–5.2)
ALBUMIN/GLOB SERPL: 1.7 G/DL
ALP SERPL-CCNC: 107 U/L (ref 39–117)
ALT SERPL W P-5'-P-CCNC: 35 U/L (ref 1–41)
ANION GAP SERPL CALCULATED.3IONS-SCNC: 10 MMOL/L (ref 5–15)
AST SERPL-CCNC: 25 U/L (ref 1–40)
BILIRUB SERPL-MCNC: 0.6 MG/DL (ref 0–1.2)
BUN SERPL-MCNC: 13 MG/DL (ref 8–23)
BUN/CREAT SERPL: 13.3 (ref 7–25)
CALCIUM SPEC-SCNC: 9.7 MG/DL (ref 8.6–10.5)
CHLORIDE SERPL-SCNC: 103 MMOL/L (ref 98–107)
CO2 SERPL-SCNC: 29 MMOL/L (ref 22–29)
CREAT SERPL-MCNC: 0.98 MG/DL (ref 0.76–1.27)
EGFRCR SERPLBLD CKD-EPI 2021: 87.7 ML/MIN/1.73
ERYTHROCYTE [DISTWIDTH] IN BLOOD BY AUTOMATED COUNT: 14.4 % (ref 12.3–15.4)
GLOBULIN UR ELPH-MCNC: 2.7 GM/DL
GLUCOSE SERPL-MCNC: 104 MG/DL (ref 65–99)
HCT VFR BLD AUTO: 38 % (ref 37.5–51)
HGB BLD-MCNC: 12.4 G/DL (ref 13–17.7)
LYMPHOCYTES # BLD AUTO: 1.5 10*3/MM3 (ref 0.7–3.1)
LYMPHOCYTES NFR BLD AUTO: 29.7 % (ref 19.6–45.3)
MCH RBC QN AUTO: 27.3 PG (ref 26.6–33)
MCHC RBC AUTO-ENTMCNC: 32.5 G/DL (ref 31.5–35.7)
MCV RBC AUTO: 83.9 FL (ref 79–97)
MONOCYTES # BLD AUTO: 0.2 10*3/MM3 (ref 0.1–0.9)
MONOCYTES NFR BLD AUTO: 3.9 % (ref 5–12)
NEUTROPHILS NFR BLD AUTO: 3.5 10*3/MM3 (ref 1.7–7)
NEUTROPHILS NFR BLD AUTO: 66.4 % (ref 42.7–76)
PLATELET # BLD AUTO: 237 10*3/MM3 (ref 140–450)
PMV BLD AUTO: 8 FL (ref 6–12)
POTASSIUM SERPL-SCNC: 3.9 MMOL/L (ref 3.5–5.2)
PROT SERPL-MCNC: 7.2 G/DL (ref 6–8.5)
RBC # BLD AUTO: 4.53 10*6/MM3 (ref 4.14–5.8)
SODIUM SERPL-SCNC: 142 MMOL/L (ref 136–145)
WBC NRBC COR # BLD: 5.2 10*3/MM3 (ref 3.4–10.8)

## 2022-03-10 PROCEDURE — 99205 OFFICE O/P NEW HI 60 MIN: CPT | Performed by: INTERNAL MEDICINE

## 2022-03-10 PROCEDURE — 85025 COMPLETE CBC W/AUTO DIFF WBC: CPT

## 2022-03-10 PROCEDURE — 80053 COMPREHEN METABOLIC PANEL: CPT

## 2022-03-10 PROCEDURE — 36415 COLL VENOUS BLD VENIPUNCTURE: CPT

## 2022-03-10 PROCEDURE — 86316 IMMUNOASSAY TUMOR OTHER: CPT

## 2022-03-10 RX ORDER — ERGOCALCIFEROL 1.25 MG/1
CAPSULE ORAL
COMMUNITY
Start: 2021-12-08 | End: 2022-06-04 | Stop reason: DRUGHIGH

## 2022-03-10 RX ORDER — FEXOFENADINE HYDROCHLORIDE 180 MG/1
180 TABLET, FILM COATED ORAL DAILY
COMMUNITY
Start: 2022-01-18

## 2022-03-10 NOTE — PROGRESS NOTES
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 Coats were reviewed and summarized in detail.    Oncology/Hematology History Overview Note   1.  Well differentiated neuroendocrine grade 2 tumor on rectal mass biopsy by Dr. Coats 2/21/2022 with hyperplastic rectal polyp negative for dysplasia.  3/9/2022 CT chest abdomen pelvis with contrast shows no intrathoracic metastases.  Multiple low-density masses within the liver consistent with metastases the largest 4.1 cm.  An additional lateral segment of the left lobe of the liver measures 3.8 cm.  Rectal tumor measures 3.4 cm with adjacent lymphadenopathy.  Compared to CT abdomen February 2020 there are new sclerotic bone lesions L1 and L3 with mixed lytic and sclerotic lesions of T10.  2.  Diabetes  3.  Hypertension  4.  Hydrocele  5.  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 Sanz.  On 5 mg/day prednisone.  6.  Gout.  Allopurinol stopped due to renal dysfunction.  Colchicine available.  7.  Creatinine 1.7 on 7/7/2020.  Down to 0.9 by 3/9/22    Oncology history timeline:  -3/10/2022 [facility] medical oncology initial consultation: He went for routine cancer screening in light of a mother who passed of colon cancer at age 52.  His last colonoscopy was over 10 years ago.  No change in color caliber or consistency of his stools.  Ran 3 miles today and feels great.  No somatic complaints whatsoever.  Recent colonoscopy with Dr. Coats showed well differentiated neuroendocrine grade 2 tumor on rectal mass biopsy by Dr. Coats 2/21/2022 with hyperplastic rectal polyp negative for dysplasia.  3/9/2022 CT chest abdomen pelvis with contrast shows no intrathoracic metastases.  Multiple low-density masses within the liver consistent with metastases the  largest 4.1 cm.  An additional lateral segment of the left lobe of the liver measures 3.8 cm.  Rectal tumor measures 3.4 cm with adjacent lymphadenopathy.  Compared to CT abdomen February 2020 there are new sclerotic bone lesions L1 and L3 with mixed lytic and sclerotic lesions of T10.     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  male from [facility], referred for well differentiated neuroendocrine carcinoma grade 2 of the rectum with CT evidence of bone and liver metastases without symptoms from any of this    REVIEW OF SYSTEMS:  No change in the color caliber or consistency of stools.  No carcinoid symptoms.    Past Medical History:   Diagnosis Date   • Eczema    • Glaucoma, left eye    • Hypertension      Past Surgical History:   Procedure Laterality Date   • BICEPS TENDON REPAIR         Current Outpatient Medications on File Prior to Visit   Medication Sig Dispense Refill   • Allergy Relief 180 MG tablet Take 180 mg by mouth Daily.     • Blood Glucose Monitoring Suppl w/Device kit 1 kit 4 (Four) Times a Day Before Meals & at Bedtime. 1 each 0   • colchicine 0.6 MG tablet Take 1 tablet by mouth Every 12 (Twelve) Hours.     • COMBIGAN 0.2-0.5 % ophthalmic solution instill 1 drop into both eyes twice a day  0   • EPINEPHrine (EPIPEN) 0.3 MG/0.3ML solution auto-injector injection inject 1 PEN INJECTOR intramuscularly as directed  0   • febuxostat (ULORIC) 80 MG tablet tablet TAKE 1 TABLET BY MOUTH EVERY DAY     • glucose blood test strip Use as instructed QID before meals and at bedtime 100 each 3   • hydrocortisone 2.5 % ointment APPLY TOPICALLY TO THE AFFECTED AREA TWICE DAILY FOR UP TO 2 WEEKS AS DIRECTED     • Lancets (ACCU-CHEK MULTICLIX) lancets Use as instructed QID before meals and at bedtime 1 each 0   • lisinopril (PRINIVIL,ZESTRIL) 20 MG  tablet TAKE 1 TABLET BY MOUTH DAILY 30 tablet 0   • Lumigan 0.01 % ophthalmic drops Administer 1 drop to both eyes Every Night.     • metFORMIN ER (Glucophage XR) 500 MG 24 hr tablet Take 1 tablet by mouth Daily With Breakfast. 90 tablet 1   • tamsulosin (FLOMAX) 0.4 MG capsule 24 hr capsule Take 1 capsule by mouth Daily. 90 capsule 3   • TOBRADEX 0.3-0.1 % ophthalmic ointment Administer 1 application to both eyes As Needed.  0   • vitamin D (ERGOCALCIFEROL) 1.25 MG (15632 UT) capsule capsule TAKE 1 CAPSULE BY MOUTH EVERY WEEK FOR 12 WEEKS     • Vitamin D, Cholecalciferol, (CHOLECALCIFEROL) 10 MCG (400 UNIT) tablet Take 400 Units by mouth Daily.     • Xiidra 5 % ophthalmic solution      • Sod Picosulfate-Mag Ox-Cit Acd 10-3.5-12 MG-GM -GM/160ML solution Take 160 mL by mouth Take As Directed for 2 doses. 320 mL 0   • [DISCONTINUED] Cetirizine HCl (ZYRTEC PO) Take 1 tablet by mouth Daily.       Current Facility-Administered Medications on File Prior to Visit   Medication Dose Route Frequency Provider Last Rate Last Admin   • [COMPLETED] barium (READI-CAT 2) suspension 450 mL  450 mL Oral Once in imaging Sebas Coats MD   450 mL at 03/09/22 1530   • [COMPLETED] iopamidol (ISOVUE-300) 61 % injection 100 mL  100 mL Intravenous Once in imaging Sebas Coats MD   100 mL at 03/09/22 1530       Allergies   Allergen Reactions   • Latex Rash   • Penicillins Rash       Social History     Socioeconomic History   • Marital status: Single   Tobacco Use   • Smoking status: Former Smoker     Packs/day: 0.25     Years: 25.00     Pack years: 6.25   • Smokeless tobacco: Never Used   Vaping Use   • Vaping Use: Former   • Substances: CBD   • Devices: Pre-filled or refillable cartridge   Substance and Sexual Activity   • Alcohol use: Yes     Comment: daily beer and/or liquor   • Drug use: No   • Sexual activity: Yes     Partners: Female       Family History   Problem Relation Age of Onset   • Cancer Mother         Colon   •  "Stroke Father        PHYSICAL EXAM:  Lungs without respiratory distress.  Heart regular rate and rhythm.  No jaundice or icterus    /92   Pulse 57   Temp 97.6 °F (36.4 °C)   Resp 16   Ht 165.1 cm (65\")   Wt 79.8 kg (176 lb)   SpO2 100%   BMI 29.29 kg/m²     ECOG score: 0             Lab Results   Component Value Date    HGB 11.3 (L) 08/17/2020    HCT 34.5 (L) 08/17/2020    MCV 79.5 08/17/2020     08/17/2020    WBC 6.12 08/17/2020    NEUTROABS 4.16 08/17/2020    LYMPHSABS 1.14 08/17/2020    MONOSABS 0.69 08/17/2020    EOSABS 0.09 08/17/2020    BASOSABS 0.02 08/17/2020     Lab Results   Component Value Date    GLUCOSE 120 (H) 08/17/2020    BUN 13 08/17/2020    CREATININE 0.90 03/09/2022     08/17/2020    K 4.5 08/17/2020     08/17/2020    CO2 26.4 08/17/2020    CALCIUM 9.7 08/17/2020    PROTEINTOT 7.3 06/10/2020    ALBUMIN 4.20 08/17/2020    BILITOT 0.3 06/10/2020    ALKPHOS 60 06/10/2020    AST 11 06/10/2020    ALT 13 06/10/2020         Assessment/Plan     1.  Well differentiated neuroendocrine grade 2 tumor on rectal mass biopsy by Dr. Coats 2/21/2022 with hyperplastic rectal polyp negative for dysplasia.  3/9/2022 CT chest abdomen pelvis with contrast shows no intrathoracic metastases.  Multiple low-density masses within the liver consistent with metastases the largest 4.1 cm.  An additional lateral segment of the left lobe of the liver measures 3.8 cm.  Rectal tumor measures 3.4 cm with adjacent lymphadenopathy.  Compared to CT abdomen February 2020 there are new sclerotic bone lesions L1 and L3 with mixed lytic and sclerotic lesions of T10.  2.  Diabetes  3.  Hypertension  4.  Hydrocele  5.  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 Sanz.  On 5 mg/day prednisone.  6.  Gout.  Allopurinol stopped due to renal dysfunction.  Colchicine available.  7.  Creatinine 1.7 on 7/7/2020.  Down to 0.9 by " 3/9/22    Oncology history timeline:  -3/10/2022 [facility] oncology initial consultation: He went for routine cancer screening in light of a mother who passed of colon cancer at age 52.  His last colonoscopy was over 10 years ago.  No change in color caliber or consistency of his stools.  Ran 3 miles today and feels great.  No somatic complaints whatsoever.  Recent colonoscopy with Dr. Coats showed well differentiated neuroendocrine grade 2 tumor on rectal mass biopsy by Dr. Coats 2/21/2022 with hyperplastic rectal polyp negative for dysplasia.  3/9/2022 CT chest abdomen pelvis with contrast shows no intrathoracic metastases.  Multiple low-density masses within the liver consistent with metastases the largest 4.1 cm.  An additional lateral segment of the left lobe of the liver measures 3.8 cm.  Rectal tumor measures 3.4 cm with adjacent lymphadenopathy.  Compared to CT abdomen February 2020 there are new sclerotic bone lesions L1 and L3 with mixed lytic and sclerotic lesions of T10.  While it is extremely unusual to have low-grade well differentiated neuroendocrine carcinoma of the rectum spreading to the bone, spread to the liver is very common in this area of primary for neuroendocrine carcinoma behaves much more poorly than other sites of primary.  We will get a copper Dotetate PET, neuron-specific enolase, chromogranin A, and 24-hour urine 5-HIAA and we will get him to Dr. Devendra Marquez who is a local expert in this particular unusual malignant process.  If the copper Dotetate PET lights up the bone and liver that is virtually pathognomonic for involvement with this low-grade process and systemic therapies whether that be with tyrosine kinase inhibitors or chemotherapeutic approaches with Capecitabine or Avastin or other combination therapies with Temodar I would defer to Dr. Marquez.  If on the other hand the copper Dotetate PET does not light up these march, then that raises the possibility that the  sample from the rectum is from a heterogeneous tumor that perhaps has more higher grade components perhaps small cell in characteristic for which aggressive chemotherapy with platinum, etoposide, Atezolizumab might be in order.  I will get all this started and have already been communicating with Dr. Marquez today about this nice gentleman.  He understands the guarded prognosis but the natural history is hard to predict and overall his quality of life is excellent and he is quite fit.    Total time of care today inclusive of time spent today prior to his arrival reviewing prior data from Dr. Coats and images and reports as outlined above and communicating in advance with Dr. Marquez to coordinate our plan as outlined above and during visit translating this plan to him and after visit putting forward this plan took 1 hour patient care time throughout the day today.    Satinder Galvez MD    3/10/2022

## 2022-03-14 LAB — CGA SERPL-MCNC: 93.9 NG/ML (ref 0–101.8)

## 2022-03-17 LAB — NSE SERPL IA-MCNC: 29.1 NG/ML (ref 0–17.6)

## 2022-03-18 LAB
CYTO UR: NORMAL
LAB AP CASE REPORT: NORMAL
LAB AP CLINICAL INFORMATION: NORMAL
LAB AP DIAGNOSIS COMMENT: NORMAL
LAB AP OUTSIDE REPORT, ADDENDUM: NORMAL
LAB AP SPECIAL STAINS: NORMAL
PATH REPORT.FINAL DX SPEC: NORMAL
PATH REPORT.GROSS SPEC: NORMAL

## 2022-04-01 ENCOUNTER — HOSPITAL ENCOUNTER (OUTPATIENT)
Dept: PET IMAGING | Facility: HOSPITAL | Age: 62
Discharge: HOME | End: 2022-04-01

## 2022-04-01 DIAGNOSIS — C7B.8 NEUROENDOCRINE CARCINOMA METASTATIC TO MULTIPLE SITES: ICD-10-CM

## 2022-04-01 DIAGNOSIS — C7A.8 NEUROENDOCRINE CARCINOMA METASTATIC TO MULTIPLE SITES: ICD-10-CM

## 2022-04-01 PROCEDURE — 0 COPPER CU 64 DOTATATE 1 MCI/ML SOLUTION: Performed by: INTERNAL MEDICINE

## 2022-04-01 PROCEDURE — A9592 COPPER CU 64 DOTATATE 1 MCI/ML SOLUTION: HCPCS | Performed by: INTERNAL MEDICINE

## 2022-04-01 PROCEDURE — 78815 PET IMAGE W/CT SKULL-THIGH: CPT

## 2022-04-01 RX ADMIN — COPPER CU 64 DOTATATE 1 DOSE: 1 INJECTION, SOLUTION INTRAVENOUS at 13:19

## 2022-06-04 ENCOUNTER — OFFICE VISIT (OUTPATIENT)
Dept: INTERNAL MEDICINE | Facility: CLINIC | Age: 62
End: 2022-06-04

## 2022-06-04 VITALS
BODY MASS INDEX: 28.99 KG/M2 | HEART RATE: 58 BPM | HEIGHT: 65 IN | RESPIRATION RATE: 16 BRPM | SYSTOLIC BLOOD PRESSURE: 112 MMHG | WEIGHT: 174 LBS | OXYGEN SATURATION: 98 % | DIASTOLIC BLOOD PRESSURE: 68 MMHG | TEMPERATURE: 97.3 F

## 2022-06-04 DIAGNOSIS — E11.59 TYPE 2 DIABETES MELLITUS WITH OTHER CIRCULATORY COMPLICATION, WITHOUT LONG-TERM CURRENT USE OF INSULIN: Primary | ICD-10-CM

## 2022-06-04 DIAGNOSIS — I10 ESSENTIAL HYPERTENSION: ICD-10-CM

## 2022-06-04 PROBLEM — C7A.8: Status: ACTIVE | Noted: 2022-04-05

## 2022-06-04 PROBLEM — C7B.8: Status: ACTIVE | Noted: 2022-04-05

## 2022-06-04 LAB
EXPIRATION DATE: ABNORMAL
HBA1C MFR BLD: 8.7 %
Lab: ABNORMAL

## 2022-06-04 PROCEDURE — 0054A COVID-19 (PFIZER) 12+ YRS: CPT

## 2022-06-04 PROCEDURE — 91305 COVID-19 (PFIZER) 12+ YRS: CPT

## 2022-06-04 PROCEDURE — 99214 OFFICE O/P EST MOD 30 MIN: CPT

## 2022-06-04 PROCEDURE — 83036 HEMOGLOBIN GLYCOSYLATED A1C: CPT

## 2022-06-04 RX ORDER — DUPILUMAB 300 MG/2ML
INJECTION, SOLUTION SUBCUTANEOUS
COMMUNITY
Start: 2022-04-08

## 2022-06-04 RX ORDER — ATORVASTATIN CALCIUM 20 MG/1
20 TABLET, FILM COATED ORAL DAILY
Qty: 90 TABLET | Refills: 1 | Status: SHIPPED | OUTPATIENT
Start: 2022-06-04

## 2022-06-04 RX ORDER — METFORMIN HYDROCHLORIDE EXTENDED-RELEASE TABLETS 1000 MG/1
1000 TABLET, FILM COATED, EXTENDED RELEASE ORAL 2 TIMES DAILY WITH MEALS
Qty: 180 TABLET | Refills: 1 | Status: SHIPPED | OUTPATIENT
Start: 2022-06-04 | End: 2022-06-07 | Stop reason: ALTCHOICE

## 2022-06-04 RX ORDER — CLOBETASOL PROPIONATE 0.5 MG/G
OINTMENT TOPICAL
COMMUNITY
Start: 2022-05-17

## 2022-06-04 RX ORDER — EVEROLIMUS 10 MG/1
TABLET ORAL
COMMUNITY
Start: 2022-04-05

## 2022-06-04 RX ORDER — LISINOPRIL 20 MG/1
20 TABLET ORAL DAILY
Qty: 90 TABLET | Refills: 1 | Status: SHIPPED | OUTPATIENT
Start: 2022-06-04 | End: 2023-03-06 | Stop reason: SDUPTHER

## 2022-06-04 NOTE — PROGRESS NOTES
Chief Complaint  Hypertension and Diabetes    Kenn Hawthorne presents to [facility]    DM- Taking 500 mg of metformin each day with no omitted doses and no side effects reported. Has been monitoring FPG daily with readings in 140-200's. Has been drinking more fruit juice recently. States he has been walking and jogging 4-5 days per week. Endorses polyphagia and polydipsia. Denies polyuria, neuropathies or poor wound healing.     HTN- Taking lisinopril each day with no omitted doses and no side effects reported. Does not monitor BP at home. Monitoring Na intake in the diet. Currently asymptomatic.     Subjective         Health Maintenance   Topic   • URINE MICROALBUMIN    • TDAP/TD VACCINES (1 - Tdap)   • DIABETIC EYE EXAM    • ZOSTER VACCINE (1 of 2)   • INFLUENZA VACCINE    • COVID-19 Vaccine (5 - Booster for Pfizer series)   • Pneumococcal Vaccine 0-64 (2 - PCV)   • DIABETIC FOOT EXAM    • ANNUAL PHYSICAL    • HEMOGLOBIN A1C    • COLORECTAL CANCER SCREENING    • HEPATITIS C SCREENING        [facility]  The following portions of the patient's history were reviewed and updated as appropriate: allergies, current medications, past family history, past medical history, past social history, past surgical history and problem list.     Past Medical History:   Diagnosis Date   • Eczema    • Glaucoma, left eye    • Hypertension       Allergies   Allergen Reactions   • Latex Rash   • Penicillins Rash      Social History     Tobacco Use   • Smoking status: Former Smoker     Packs/day: 0.25     Years: 25.00     Pack years: 6.25   • Smokeless tobacco: Never Used   Vaping Use   • Vaping Use: Former   • Substances: CBD   • Devices: Pre-filled or refillable cartridge   Substance Use Topics   • Alcohol use: Yes     Comment: daily beer and/or liquor   • Drug use: No     Past Surgical History:   Procedure Laterality Date   • BICEPS TENDON REPAIR        Family History   Problem Relation Age of Onset   •  Cancer Mother         Colon   • Stroke Father          Current Outpatient Medications:   •  Allergy Relief 180 MG tablet, Take 180 mg by mouth Daily., Disp: , Rfl:   •  Blood Glucose Monitoring Suppl w/Device kit, 1 kit 4 (Four) Times a Day Before Meals & at Bedtime., Disp: 1 each, Rfl: 0  •  clobetasol (TEMOVATE) 0.05 % ointment, APPLY TO AFFECTED AREA TWICE DAILY X 2 WEEKS, Disp: , Rfl:   •  colchicine 0.6 MG tablet, Take 1 tablet by mouth Every 12 (Twelve) Hours., Disp: , Rfl:   •  COMBIGAN 0.2-0.5 % ophthalmic solution, instill 1 drop into both eyes twice a day, Disp: , Rfl: 0  •  Dupilumab (Dupixent) 300 MG/2ML solution prefilled syringe, every 14 (fourteen) days., Disp: , Rfl:   •  EPINEPHrine (EPIPEN) 0.3 MG/0.3ML solution auto-injector injection, inject 1 PEN INJECTOR intramuscularly as directed, Disp: , Rfl: 0  •  everolimus (AFINITOR) 10 MG tablet, 1 tablet every other day. Swallow whole with a glass of water.  Do not take any tablet that is broken or crushed., Disp: , Rfl:   •  febuxostat (ULORIC) 80 MG tablet tablet, TAKE 1 TABLET BY MOUTH EVERY DAY, Disp: , Rfl:   •  glucose blood test strip, Use as instructed QID before meals and at bedtime, Disp: 100 each, Rfl: 3  •  hydrocortisone 2.5 % ointment, APPLY TOPICALLY TO THE AFFECTED AREA TWICE DAILY FOR UP TO 2 WEEKS AS DIRECTED, Disp: , Rfl:   •  Lancets (ACCU-CHEK MULTICLIX) lancets, Use as instructed QID before meals and at bedtime, Disp: 1 each, Rfl: 0  •  lisinopril (PRINIVIL,ZESTRIL) 20 MG tablet, Take 1 tablet by mouth Daily., Disp: 90 tablet, Rfl: 1  •  Lumigan 0.01 % ophthalmic drops, Administer 1 drop to both eyes Every Night., Disp: , Rfl:   •  tamsulosin (FLOMAX) 0.4 MG capsule 24 hr capsule, Take 1 capsule by mouth Daily., Disp: 90 capsule, Rfl: 3  •  TOBRADEX 0.3-0.1 % ophthalmic ointment, Administer 1 application to both eyes As Needed., Disp: , Rfl: 0  •  Vitamin D, Cholecalciferol, (CHOLECALCIFEROL) 10 MCG (400 UNIT) tablet, Take 400  "Units by mouth Daily., Disp: , Rfl:   •  Xiidra 5 % ophthalmic solution, , Disp: , Rfl:   •  atorvastatin (Lipitor) 20 MG tablet, Take 1 tablet by mouth Daily., Disp: 90 tablet, Rfl: 1  •  metFORMIN (FORTAMET) 1000 MG (OSM) 24 hr tablet, Take 1 tablet by mouth 2 (Two) Times a Day With Meals., Disp: 180 tablet, Rfl: 1    Review of Systems   Constitutional: Negative for activity change, appetite change, chills, fatigue and fever.   Eyes: Negative for blurred vision, double vision, photophobia and visual disturbance.   Respiratory: Negative for apnea, cough, choking, chest tightness, shortness of breath, wheezing and stridor.    Cardiovascular: Negative for chest pain, palpitations and leg swelling.   Gastrointestinal: Negative for abdominal distention, abdominal pain, anal bleeding, blood in stool, constipation, diarrhea, nausea, rectal pain, vomiting, GERD and indigestion.   Endocrine: Positive for polydipsia and polyphagia. Negative for polyuria.   Genitourinary: Negative for breast discharge, decreased libido, decreased urine volume, difficulty urinating, discharge, dysuria, flank pain, frequency, genital sores, hematuria, nocturia, penile pain, erectile dysfunction, penile swelling, scrotal swelling, testicular pain, urgency and urinary incontinence.       Objective   Vital Signs  /68   Pulse 58   Temp 97.3 °F (36.3 °C)   Resp 16   Ht 165.1 cm (65\")   Wt 78.9 kg (174 lb)   SpO2 98%   BMI 28.96 kg/m²     Physical Exam  Constitutional:       Appearance: Normal appearance.   HENT:      Head: Normocephalic.      Nose: Nose normal.      Mouth/Throat:      Mouth: Mucous membranes are moist.      Pharynx: Oropharynx is clear.   Eyes:      General: Lids are normal. Lids are everted, no foreign bodies appreciated. Vision grossly intact. Gaze aligned appropriately. No allergic shiner, visual field deficit or scleral icterus.        Right eye: No foreign body, discharge or hordeolum.         Left eye: No foreign " body, discharge or hordeolum.      Extraocular Movements:      Right eye: Normal extraocular motion and no nystagmus.      Left eye: Normal extraocular motion and no nystagmus.      Conjunctiva/sclera:      Right eye: Right conjunctiva is injected. No chemosis, exudate or hemorrhage.     Left eye: Left conjunctiva is injected. No chemosis, exudate or hemorrhage.  Cardiovascular:      Rate and Rhythm: Regular rhythm. Bradycardia present.      Pulses: Normal pulses.      Heart sounds: Normal heart sounds.   Pulmonary:      Effort: Pulmonary effort is normal.      Breath sounds: Normal breath sounds.   Abdominal:      General: Bowel sounds are normal.      Palpations: Abdomen is soft.   Skin:     General: Skin is warm and dry.      Capillary Refill: Capillary refill takes less than 2 seconds.   Neurological:      Mental Status: He is alert and oriented to person, place, and time.   Psychiatric:         Mood and Affect: Mood normal.         Behavior: Behavior normal.         Thought Content: Thought content normal.         Judgment: Judgment normal.          Result Review :     The following data was reviewed by: TIANNA Parker on 06/04/2022:  Most Recent A1C    HGBA1C Most Recent 6/4/22   Hemoglobin A1C 8.7             Assessment and Plan    1. Type 2 diabetes mellitus with other circulatory complication, without long-term current use of insulin ([facility])  - POC Glycosylated Hemoglobin (Hb A1C)  - metFORMIN (FORTAMET) 1000 MG (OSM) 24 hr tablet; Take 1 tablet by mouth 2 (Two) Times a Day With Meals.  Dispense: 180 tablet; Refill: 1  - CBC & Differential; Future  - Comprehensive Metabolic Panel; Future  - TSH Rfx On Abnormal To Free T4; Future  - atorvastatin (Lipitor) 20 MG tablet; Take 1 tablet by mouth Daily.  Dispense: 90 tablet; Refill: 1  - Uncontrolled and worsened. Will increase metformin to 2000 mg/day and have patient continue to monitor BG at home obtaining a combination of both fasting blood glucosesand  post-prandial blood glucoses. Discussed FBGs should be between  and post-prandial blood glucoses should be less than 180 to ensure that A1C is less than 7%. Will place patient on Lipitor as he is not on a statin at this time. Discussed need to recheck lipid panel in 1-3 months after being on statin. Encouraged to consume a diet low in saturated fat (13 g max), refined carbohydrates, and fructose corn syrup. Consume a diet high in fiber (30g/day), lean protein, and fresh produce intake. Increase physical activity to include 150 mins per week (30 mins per day 5 days per week) of moderate intensity aerobic activity and resistance training 2-3 x per week. Attempt to lose weight to achieve an ideal BMI of 18.5-24.9.   - Encouraged to perform foot inspection and foot care daily.    Goals for you:   • A1C less than 7%  • FPG should be   • PP should be less than 180  • BP should be less than 130/80  • LDL less than 100  • Albumin/creatinine ratio less than 30 mg/g  • Abstain from smoking and alcohol use  • Annual dilated eye exam  • Annual diabetic foot exam     Education performed during this visit includes long term diabetic complications and routine health maintenance including annual eye exams with ophthalmology, appropriate dental hygiene, and foot care. Medications, including side effects and compliance discussed carefully and Hypoglycemia management and prevention reviewed.   Discussed the nature of the disease including, risks, complications, implications, management, safe and proper use of medications. Encouraged therapeutic lifestyle changes including low calorie diet with plenty of fruits and vegetables, daily exercise, medication compliance, and keeping scheduled follow up appointments as indicated. Encouraged patient to have appointment for complete physical, fasting labs, appropriate screenings, and immunizations on an annual basis.    2. Essential hypertension  - lisinopril (PRINIVIL,ZESTRIL) 20  MG tablet; Take 1 tablet by mouth Daily.  Dispense: 90 tablet; Refill: 1  - CBC & Differential; Future  - Comprehensive Metabolic Panel; Future  - TSH Rfx On Abnormal To Free T4; Future  - Controlled based on in office reading. Continue with lisinopril as prescribed. K was elevated on last CMP but sample was hemolyzed. Will recheck. Encouraged to monitor BP at home. Discussed goal was less than 130/80.     Follow Up     Return in about 1 month (around 7/4/2022) for DM .    Patient was given instructions and counseling regarding his condition or for health maintenance advice. Please see specific information pulled into the AVS if appropriate.    Part of this note may be an electronic transcription/translation of spoken language to printed text using the Dragon Dictation System.    Electronically signed by:   TIANNA Parker  06/04/2022

## 2022-06-07 RX ORDER — METFORMIN HYDROCHLORIDE 500 MG/1
1000 TABLET, EXTENDED RELEASE ORAL 2 TIMES DAILY
Qty: 360 TABLET | Refills: 1 | Status: SHIPPED | OUTPATIENT
Start: 2022-06-07

## 2022-07-14 ENCOUNTER — TELEPHONE (OUTPATIENT)
Dept: PEDIATRICS | Facility: OTHER | Age: 62
End: 2022-07-14

## 2022-07-14 NOTE — TELEPHONE ENCOUNTER
Let pt know sent on 6/7/22, pt states the insurance won't cover. Called pharmacy, states due to being osmotic version they won't cover. Changed metformin to form that is covered per last rx sent. Pt informed.

## 2022-07-14 NOTE — TELEPHONE ENCOUNTER
Caller: MarineKenn    Relationship: Self    Best call back number: [phone]    What medications are you currently taking:   Current Outpatient Medications on File Prior to Visit   Medication Sig Dispense Refill   • Allergy Relief 180 MG tablet Take 180 mg by mouth Daily.     • atorvastatin (Lipitor) 20 MG tablet Take 1 tablet by mouth Daily. 90 tablet 1   • Blood Glucose Monitoring Suppl w/Device kit 1 kit 4 (Four) Times a Day Before Meals & at Bedtime. 1 each 0   • clobetasol (TEMOVATE) 0.05 % ointment APPLY TO AFFECTED AREA TWICE DAILY X 2 WEEKS     • colchicine 0.6 MG tablet Take 1 tablet by mouth Every 12 (Twelve) Hours.     • COMBIGAN 0.2-0.5 % ophthalmic solution instill 1 drop into both eyes twice a day  0   • Dupilumab (Dupixent) 300 MG/2ML solution prefilled syringe every 14 (fourteen) days.     • EPINEPHrine (EPIPEN) 0.3 MG/0.3ML solution auto-injector injection inject 1 PEN INJECTOR intramuscularly as directed  0   • everolimus (AFINITOR) 10 MG tablet 1 tablet every other day. Swallow whole with a glass of water.  Do not take any tablet that is broken or crushed.     • febuxostat (ULORIC) 80 MG tablet tablet TAKE 1 TABLET BY MOUTH EVERY DAY     • glucose blood test strip Use as instructed QID before meals and at bedtime 100 each 3   • hydrocortisone 2.5 % ointment APPLY TOPICALLY TO THE AFFECTED AREA TWICE DAILY FOR UP TO 2 WEEKS AS DIRECTED     • Lancets (ACCU-CHEK MULTICLIX) lancets Use as instructed QID before meals and at bedtime 1 each 0   • lisinopril (PRINIVIL,ZESTRIL) 20 MG tablet Take 1 tablet by mouth Daily. 90 tablet 1   • Lumigan 0.01 % ophthalmic drops Administer 1 drop to both eyes Every Night.     • metFORMIN ER (GLUCOPHAGE-XR) 500 MG 24 hr tablet Take 2 tablets by mouth 2 (Two) Times a Day. 360 tablet 1   • tamsulosin (FLOMAX) 0.4 MG capsule 24 hr capsule Take 1 capsule by mouth Daily. 90 capsule 3   • TOBRADEX 0.3-0.1 % ophthalmic ointment Administer 1 application to both  eyes As Needed.  0   • Vitamin D, Cholecalciferol, (CHOLECALCIFEROL) 10 MCG (400 UNIT) tablet Take 400 Units by mouth Daily.     • Xiidra 5 % ophthalmic solution        No current facility-administered medications on file prior to visit.        Which medication are you concerned about: METFORMIN 1000 MG    Who prescribed you this medication: RAMILA HERNANDEZ    What are your concerns:  MG METFORMIN LEFT. PATIENT STATES AT HIS LAST APPOINTMENT WITH RAMILA ON 6/4/22 HE WAS TOLD RAMILA WOULD BE INCREASING THE DOSAGE OF THE MEDICATION TO 1000 MG. PATIENT STATES THE PRESCRIPTION HOWEVER HAS NOT BEEN INCREASED AT THE PHARMACY AND WILL NEED HIS NEW PROVIDER ANTOINE CRANE TO CALL AND JUSTIFY WHY THE DOSAGE WILL NEED TO BE INCREASED. PATIENT HAS AN APPOINTMENT WITH ANTOINE CRANE ON 9/29/22.    [id number] [address] [place] [address] [address] AT [facility] [address] - [phone] [facility] [phone]   [phone]

## 2022-12-03 ENCOUNTER — OFFICE VISIT (OUTPATIENT)
Dept: INTERNAL MEDICINE | Facility: CLINIC | Age: 62
End: 2022-12-03

## 2022-12-03 VITALS
HEIGHT: 65 IN | BODY MASS INDEX: 25.99 KG/M2 | TEMPERATURE: 96.6 F | OXYGEN SATURATION: 97 % | SYSTOLIC BLOOD PRESSURE: 110 MMHG | WEIGHT: 156 LBS | HEART RATE: 70 BPM | DIASTOLIC BLOOD PRESSURE: 72 MMHG

## 2022-12-03 DIAGNOSIS — E11.59 TYPE 2 DIABETES MELLITUS WITH OTHER CIRCULATORY COMPLICATION, WITHOUT LONG-TERM CURRENT USE OF INSULIN: ICD-10-CM

## 2022-12-03 DIAGNOSIS — I10 ESSENTIAL HYPERTENSION: Primary | ICD-10-CM

## 2022-12-03 DIAGNOSIS — Z51.81 THERAPEUTIC DRUG MONITORING: ICD-10-CM

## 2022-12-03 PROCEDURE — 99213 OFFICE O/P EST LOW 20 MIN: CPT | Performed by: NURSE PRACTITIONER

## 2022-12-03 RX ORDER — PROCHLORPERAZINE 25 MG/1
SUPPOSITORY RECTAL
Qty: 1 EACH | Refills: 0 | Status: SHIPPED | OUTPATIENT
Start: 2022-12-03

## 2022-12-03 NOTE — PROGRESS NOTES
New Patient Office Visit      Date: 2022   Patient Name: Kenn Hawthorne  : 1960   MRN: [id number]     Chief Complaint:    Chief Complaint   Patient presents with   • Diabetes   • Hyperlipidemia   • Hypertension   • Rib Injury     Right side: Rib sticking out. Happened a couple of months ago       History of Present Illness: Kenn Hawthorne is a 62 y.o. male who is here today to establish care.     Care Team:  -Dr Ramsay is his medical oncologist. Currently undergoing tx for metastatic neuroendocrine tumor, on everlimus.    DM2- FPG running ~. During the daytime it is 90-100s. Does not check before bedtime. Current tx: metformin 1,000mg BID. Reports increased thirst lately.     HTN- Does not monitor his BP at home. He does follow a reduced Na diet. Current tx: lisinopril 20mg daily. Denies LE edema, CP, dyspnea, or blurred vision.     Subjective      Review of Systems:   Review of Systems   Constitutional: Negative for chills, fatigue and fever.   Eyes: Negative for blurred vision.   Respiratory: Negative for cough, shortness of breath and wheezing.    Cardiovascular: Negative for chest pain, palpitations and leg swelling.   Endocrine: Positive for polydipsia. Negative for polyphagia and polyuria.   Neurological: Negative for dizziness, weakness, light-headedness, numbness and headache.       Past Medical History:   Past Medical History:   Diagnosis Date   • Eczema    • Glaucoma, left eye    • Hypertension        Past Surgical History:   Past Surgical History:   Procedure Laterality Date   • BICEPS TENDON REPAIR         Family History:   Family History   Problem Relation Age of Onset   • Cancer Mother         Colon   • Stroke Father        Social History:   Social History     Socioeconomic History   • Marital status: Single   Tobacco Use   • Smoking status: Former     Packs/day: 0.25     Years: 25.00     Pack years: 6.25     Types: Cigarettes     Quit date:      Years since quitting: 10.9    • Smokeless tobacco: Never   Vaping Use   • Vaping Use: Former   • Substances: CBD   • Devices: Pre-filled or refillable cartridge   Substance and Sexual Activity   • Alcohol use: Yes     Comment: daily beer and/or liquor   • Drug use: No   • Sexual activity: Yes     Partners: Female       Medications:     Current Outpatient Medications:   •  Allergy Relief 180 MG tablet, Take 180 mg by mouth Daily., Disp: , Rfl:   •  atorvastatin (Lipitor) 20 MG tablet, Take 1 tablet by mouth Daily., Disp: 90 tablet, Rfl: 1  •  Blood Glucose Monitoring Suppl w/Device kit, 1 kit 4 (Four) Times a Day Before Meals & at Bedtime., Disp: 1 each, Rfl: 0  •  clobetasol (TEMOVATE) 0.05 % ointment, APPLY TO AFFECTED AREA TWICE DAILY X 2 WEEKS, Disp: , Rfl:   •  colchicine 0.6 MG tablet, Take 1 tablet by mouth Every 12 (Twelve) Hours., Disp: , Rfl:   •  COMBIGAN 0.2-0.5 % ophthalmic solution, instill 1 drop into both eyes twice a day, Disp: , Rfl: 0  •  Dupilumab (Dupixent) 300 MG/2ML solution prefilled syringe, every 14 (fourteen) days., Disp: , Rfl:   •  EPINEPHrine (EPIPEN) 0.3 MG/0.3ML solution auto-injector injection, inject 1 PEN INJECTOR intramuscularly as directed, Disp: , Rfl: 0  •  everolimus (AFINITOR) 10 MG tablet, 1 tablet every other day. Swallow whole with a glass of water.  Do not take any tablet that is broken or crushed., Disp: , Rfl:   •  febuxostat (ULORIC) 80 MG tablet tablet, TAKE 1 TABLET BY MOUTH EVERY DAY, Disp: , Rfl:   •  glucose blood test strip, Use as instructed QID before meals and at bedtime, Disp: 100 each, Rfl: 3  •  hydrocortisone 2.5 % ointment, APPLY TOPICALLY TO THE AFFECTED AREA TWICE DAILY FOR UP TO 2 WEEKS AS DIRECTED, Disp: , Rfl:   •  Lancets (ACCU-CHEK MULTICLIX) lancets, Use as instructed QID before meals and at bedtime, Disp: 1 each, Rfl: 0  •  lisinopril (PRINIVIL,ZESTRIL) 20 MG tablet, Take 1 tablet by mouth Daily., Disp: 90 tablet, Rfl: 1  •  Lumigan 0.01 % ophthalmic drops, Administer 1 drop  "to both eyes Every Night., Disp: , Rfl:   •  metFORMIN ER (GLUCOPHAGE-XR) 500 MG 24 hr tablet, Take 2 tablets by mouth 2 (Two) Times a Day., Disp: 360 tablet, Rfl: 1  •  tamsulosin (FLOMAX) 0.4 MG capsule 24 hr capsule, Take 1 capsule by mouth Daily., Disp: 90 capsule, Rfl: 3  •  TOBRADEX 0.3-0.1 % ophthalmic ointment, Administer 1 application to both eyes As Needed., Disp: , Rfl: 0  •  Vitamin D, Cholecalciferol, (CHOLECALCIFEROL) 10 MCG (400 UNIT) tablet, Take 400 Units by mouth Daily., Disp: , Rfl:   •  Xiidra 5 % ophthalmic solution, , Disp: , Rfl:   •  Continuous Blood Gluc Sensor (Dexcom G6 Sensor), Every 10 (Ten) Days., Disp: 1 each, Rfl: 0    Allergies:   Allergies   Allergen Reactions   • Latex Rash   • Penicillins Rash       Objective     Physical Exam:  Vital Signs:   Vitals:    12/03/22 0903   BP: 110/72   Pulse: 70   Temp: 96.6 °F (35.9 °C)   SpO2: 97%   Weight: 70.8 kg (156 lb)   Height: 165.1 cm (65\")   PainSc: 0-No pain     Body mass index is 25.96 kg/m².  BMI is >= 25 and <30. (Overweight) The following options were offered after discussion;: nutrition counseling/recommendations and pharmacological intervention options       Physical Exam  Vitals and nursing note reviewed.   Constitutional:       General: He is not in acute distress.     Appearance: Normal appearance. He is normal weight. He is not ill-appearing.   HENT:      Head: Normocephalic and atraumatic.      Mouth/Throat:      Mouth: Mucous membranes are moist.      Pharynx: Oropharynx is clear. No oropharyngeal exudate or posterior oropharyngeal erythema.   Cardiovascular:      Rate and Rhythm: Normal rate and regular rhythm.      Heart sounds: Normal heart sounds.   Pulmonary:      Effort: Pulmonary effort is normal.      Breath sounds: Normal breath sounds.   Musculoskeletal:      Right lower leg: No edema.      Left lower leg: No edema.   Skin:     General: Skin is warm and dry.      Capillary Refill: Capillary refill takes less than 2 " seconds.   Neurological:      General: No focal deficit present.      Mental Status: He is alert and oriented to person, place, and time. Mental status is at baseline.      Motor: No weakness.   Psychiatric:         Mood and Affect: Mood normal.         Behavior: Behavior normal.         Thought Content: Thought content normal.         Judgment: Judgment normal.         Procedures    Results:     Labs:     Imaging:     Assessment / Plan      Assessment/Plan:   Diagnoses and all orders for this visit:    1. Essential hypertension (Primary)  -continue lisinopril as prescribed   -continue monitoring BP/HR and notify if any issues or symptoms   -normal kidney and liver function with CMP from 10/2022    2. Type 2 diabetes mellitus with other circulatory complication, without long-term current use of insulin (HCC)  -     Continuous Blood Gluc Sensor (Dexcom G6 Sensor); Every 10 (Ten) Days.  Dispense: 1 each; Refill: 0  -Pt requests CGM device.   -Continue metformin as prescribed  -Of note, he is on everolimus for treatment of metastatic neuroendocrine carcinoma. This med can cause hyperglycemia. This will make DM more difficult to control with potential for changes in glucose-- so CGM would be beneficial.     3. Therapeutic drug monitoring  -     Continuous Blood Gluc Sensor (Dexcom G6 Sensor); Every 10 (Ten) Days.  Dispense: 1 each; Refill: 0         Follow Up:   Return in about 4 weeks (around 12/31/2022) for Annual, labs with next visit.    TIANNA Ayon  [facility] [place]

## 2022-12-10 ENCOUNTER — TELEPHONE (OUTPATIENT)
Dept: INTERNAL MEDICINE | Facility: CLINIC | Age: 62
End: 2022-12-10

## 2022-12-10 NOTE — TELEPHONE ENCOUNTER
RUDOLPH CALLED AND HE SAID [facility] NEEDS A PA FOR Continuous Blood Gluc Sensor (Dexcom G6 Sensor)

## 2022-12-19 DIAGNOSIS — N40.1 BENIGN LOCALIZED PROSTATIC HYPERPLASIA WITH LOWER URINARY TRACT SYMPTOMS (LUTS): ICD-10-CM

## 2022-12-19 DIAGNOSIS — E11.59 TYPE 2 DIABETES MELLITUS WITH OTHER CIRCULATORY COMPLICATION, WITHOUT LONG-TERM CURRENT USE OF INSULIN: ICD-10-CM

## 2022-12-19 NOTE — TELEPHONE ENCOUNTER
Caller: Kenn Hawthorne    Relationship: Self    Best call back number:[phone]    Requested Prescriptions:   Requested Prescriptions     Pending Prescriptions Disp Refills   • tamsulosin (FLOMAX) 0.4 MG capsule 24 hr capsule 90 capsule 3     Sig: Take 1 capsule by mouth Daily.   • Blood Glucose Monitoring Suppl w/Device kit 1 each 0     Si kit 4 (Four) Times a Day Before Meals & at Bedtime.      NEED PRIOR AUTHORIZATION FOR BLOOD GLUCOSE MONITORING SUPPLY    Pharmacy where request should be sent:      [id number], located within [facility] [address]  [address] AT [facility]  & [address] [facility] - [phone] [facility] [phone] FX        Additional details provided by patient:     Does the patient have less than a 3 day supply:  [x] Yes  [] No    Would you like a call back once the refill request has been completed: [x] Yes [] No    If the office needs to give you a call back, can they leave a voicemail: [x] Yes [] No    Debi Brandon Rep   22 10:50 EST

## 2022-12-20 ENCOUNTER — TELEPHONE (OUTPATIENT)
Dept: INTERNAL MEDICINE | Facility: CLINIC | Age: 62
End: 2022-12-20

## 2022-12-20 RX ORDER — TAMSULOSIN HYDROCHLORIDE 0.4 MG/1
1 CAPSULE ORAL DAILY
Qty: 90 CAPSULE | Refills: 1 | Status: SHIPPED | OUTPATIENT
Start: 2022-12-20

## 2022-12-20 NOTE — TELEPHONE ENCOUNTER
Pharmacy Name:  [facility]    Pharmacy representative name: SUSY    Pharmacy representative phone number: [phone]    What medication are you calling in regards to: DEXCOM EQUIPMENT     What question does the pharmacy have: HAS THERE BEEN A PRIOR AUTHORIZATION ON THE DEXCOM  G6 EQUIPTMENT?    Who is the provider that prescribed the medication: TIANNA AVALOS    Additional notes: PHARMACY WILL PUT PRESCRIPTION THAT WAS CALLED IN ON 12/20/22 ON HOLD UNTIL THEY HEAR OTHERWISE

## 2022-12-20 NOTE — TELEPHONE ENCOUNTER
Caller: Kenn Hawthorne    Relationship: Self    Best call back number: [phone]    What is the best time to reach you: ANY TIME    Who are you requesting to speak with (clinical staff, provider,  specific staff member): MARGOTH JIMENEZ    Do you know the name of the person who called: SELF    What was the call regarding: PATIENT CALLED CHECKING ON THE PRIOR AUTHORIZATION FOR THE DEXCOM. PLEASE ADVISE AND CALL PATIENT BACK    Do you require a callback: YES

## 2022-12-30 ENCOUNTER — OFFICE VISIT (OUTPATIENT)
Dept: INTERNAL MEDICINE | Facility: CLINIC | Age: 62
End: 2022-12-30
Payer: COMMERCIAL

## 2022-12-30 VITALS
HEART RATE: 60 BPM | SYSTOLIC BLOOD PRESSURE: 114 MMHG | TEMPERATURE: 97.9 F | HEIGHT: 65 IN | DIASTOLIC BLOOD PRESSURE: 70 MMHG | WEIGHT: 162 LBS | OXYGEN SATURATION: 99 % | BODY MASS INDEX: 26.99 KG/M2

## 2022-12-30 DIAGNOSIS — R10.9 FLANK PAIN: ICD-10-CM

## 2022-12-30 DIAGNOSIS — M54.50 BILATERAL LOW BACK PAIN WITHOUT SCIATICA, UNSPECIFIED CHRONICITY: Primary | ICD-10-CM

## 2022-12-30 DIAGNOSIS — E11.59 TYPE 2 DIABETES MELLITUS WITH OTHER CIRCULATORY COMPLICATION, WITHOUT LONG-TERM CURRENT USE OF INSULIN: ICD-10-CM

## 2022-12-30 DIAGNOSIS — M62.838 MUSCLE SPASM: ICD-10-CM

## 2022-12-30 PROCEDURE — 96372 THER/PROPH/DIAG INJ SC/IM: CPT | Performed by: NURSE PRACTITIONER

## 2022-12-30 PROCEDURE — 99213 OFFICE O/P EST LOW 20 MIN: CPT | Performed by: NURSE PRACTITIONER

## 2022-12-30 PROCEDURE — 81002 URINALYSIS NONAUTO W/O SCOPE: CPT | Performed by: NURSE PRACTITIONER

## 2022-12-30 RX ORDER — KETOROLAC TROMETHAMINE 30 MG/ML
60 INJECTION, SOLUTION INTRAMUSCULAR; INTRAVENOUS ONCE
Status: COMPLETED | OUTPATIENT
Start: 2022-12-30 | End: 2022-12-30

## 2022-12-30 RX ORDER — CYCLOBENZAPRINE HCL 10 MG
10 TABLET ORAL 3 TIMES DAILY PRN
Qty: 21 TABLET | Refills: 0 | Status: SHIPPED | OUTPATIENT
Start: 2022-12-30

## 2022-12-30 RX ADMIN — KETOROLAC TROMETHAMINE 60 MG: 30 INJECTION, SOLUTION INTRAMUSCULAR; INTRAVENOUS at 14:59

## 2022-12-30 NOTE — PROGRESS NOTES
Follow Up Office Visit      Date: 2022   Patient Name: Kenn Hawthorne  : 1960   MRN: [id number]     Chief Complaint:    Chief Complaint   Patient presents with   • Flank Pain     Right side/ cramps at times/ dull pain right now        History of Present Illness: Kenn Hawthorne is a 62 y.o. male who is here today for an acute sick visit. The patient is here today to follow up with lower back pain.    Low back pain  The patient complains of sharp pain along his left lower back which started on 2022.  He reports that he started to feel some pain along his right side on 2022 potentially due to overcompensating.  The patient reports having a similar situation a few years while going up the stairs and it was deduced to be potentially muscular-related following his MRI.  He had another episode 6 months ago while picking something up from the ground and it lasted 1 to 2 days.  He denies any abdominal pain, nausea, GI symptoms, or urinary symptoms.  He denies any radiating pain, numbness down both legs, weakness, pain in the hips, numbness in the groin, or loss of bowel or bladder control.  He admits to having muscle spasms long his hamstrings.  He admits to being fatigue but attributes it to his cancer.  He reports that his back feels better after doing yoga and back stretches.    Arthritis  The patient reports he had blood work completed on 2022.  He reports the also had a PET scan on 2022 which showed stable ds. He does have metastases to the bone.     He mentions that he has still been unable to obtain an approval for a continuous glucose monitor.    Subjective      Review of Systems:   Review of Systems   Musculoskeletal: Positive for arthralgias, back pain and myalgias.   Neurological: Negative for weakness and numbness.        Denies saddle anesthesia, loss of bowel/ bladder control        I have reviewed the patients family history, social history, past medical history, past  surgical history and have updated it as appropriate.     Medications:     Current Outpatient Medications:   •  Allergy Relief 180 MG tablet, Take 180 mg by mouth Daily., Disp: , Rfl:   •  atorvastatin (Lipitor) 20 MG tablet, Take 1 tablet by mouth Daily., Disp: 90 tablet, Rfl: 1  •  Blood Glucose Monitoring Suppl w/Device kit, 1 kit 4 (Four) Times a Day Before Meals & at Bedtime., Disp: 1 each, Rfl: 0  •  clobetasol (TEMOVATE) 0.05 % ointment, APPLY TO AFFECTED AREA TWICE DAILY X 2 WEEKS, Disp: , Rfl:   •  COMBIGAN 0.2-0.5 % ophthalmic solution, instill 1 drop into both eyes twice a day, Disp: , Rfl: 0  •  Continuous Blood Gluc Sensor (Dexcom G6 Sensor), Every 10 (Ten) Days., Disp: 1 each, Rfl: 0  •  Dupilumab (Dupixent) 300 MG/2ML solution prefilled syringe, every 14 (fourteen) days., Disp: , Rfl:   •  EPINEPHrine (EPIPEN) 0.3 MG/0.3ML solution auto-injector injection, inject 1 PEN INJECTOR intramuscularly as directed, Disp: , Rfl: 0  •  everolimus (AFINITOR) 10 MG tablet, 1 tablet every other day. Swallow whole with a glass of water.  Do not take any tablet that is broken or crushed., Disp: , Rfl:   •  febuxostat (ULORIC) 80 MG tablet tablet, TAKE 1 TABLET BY MOUTH EVERY DAY, Disp: , Rfl:   •  glucose blood test strip, Use as instructed QID before meals and at bedtime, Disp: 100 each, Rfl: 3  •  Lancets (ACCU-CHEK MULTICLIX) lancets, Use as instructed QID before meals and at bedtime, Disp: 1 each, Rfl: 0  •  lisinopril (PRINIVIL,ZESTRIL) 20 MG tablet, Take 1 tablet by mouth Daily., Disp: 90 tablet, Rfl: 1  •  Lumigan 0.01 % ophthalmic drops, Administer 1 drop to both eyes Every Night., Disp: , Rfl:   •  metFORMIN ER (GLUCOPHAGE-XR) 500 MG 24 hr tablet, Take 2 tablets by mouth 2 (Two) Times a Day., Disp: 360 tablet, Rfl: 1  •  tamsulosin (FLOMAX) 0.4 MG capsule 24 hr capsule, Take 1 capsule by mouth Daily., Disp: 90 capsule, Rfl: 1  •  Vitamin D, Cholecalciferol, (CHOLECALCIFEROL) 10 MCG (400 UNIT) tablet, Take 400  "Units by mouth Daily., Disp: , Rfl:   •  Xiidra 5 % ophthalmic solution, , Disp: , Rfl:   •  cyclobenzaprine (FLEXERIL) 10 MG tablet, Take 1 tablet by mouth 3 (Three) Times a Day As Needed for Muscle Spasms., Disp: 21 tablet, Rfl: 0    Allergies:   Allergies   Allergen Reactions   • Latex Rash   • Penicillins Rash       Objective     Physical Exam: Please see above  Vital Signs:   Vitals:    12/30/22 1354   BP: 114/70   Pulse: 60   Temp: 97.9 °F (36.6 °C)   SpO2: 99%   Weight: 73.5 kg (162 lb)   Height: 165.1 cm (65\")   PainSc: 0-No pain     Body mass index is 26.96 kg/m².    Physical Exam  Vitals and nursing note reviewed.   Constitutional:       General: He is not in acute distress.     Appearance: Normal appearance.   HENT:      Head: Normocephalic and atraumatic.   Cardiovascular:      Rate and Rhythm: Normal rate and regular rhythm.      Heart sounds: Normal heart sounds.   Pulmonary:      Effort: Pulmonary effort is normal. No respiratory distress.   Abdominal:      Tenderness: There is no right CVA tenderness or left CVA tenderness.   Musculoskeletal:      Cervical back: Normal. No bony tenderness.      Thoracic back: Normal. No bony tenderness.      Lumbar back: Spasms and tenderness present. No swelling, edema, signs of trauma or bony tenderness.      Right upper leg: Normal.      Left upper leg: Normal.   Skin:     General: Skin is warm and dry.   Neurological:      Mental Status: He is alert and oriented to person, place, and time.      Motor: No weakness.           Results:   Imaging:   Ct 12/2022  Labs:   CMP 10/2022    Assessment / Plan      Assessment/Plan:   Diagnoses and all orders for this visit:    1. Bilateral low back pain without sciatica, unspecified chronicity (Primary)  -     cyclobenzaprine (FLEXERIL) 10 MG tablet; Take 1 tablet by mouth 3 (Three) Times a Day As Needed for Muscle Spasms.  Dispense: 21 tablet; Refill: 0  -     ketorolac (TORADOL) injection 60 mg  -Appears to be more muscular " in nature than skeletal. more muscular   -Restaging CT scan of abd/pelvis from 12/2022 demonstrates grossly unchanged spinal and pelvic osseous metastatic disease  -ice vs heat     2. Flank pain  -     POC Urinalysis Dipstick: no indications of infection       3. Muscle spasm  -     cyclobenzaprine (FLEXERIL) 10 MG tablet; Take 1 tablet by mouth 3 (Three) Times a Day As Needed for Muscle Spasms.  Dispense: 21 tablet; Refill: 0    4. Type 2 diabetes mellitus with other circulatory complication, without long-term current use of insulin ([facility])  -     Blood Glucose Monitoring Suppl w/Device kit; 1 kit 4 (Four) Times a Day Before Meals & at Bedtime.  Dispense: 1 each; Refill: 0         Follow Up:   Return if symptoms worsen or fail to improve, for Next scheduled follow up.    TIANNA Ayon  [facility] [place]     Transcribed from ambient dictation for TIANNA Reilly by Viral Dangelo.  12/30/22   15:45 EST    Patient or patient representative verbalized consent to the visit recording.  I have personally performed the services described in this document as transcribed by the above individual, and it is both accurate and complete.

## 2023-01-09 ENCOUNTER — TELEPHONE (OUTPATIENT)
Dept: INTERNAL MEDICINE | Facility: CLINIC | Age: 63
End: 2023-01-09
Payer: COMMERCIAL

## 2023-01-09 NOTE — TELEPHONE ENCOUNTER
Provider: TIANNA AVALOS    Caller: RUDOLPH MAGGIE    Phone Number: [phone]    Reason for Call: PATIENT SAW DR PATRCIK AT [facility] AND HAD SOME TESTS RAN. PATIENT WAS CONTACTED AND TOLD TO INFORM MARGOTH THAT HIS ANEMIA  LEVELS WERE HIGH. THE CENTER HAS FAXED OVER LAB RESULTS TO THE OFFICE

## 2023-01-12 NOTE — TELEPHONE ENCOUNTER
Pt states labs done on 12/22/22. Let pt know we haven't received results yet, and will call [facility] to request.

## 2023-01-13 ENCOUNTER — TELEPHONE (OUTPATIENT)
Dept: INTERNAL MEDICINE | Facility: CLINIC | Age: 63
End: 2023-01-13
Payer: COMMERCIAL

## 2023-01-13 DIAGNOSIS — E11.59 TYPE 2 DIABETES MELLITUS WITH OTHER CIRCULATORY COMPLICATION, WITHOUT LONG-TERM CURRENT USE OF INSULIN: ICD-10-CM

## 2023-01-13 DIAGNOSIS — Z00.00 ANNUAL PHYSICAL EXAM: ICD-10-CM

## 2023-01-13 DIAGNOSIS — D50.9 MICROCYTIC ANEMIA: Primary | ICD-10-CM

## 2023-01-13 DIAGNOSIS — I10 ESSENTIAL HYPERTENSION: ICD-10-CM

## 2023-01-13 DIAGNOSIS — Z12.5 PROSTATE CANCER SCREENING: ICD-10-CM

## 2023-01-13 NOTE — TELEPHONE ENCOUNTER
Called and spoke with patient regarding lab results that were received yesterday, but collected on December 22nd by/ at rheumatology office.  Patient previously called the office stating instructed him to contact his PCP regarding anemia.  Microcytic anemia with hgb- 10.8, MCV- 71, RDW- 19.5. Mr. Hawthorne is scheduled for his annual physical with me tomorrow, and we will discuss anemia plan in more detail face-to-face.  Unfortunately, our lab is closed on Saturdays.  We will plan to add a few additional lab studies as screening for anemia to the ones I plans on getting as part of his annual physical exam.  Instructed pt to contact his oncologist, Dr. Marquez, about the anemia as well.  I will of course defer to the specialist if he prefers to manage.  Otherwise, I will see the patient back tomorrow in clinic as scheduled.

## 2023-01-14 ENCOUNTER — OFFICE VISIT (OUTPATIENT)
Dept: INTERNAL MEDICINE | Facility: CLINIC | Age: 63
End: 2023-01-14
Payer: COMMERCIAL

## 2023-01-14 VITALS
BODY MASS INDEX: 26.82 KG/M2 | HEIGHT: 65 IN | HEART RATE: 68 BPM | DIASTOLIC BLOOD PRESSURE: 70 MMHG | TEMPERATURE: 97.2 F | SYSTOLIC BLOOD PRESSURE: 104 MMHG | WEIGHT: 161 LBS | OXYGEN SATURATION: 98 % | RESPIRATION RATE: 16 BRPM

## 2023-01-14 DIAGNOSIS — C7B.8 NEUROENDOCRINE CARCINOMA METASTATIC TO MULTIPLE SITES: Chronic | ICD-10-CM

## 2023-01-14 DIAGNOSIS — N40.1 BENIGN LOCALIZED PROSTATIC HYPERPLASIA WITH LOWER URINARY TRACT SYMPTOMS (LUTS): ICD-10-CM

## 2023-01-14 DIAGNOSIS — I10 ESSENTIAL HYPERTENSION: ICD-10-CM

## 2023-01-14 DIAGNOSIS — D50.9 MICROCYTIC ANEMIA: ICD-10-CM

## 2023-01-14 DIAGNOSIS — Z00.00 ANNUAL PHYSICAL EXAM: Primary | ICD-10-CM

## 2023-01-14 DIAGNOSIS — E55.9 VITAMIN D DEFICIENCY: ICD-10-CM

## 2023-01-14 DIAGNOSIS — E11.59 TYPE 2 DIABETES MELLITUS WITH OTHER CIRCULATORY COMPLICATION, WITHOUT LONG-TERM CURRENT USE OF INSULIN: ICD-10-CM

## 2023-01-14 DIAGNOSIS — H91.92 DECREASED HEARING OF LEFT EAR: ICD-10-CM

## 2023-01-14 DIAGNOSIS — M54.50 BILATERAL LOW BACK PAIN WITHOUT SCIATICA, UNSPECIFIED CHRONICITY: ICD-10-CM

## 2023-01-14 DIAGNOSIS — C7A.8 NEUROENDOCRINE CARCINOMA METASTATIC TO MULTIPLE SITES: Chronic | ICD-10-CM

## 2023-01-14 PROCEDURE — 99396 PREV VISIT EST AGE 40-64: CPT | Performed by: NURSE PRACTITIONER

## 2023-01-14 RX ORDER — FERROUS SULFATE 325(65) MG
325 TABLET ORAL
Qty: 30 TABLET | Refills: 2 | Status: SHIPPED | OUTPATIENT
Start: 2023-01-14

## 2023-01-14 RX ORDER — ERGOCALCIFEROL 1.25 MG/1
50000 CAPSULE ORAL WEEKLY
COMMUNITY
Start: 2023-01-06

## 2023-01-14 NOTE — PROGRESS NOTES
Male Physical Note      Date: 2023   Patient Name: Kenn Hawthorne  : 1960   MRN: [id number]     Chief Complaint:    Chief Complaint   Patient presents with   • Annual Exam     Pt c/o flank pain on both sides, not improving since last visit   • Flank Pain       History of Present Illness: Kenn Hawthorne is a 62 y.o. male who is here today for their annual health maintenance and physical.  He has again c/o acute flank pain.      Health maintenance  Has been admitted to the hospital in 2022 and a second time in 2022 for an embolism.  Goes to an ophthalmologist and dentist regularly.   Notes his hearing seems off.  Feels his mood has been pretty good.  Immunizations: due zoster, PCV, Tdap  Has a generally healthy diet, diabetic friendly  Gets regular exercise, both walking and running ~5d/week-- limited lately due to ongoing side pain   States he has some pain when going up the stairs.   Also has pain when doing jumping jacks.   Drinks a lot of water.  Denies smoking   Drinks a small amount of alcohol.  Consumes CBD gummies.  Last colonoscopy was in 2022, by Dr. Coats which is when his cancer was initially found   He normally has a colonoscopy every 2 years due to a history of polyps.   Saw his ophthalmologist, Dr. Garcia approximately 1 weeks ago.     Medical history  Has a history of glaucoma.  Notes he has loss of hearing that is worst in one ear.     Bilateral flank pain  Notes he felt pain when going up the stairs.  Had an MRI and after his pain seemed to resolve.  Then he noticed pain when bending down.   Reports increased pain when laying on his back in bed.  Also has pain with sneezing.   States his pain feel more muscular.  Confirms the injection he received last week helped resolve his pain.   Has not hear anything back from an oncologist so he will call on Monday, 2023.    Medication  States he takes Everolimus every other day, instead of daily  States he noticed a change in  his pain level since making this change  Denies needing any refills on his medication.  Has a history of vitamin D deficiency.  Confirms the use of a vitamin D supplement.  Denies the use of a multivitamin.   Confirms the use of metformin and tamsulosin.   Notes his nocturia has improved with he use of tamsulosin.     Diabetes  States his blood glucose has been good.   Notes he went from the 200 mg/dL range to the 130 mg/dL range.    Blood pressure  His blood pressure is within normal range today.   He is requesting a recommendation for a blood pressure cuff.    Anemia  Notes he was having low iron issues prior to his cancer diagnosis.  Also has joint pain secondary to arthritis.  Met with a hem/ oncologist in the past due to low iron levels.  Denies taking oral iron.   Had an iron infusion in 07/2022.        Subjective      Review of Systems:   Review of Systems   Constitutional: Positive for activity change.   HENT: Negative.    Respiratory: Negative for cough, shortness of breath and wheezing.    Cardiovascular: Negative for chest pain.   Gastrointestinal: Negative.    Endocrine: Negative for polydipsia, polyphagia and polyuria.   Musculoskeletal: Positive for myalgias.   Psychiatric/Behavioral: Negative for sleep disturbance and depressed mood. The patient is not nervous/anxious.        Past Medical History, Social History, Family History and Care Team were all reviewed with patient and updated as appropriate.     Medications:     Current Outpatient Medications:   •  Allergy Relief 180 MG tablet, Take 180 mg by mouth Daily., Disp: , Rfl:   •  atorvastatin (Lipitor) 20 MG tablet, Take 1 tablet by mouth Daily., Disp: 90 tablet, Rfl: 1  •  Blood Glucose Monitoring Suppl w/Device kit, 1 kit 4 (Four) Times a Day Before Meals & at Bedtime., Disp: 1 each, Rfl: 0  •  clobetasol (TEMOVATE) 0.05 % ointment, APPLY TO AFFECTED AREA TWICE DAILY X 2 WEEKS, Disp: , Rfl:   •  COMBIGAN 0.2-0.5 % ophthalmic solution, instill 1  drop into both eyes twice a day, Disp: , Rfl: 0  •  Continuous Blood Gluc Sensor (Dexcom G6 Sensor), Every 10 (Ten) Days., Disp: 1 each, Rfl: 0  •  cyclobenzaprine (FLEXERIL) 10 MG tablet, Take 1 tablet by mouth 3 (Three) Times a Day As Needed for Muscle Spasms., Disp: 21 tablet, Rfl: 0  •  Dupilumab (Dupixent) 300 MG/2ML solution prefilled syringe, every 14 (fourteen) days., Disp: , Rfl:   •  everolimus (AFINITOR) 10 MG tablet, 1 tablet every other day. Swallow whole with a glass of water.  Do not take any tablet that is broken or crushed., Disp: , Rfl:   •  febuxostat (ULORIC) 80 MG tablet tablet, TAKE 1 TABLET BY MOUTH EVERY DAY, Disp: , Rfl:   •  glucose blood test strip, Use as instructed QID before meals and at bedtime, Disp: 100 each, Rfl: 3  •  Lancets (ACCU-CHEK MULTICLIX) lancets, Use as instructed QID before meals and at bedtime, Disp: 1 each, Rfl: 0  •  lisinopril (PRINIVIL,ZESTRIL) 20 MG tablet, Take 1 tablet by mouth Daily., Disp: 90 tablet, Rfl: 1  •  Lumigan 0.01 % ophthalmic drops, Administer 1 drop to both eyes Every Night., Disp: , Rfl:   •  metFORMIN ER (GLUCOPHAGE-XR) 500 MG 24 hr tablet, Take 2 tablets by mouth 2 (Two) Times a Day., Disp: 360 tablet, Rfl: 1  •  tamsulosin (FLOMAX) 0.4 MG capsule 24 hr capsule, Take 1 capsule by mouth Daily., Disp: 90 capsule, Rfl: 1  •  vitamin D (ERGOCALCIFEROL) 1.25 MG (18530 UT) capsule capsule, Take 50,000 Units by mouth 1 (One) Time Per Week., Disp: , Rfl:   •  Vitamin D, Cholecalciferol, (CHOLECALCIFEROL) 10 MCG (400 UNIT) tablet, Take 400 Units by mouth Daily., Disp: , Rfl:   •  Xiidra 5 % ophthalmic solution, , Disp: , Rfl:   •  ferrous sulfate (FerrouSul) 325 (65 FE) MG tablet, Take 1 tablet by mouth Daily With Breakfast., Disp: 30 tablet, Rfl: 2    Allergies:   Allergies   Allergen Reactions   • Latex Rash   • Penicillins Rash       Immunizations:  Td/Tdap(Booster Q 10 yrs):    Flu (Yearly):   Pneumovax (1 yr after Prevnar):    [id number] (1 yr after  "Pneumo):   Colorectal Screening:     Last Completed Colonoscopy          COLORECTAL CANCER SCREENING (COLONOSCOPY - Yearly) Next due on 2022  SCANNED - COLONOSCOPY    2011  COLONOSCOPY (Done)               CT for Smoker (Age 55-75, 30pk yr):    Bone Density/DEXA:   Hep C ( 8287-8481):    PSA(Over age 50):    US Aorta (For male smokers, age 65):      Diet/Physical activity    Sexual health: none     PHQ-2 Depression Screening  Little interest or pleasure in doing things?     Feeling down, depressed, or hopeless?     PHQ-2 Total Score           Intimate partner violence: (Screen on initial visit, older adult with injury or evidence of neglect):  • Violence can be a problem in many people's lives, so I now ask every patient about trauma or abuse they may have experienced in a relationship.  • Stress/Safety - Do you feel safe in your relationship?  • Afraid/Abused - Have you ever been in a relationship where you were threatened, hurt, or afraid?  • Friend/Family - Are your friends aware you have been hurt?  • Emergency Plan - Do you have a safe place to go and the resources you need in an emergency?    Osteoporosis:   • Men: history of low trauma fracture, androgen deprivation therapy for prostate cancer, hypogonadism, primary hyperparathyroidism, intestinal disorders.     Objective     Physical Exam:  Vital Signs:   Vitals:    23 0943   BP: 104/70   Pulse: 68   Resp: 16   Temp: 97.2 °F (36.2 °C)   SpO2: 98%   Weight: 73 kg (161 lb)   Height: 165.1 cm (65\")   PainSc:   9     Body mass index is 26.79 kg/m².     Physical Exam  Vitals and nursing note reviewed.   Constitutional:       General: He is not in acute distress.     Appearance: Normal appearance. He is well-developed and normal weight. He is not ill-appearing.   HENT:      Head: Normocephalic and atraumatic.      Right Ear: Tympanic membrane, ear canal and external ear normal.      Left Ear: Tympanic membrane, ear canal and " external ear normal.      Nose: Nose normal.      Mouth/Throat:      Mouth: Mucous membranes are moist.      Pharynx: Oropharynx is clear. No oropharyngeal exudate or posterior oropharyngeal erythema.   Eyes:      Extraocular Movements: Extraocular movements intact.      Conjunctiva/sclera: Conjunctivae normal.      Pupils: Pupils are equal, round, and reactive to light.   Neck:      Trachea: No tracheal deviation.   Cardiovascular:      Rate and Rhythm: Normal rate and regular rhythm.      Heart sounds: Normal heart sounds.   Pulmonary:      Effort: Pulmonary effort is normal. No respiratory distress.      Breath sounds: Normal breath sounds.   Abdominal:      General: Bowel sounds are normal. There is no distension.      Palpations: Abdomen is soft.      Tenderness: There is no abdominal tenderness. There is no right CVA tenderness, left CVA tenderness, guarding or rebound.   Musculoskeletal:        Arms:       Cervical back: Neck supple.      Right lower leg: No edema.      Left lower leg: No edema.      Comments: The pain he is describing is located at bottom edge of rib cage and just below. It is not reproducible with palpation.    Lymphadenopathy:      Cervical: No cervical adenopathy.   Skin:     General: Skin is warm and dry.      Capillary Refill: Capillary refill takes less than 2 seconds.   Neurological:      General: No focal deficit present.      Mental Status: He is alert and oriented to person, place, and time. Mental status is at baseline.      Cranial Nerves: No cranial nerve deficit.      Motor: No weakness.      Coordination: Coordination normal.      Gait: Gait normal.   Psychiatric:         Mood and Affect: Mood normal.         Behavior: Behavior normal.         Thought Content: Thought content normal.         Judgment: Judgment normal.             Assessment / Plan      Assessment/Plan:   Diagnoses and all orders for this visit:    Annual PE   -     He was advised to get an over the counter  men's 50 and up multivitamin.         -     Labs ordered today, pt will return Monday for collection   -immunizations discussed, he will speak with his oncologist about best time to receive   -colonoscopy- rec 1 yr, due 2/2023 Dr Coats       Microcytic anemia   -     ferrous sulfate (FerrouSul) 325 (65 FE) MG tablet; Take 1 tablet by mouth Daily With Breakfast.  Dispense: 30 tablet; Refill: 2  -return Monday when lab is open for previously ordered labs, as discussed       Type 2 diabetes mellitus with other circulatory complication, without long-term current use of insulin (HCC)        -     His A1C will be re-checked Monday  -continue metformin 1,000mg BID  -continue atorvastatin 20mg daily       Essential hypertension        -     He was advised to buy a blood pressure cuff from his local [employer] or [employer].   -continue lisinopril 20mg daily  -goal <130/80    Vitamin D deficiency        - notable low level on recent labs checked by rheumatology  -continue 5000units weekly x12 w  - His vitamin D levels will be checked in 12 weeks, switch to maintenance dosing at that time or as otherwise instructed by rheumatologist     Bilateral low back pain without sciatica, unspecified chronicity  -continue flexeril PRN      Benign localized prostatic hyperplasia with lower urinary tract symptoms (LUTS)  -controlled   -continue tamsulosin 0.4mg daily      Neuroendocrine carcinoma metastatic to multiple sites (HCC)  -follow up with Dr Ramsay    Loss of hearing         -     He will be referred to audiology for a hearing test.      *ASCVD risk= 16.8% cont mod intensity statin, A1C at goal, cont metformin as prescribed     Follow Up:   Return in about 4 weeks (around 2/11/2023) for Recheck.  -f/u 4 weeks: anemia  -f/u 3m: DM recheck   -f/u 1yr: ELAINA Hawthorne voices understanding and acceptance of this advice and will call back with any further questions or concerns. AVS with preventive healthcare tips printed for  patient.     TIANNA Ayon  [facility] [place]     Transcribed from ambient dictation for TIANNA Reilly by Salena Perez.  01/16/23   08:51 EST    Patient or patient representative verbalized consent to the visit recording.  I have personally performed the services described in this document as transcribed by the above individual, and it is both accurate and complete.

## 2023-01-16 ENCOUNTER — LAB (OUTPATIENT)
Dept: LAB | Facility: HOSPITAL | Age: 63
End: 2023-01-16
Payer: COMMERCIAL

## 2023-01-16 DIAGNOSIS — E11.59 TYPE 2 DIABETES MELLITUS WITH OTHER CIRCULATORY COMPLICATION, WITHOUT LONG-TERM CURRENT USE OF INSULIN: ICD-10-CM

## 2023-01-16 DIAGNOSIS — Z12.5 PROSTATE CANCER SCREENING: ICD-10-CM

## 2023-01-16 DIAGNOSIS — D50.9 MICROCYTIC ANEMIA: ICD-10-CM

## 2023-01-16 DIAGNOSIS — Z00.00 ANNUAL PHYSICAL EXAM: ICD-10-CM

## 2023-01-16 LAB
ALBUMIN SERPL-MCNC: 4.3 G/DL (ref 3.5–5.2)
ALBUMIN UR-MCNC: <1.2 MG/DL
ALBUMIN/GLOB SERPL: 1.4 G/DL
ALP SERPL-CCNC: 135 U/L (ref 39–117)
ALT SERPL W P-5'-P-CCNC: 28 U/L (ref 1–41)
ANION GAP SERPL CALCULATED.3IONS-SCNC: 7.8 MMOL/L (ref 5–15)
AST SERPL-CCNC: 29 U/L (ref 1–40)
BASOPHILS # BLD AUTO: 0.03 10*3/MM3 (ref 0–0.2)
BASOPHILS NFR BLD AUTO: 0.6 % (ref 0–1.5)
BILIRUB SERPL-MCNC: 0.3 MG/DL (ref 0–1.2)
BUN SERPL-MCNC: 14 MG/DL (ref 8–23)
BUN/CREAT SERPL: 15.1 (ref 7–25)
CALCIUM SPEC-SCNC: 9.5 MG/DL (ref 8.6–10.5)
CHLORIDE SERPL-SCNC: 103 MMOL/L (ref 98–107)
CHOLEST SERPL-MCNC: 203 MG/DL (ref 0–200)
CO2 SERPL-SCNC: 29.2 MMOL/L (ref 22–29)
CREAT SERPL-MCNC: 0.93 MG/DL (ref 0.76–1.27)
CREAT UR-MCNC: 47 MG/DL
DEPRECATED RDW RBC AUTO: 52.8 FL (ref 37–54)
EGFRCR SERPLBLD CKD-EPI 2021: 92.8 ML/MIN/1.73
EOSINOPHIL # BLD AUTO: 0.07 10*3/MM3 (ref 0–0.4)
EOSINOPHIL NFR BLD AUTO: 1.3 % (ref 0.3–6.2)
ERYTHROCYTE [DISTWIDTH] IN BLOOD BY AUTOMATED COUNT: 20.2 % (ref 12.3–15.4)
FERRITIN SERPL-MCNC: 73.8 NG/ML (ref 30–400)
FOLATE SERPL-MCNC: 10.5 NG/ML (ref 4.78–24.2)
GLOBULIN UR ELPH-MCNC: 3.1 GM/DL
GLUCOSE SERPL-MCNC: 113 MG/DL (ref 65–99)
HAPTOGLOB SERPL-MCNC: 242 MG/DL (ref 30–200)
HBA1C MFR BLD: 6.5 % (ref 4.8–5.6)
HCT VFR BLD AUTO: 35.8 % (ref 37.5–51)
HDLC SERPL-MCNC: 67 MG/DL (ref 40–60)
HGB BLD-MCNC: 11.1 G/DL (ref 13–17.7)
IRON 24H UR-MRATE: 70 MCG/DL (ref 59–158)
IRON SATN MFR SERPL: 17 % (ref 20–50)
LDH SERPL-CCNC: 198 U/L (ref 135–225)
LDLC SERPL CALC-MCNC: 118 MG/DL (ref 0–100)
LDLC/HDLC SERPL: 1.72 {RATIO}
LYMPHOCYTES # BLD AUTO: 2.07 10*3/MM3 (ref 0.7–3.1)
LYMPHOCYTES NFR BLD AUTO: 39.4 % (ref 19.6–45.3)
MCH RBC QN AUTO: 23 PG (ref 26.6–33)
MCHC RBC AUTO-ENTMCNC: 31 G/DL (ref 31.5–35.7)
MCV RBC AUTO: 74.1 FL (ref 79–97)
MICROALBUMIN/CREAT UR: NORMAL MG/G{CREAT}
MONOCYTES # BLD AUTO: 0.66 10*3/MM3 (ref 0.1–0.9)
MONOCYTES NFR BLD AUTO: 12.6 % (ref 5–12)
NEUTROPHILS NFR BLD AUTO: 2.4 10*3/MM3 (ref 1.7–7)
NEUTROPHILS NFR BLD AUTO: 45.7 % (ref 42.7–76)
PLATELET # BLD AUTO: 298 10*3/MM3 (ref 140–450)
PMV BLD AUTO: 9.6 FL (ref 6–12)
POTASSIUM SERPL-SCNC: 4.4 MMOL/L (ref 3.5–5.2)
PROT SERPL-MCNC: 7.4 G/DL (ref 6–8.5)
PSA SERPL-MCNC: 1.5 NG/ML (ref 0–4)
RBC # BLD AUTO: 4.83 10*6/MM3 (ref 4.14–5.8)
RETICS # AUTO: 0.08 10*6/MM3 (ref 0.02–0.13)
RETICS/RBC NFR AUTO: 1.66 % (ref 0.7–1.9)
SODIUM SERPL-SCNC: 140 MMOL/L (ref 136–145)
TIBC SERPL-MCNC: 413 MCG/DL (ref 298–536)
TRANSFERRIN SERPL-MCNC: 277 MG/DL (ref 200–360)
TRIGL SERPL-MCNC: 104 MG/DL (ref 0–150)
TSH SERPL DL<=0.05 MIU/L-ACNC: 1.36 UIU/ML (ref 0.27–4.2)
VIT B12 BLD-MCNC: 403 PG/ML (ref 211–946)
VLDLC SERPL-MCNC: 18 MG/DL (ref 5–40)
WBC NRBC COR # BLD: 5.25 10*3/MM3 (ref 3.4–10.8)

## 2023-01-16 PROCEDURE — 82728 ASSAY OF FERRITIN: CPT

## 2023-01-16 PROCEDURE — 84466 ASSAY OF TRANSFERRIN: CPT

## 2023-01-16 PROCEDURE — 82607 VITAMIN B-12: CPT

## 2023-01-16 PROCEDURE — 82570 ASSAY OF URINE CREATININE: CPT | Performed by: NURSE PRACTITIONER

## 2023-01-16 PROCEDURE — 80050 GENERAL HEALTH PANEL: CPT

## 2023-01-16 PROCEDURE — 83540 ASSAY OF IRON: CPT

## 2023-01-16 PROCEDURE — 36415 COLL VENOUS BLD VENIPUNCTURE: CPT

## 2023-01-16 PROCEDURE — 83615 LACTATE (LD) (LDH) ENZYME: CPT

## 2023-01-16 PROCEDURE — 83010 ASSAY OF HAPTOGLOBIN QUANT: CPT

## 2023-01-16 PROCEDURE — 85045 AUTOMATED RETICULOCYTE COUNT: CPT

## 2023-01-16 PROCEDURE — G0103 PSA SCREENING: HCPCS

## 2023-01-16 PROCEDURE — 82043 UR ALBUMIN QUANTITATIVE: CPT | Performed by: NURSE PRACTITIONER

## 2023-01-16 PROCEDURE — 82746 ASSAY OF FOLIC ACID SERUM: CPT

## 2023-01-16 PROCEDURE — 83036 HEMOGLOBIN GLYCOSYLATED A1C: CPT

## 2023-01-16 PROCEDURE — 80061 LIPID PANEL: CPT

## 2023-01-18 DIAGNOSIS — M54.50 BILATERAL LOW BACK PAIN WITHOUT SCIATICA, UNSPECIFIED CHRONICITY: Primary | ICD-10-CM

## 2023-01-18 DIAGNOSIS — M79.18 MUSCULOSKELETAL PAIN: ICD-10-CM

## 2023-01-18 NOTE — PROGRESS NOTES
Male Physical Note      Date: 2023   Patient Name: Kenn Hawthorne  : 1960   MRN: [id number]     Chief Complaint:    Chief Complaint   Patient presents with   • Annual Exam     Pt c/o flank pain on both sides, not improving since last visit   • Flank Pain       History of Present Illness: Kenn Hawthorne is a 62 y.o. male who is here today for their annual health maintenance and physical.  He has again c/o acute flank pain.      Health maintenance  Has been admitted to the hospital in 2022 and a second time in 2022 for an embolism.  Goes to an ophthalmologist and dentist regularly.   Notes his hearing seems off.  Feels his mood has been pretty good.  Immunizations: due zoster, PCV, Tdap  Has a generally healthy diet, diabetic friendly  Gets regular exercise, both walking and running ~5d/week-- limited lately due to ongoing side pain   States he has some pain when going up the stairs.   Also has pain when doing jumping jacks.   Drinks a lot of water.  Denies smoking   Drinks a small amount of alcohol.  Consumes CBD gummies.  Last colonoscopy was in 2022, by Dr. Coats which is when his cancer was initially found   He normally has a colonoscopy every 2 years due to a history of polyps.   Saw his ophthalmologist, Dr. Garcia approximately 1 weeks ago.     Medical history  Has a history of glaucoma.  Notes he has loss of hearing that is worst in one ear.     Bilateral flank pain  Notes he felt pain when going up the stairs.  Had an MRI and after his pain seemed to resolve.  Then he noticed pain when bending down.   Reports increased pain when laying on his back in bed.  Also has pain with sneezing.   States his pain feel more muscular.  Confirms the injection he received last week helped resolve his pain.   Has not hear anything back from an oncologist so he will call on Monday, 2023.    Medication  States he takes Everolimus every other day, instead of daily  States he noticed a change in  his pain level since making this change  Denies needing any refills on his medication.  Has a history of vitamin D deficiency.  Confirms the use of a vitamin D supplement.  Denies the use of a multivitamin.   Confirms the use of metformin and tamsulosin.   Notes his nocturia has improved with he use of tamsulosin.     Diabetes  States his blood glucose has been good.   Notes he went from the 200 mg/dL range to the 130 mg/dL range.    Blood pressure  His blood pressure is within normal range today.   He is requesting a recommendation for a blood pressure cuff.    Anemia  Notes he was having low iron issues prior to his cancer diagnosis.  Also has joint pain secondary to arthritis.  Met with a hem/ oncologist in the past due to low iron levels.  Denies taking oral iron.   Had an iron infusion in 07/2022.        Subjective      Review of Systems:   Review of Systems   Constitutional: Positive for activity change.   HENT: Negative.    Respiratory: Negative for cough, shortness of breath and wheezing.    Cardiovascular: Negative for chest pain.   Gastrointestinal: Negative.    Endocrine: Negative for polydipsia, polyphagia and polyuria.   Musculoskeletal: Positive for myalgias.   Psychiatric/Behavioral: Negative for sleep disturbance and depressed mood. The patient is not nervous/anxious.        Past Medical History, Social History, Family History and Care Team were all reviewed with patient and updated as appropriate.     Medications:     Current Outpatient Medications:   •  Allergy Relief 180 MG tablet, Take 180 mg by mouth Daily., Disp: , Rfl:   •  atorvastatin (Lipitor) 20 MG tablet, Take 1 tablet by mouth Daily., Disp: 90 tablet, Rfl: 1  •  Blood Glucose Monitoring Suppl w/Device kit, 1 kit 4 (Four) Times a Day Before Meals & at Bedtime., Disp: 1 each, Rfl: 0  •  clobetasol (TEMOVATE) 0.05 % ointment, APPLY TO AFFECTED AREA TWICE DAILY X 2 WEEKS, Disp: , Rfl:   •  COMBIGAN 0.2-0.5 % ophthalmic solution, instill 1  drop into both eyes twice a day, Disp: , Rfl: 0  •  Continuous Blood Gluc Sensor (Dexcom G6 Sensor), Every 10 (Ten) Days., Disp: 1 each, Rfl: 0  •  cyclobenzaprine (FLEXERIL) 10 MG tablet, Take 1 tablet by mouth 3 (Three) Times a Day As Needed for Muscle Spasms., Disp: 21 tablet, Rfl: 0  •  Dupilumab (Dupixent) 300 MG/2ML solution prefilled syringe, every 14 (fourteen) days., Disp: , Rfl:   •  everolimus (AFINITOR) 10 MG tablet, 1 tablet every other day. Swallow whole with a glass of water.  Do not take any tablet that is broken or crushed., Disp: , Rfl:   •  febuxostat (ULORIC) 80 MG tablet tablet, TAKE 1 TABLET BY MOUTH EVERY DAY, Disp: , Rfl:   •  glucose blood test strip, Use as instructed QID before meals and at bedtime, Disp: 100 each, Rfl: 3  •  Lancets (ACCU-CHEK MULTICLIX) lancets, Use as instructed QID before meals and at bedtime, Disp: 1 each, Rfl: 0  •  lisinopril (PRINIVIL,ZESTRIL) 20 MG tablet, Take 1 tablet by mouth Daily., Disp: 90 tablet, Rfl: 1  •  Lumigan 0.01 % ophthalmic drops, Administer 1 drop to both eyes Every Night., Disp: , Rfl:   •  metFORMIN ER (GLUCOPHAGE-XR) 500 MG 24 hr tablet, Take 2 tablets by mouth 2 (Two) Times a Day., Disp: 360 tablet, Rfl: 1  •  tamsulosin (FLOMAX) 0.4 MG capsule 24 hr capsule, Take 1 capsule by mouth Daily., Disp: 90 capsule, Rfl: 1  •  vitamin D (ERGOCALCIFEROL) 1.25 MG (70814 UT) capsule capsule, Take 50,000 Units by mouth 1 (One) Time Per Week., Disp: , Rfl:   •  Vitamin D, Cholecalciferol, (CHOLECALCIFEROL) 10 MCG (400 UNIT) tablet, Take 400 Units by mouth Daily., Disp: , Rfl:   •  Xiidra 5 % ophthalmic solution, , Disp: , Rfl:   •  ferrous sulfate (FerrouSul) 325 (65 FE) MG tablet, Take 1 tablet by mouth Daily With Breakfast., Disp: 30 tablet, Rfl: 2    Allergies:   Allergies   Allergen Reactions   • Latex Rash   • Penicillins Rash       Immunizations:  Td/Tdap(Booster Q 10 yrs):    Flu (Yearly):   Pneumovax (1 yr after Prevnar):    [id number] (1 yr after  "Pneumo):   Colorectal Screening:     Last Completed Colonoscopy          COLORECTAL CANCER SCREENING (COLONOSCOPY - Yearly) Next due on 2022  SCANNED - COLONOSCOPY    2011  COLONOSCOPY (Done)               CT for Smoker (Age 55-75, 30pk yr):    Bone Density/DEXA:   Hep C ( 4599-4747):    PSA(Over age 50):    US Aorta (For male smokers, age 65):      Diet/Physical activity    Sexual health: none     PHQ-2 Depression Screening  Little interest or pleasure in doing things? 0-->not at all   Feeling down, depressed, or hopeless? 0-->not at all   PHQ-2 Total Score 0         Intimate partner violence: (Screen on initial visit, older adult with injury or evidence of neglect):  • Violence can be a problem in many people's lives, so I now ask every patient about trauma or abuse they may have experienced in a relationship.  • Stress/Safety - Do you feel safe in your relationship?  • Afraid/Abused - Have you ever been in a relationship where you were threatened, hurt, or afraid?  • Friend/Family - Are your friends aware you have been hurt?  • Emergency Plan - Do you have a safe place to go and the resources you need in an emergency?    Osteoporosis:   • Men: history of low trauma fracture, androgen deprivation therapy for prostate cancer, hypogonadism, primary hyperparathyroidism, intestinal disorders.     Objective     Physical Exam:  Vital Signs:   Vitals:    23 0943   BP: 104/70   Pulse: 68   Resp: 16   Temp: 97.2 °F (36.2 °C)   SpO2: 98%   Weight: 73 kg (161 lb)   Height: 165.1 cm (65\")   PainSc:   9     Body mass index is 26.79 kg/m².     Physical Exam  Vitals and nursing note reviewed.   Constitutional:       General: He is not in acute distress.     Appearance: Normal appearance. He is well-developed and normal weight. He is not ill-appearing.   HENT:      Head: Normocephalic and atraumatic.      Right Ear: Tympanic membrane, ear canal and external ear normal.      Left Ear: Tympanic " membrane, ear canal and external ear normal.      Nose: Nose normal.      Mouth/Throat:      Mouth: Mucous membranes are moist.      Pharynx: Oropharynx is clear. No oropharyngeal exudate or posterior oropharyngeal erythema.   Eyes:      Extraocular Movements: Extraocular movements intact.      Conjunctiva/sclera: Conjunctivae normal.      Pupils: Pupils are equal, round, and reactive to light.   Neck:      Trachea: No tracheal deviation.   Cardiovascular:      Rate and Rhythm: Normal rate and regular rhythm.      Heart sounds: Normal heart sounds.   Pulmonary:      Effort: Pulmonary effort is normal. No respiratory distress.      Breath sounds: Normal breath sounds.   Abdominal:      General: Bowel sounds are normal. There is no distension.      Palpations: Abdomen is soft.      Tenderness: There is no abdominal tenderness. There is no right CVA tenderness, left CVA tenderness, guarding or rebound.   Musculoskeletal:        Arms:       Cervical back: Neck supple.      Right lower leg: No edema.      Left lower leg: No edema.      Comments: The pain he is describing is located at bottom edge of rib cage and just below. It is not reproducible with palpation.    Lymphadenopathy:      Cervical: No cervical adenopathy.   Skin:     General: Skin is warm and dry.      Capillary Refill: Capillary refill takes less than 2 seconds.   Neurological:      General: No focal deficit present.      Mental Status: He is alert and oriented to person, place, and time. Mental status is at baseline.      Cranial Nerves: No cranial nerve deficit.      Motor: No weakness.      Coordination: Coordination normal.      Gait: Gait normal.   Psychiatric:         Mood and Affect: Mood normal.         Behavior: Behavior normal.         Thought Content: Thought content normal.         Judgment: Judgment normal.             Assessment / Plan      Assessment/Plan:   Diagnoses and all orders for this visit:    1. Microcytic anemia   -     ferrous  sulfate (FerrouSul) 325 (65 FE) MG tablet; Take 1 tablet by mouth Daily With Breakfast.  Dispense: 30 tablet; Refill: 2    2. Annual physical exam        -     He was advised to get an over the counter men's 50 and up multivitamin.         -     Labs ordered today, pt will return Monday for collection   -immunizations discussed, he will speak with his oncologist about best time to receive   -colonoscopy- rec 1 yr, due 2/2023 Dr Coats     3. Type 2 diabetes mellitus with other circulatory complication, without long-term current use of insulin (HCC)        -     His A1C will be re-checked in 12 weeks.   -continue current tx plan     4. Essential hypertension        -     He was advised to buy a blood pressure cuff from his local [employer] or [employer].     5. Vitamin D deficiency        - notable low on recent labs checked by rosetteu     - His vitamin D levels will be checked in 12 weeks.    6. Bilateral low back pain without sciatica, unspecified chronicity      7. Benign localized prostatic hyperplasia with lower urinary tract symptoms (LUTS)      8. 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        -     He will be given iron supplements.     10.Loss of hearing         -     He will be referred to audiology for a hearing test.          Follow Up:   Return in about 4 weeks (around 2/11/2023) for Recheck.  -f/u 4 weeks: anemia  -f/u 3m: DM recheck   -f/u 1yr: ELANIA Hawthorne voices understanding and acceptance of this advice and will call back with any further questions or concerns. AVS with preventive healthcare tips printed for patient.     TIANNA Ayon  [facility] [place]     Transcribed from ambient dictation for TIANNA Reilly by Salena Perez.  01/16/23   08:51 EST    Patient or patient representative  verbalized consent to the visit recording.  I have personally performed the services described in this document as transcribed by the above individual, and it is both accurate and complete.

## 2023-01-26 ENCOUNTER — TELEPHONE (OUTPATIENT)
Dept: PHYSICAL THERAPY | Facility: OTHER | Age: 63
End: 2023-01-26
Payer: COMMERCIAL

## 2023-01-26 NOTE — TELEPHONE ENCOUNTER
Caller: Kenn Hawthorne    Relationship: Self    What was the call regarding: PATIENT CANCELLED APPT TODAY BECAUSE THEY CANT MAKE IT

## 2023-02-07 ENCOUNTER — TELEPHONE (OUTPATIENT)
Dept: PHYSICAL THERAPY | Facility: CLINIC | Age: 63
End: 2023-02-07

## 2023-02-07 NOTE — TELEPHONE ENCOUNTER
Caller: Kenn Hawthorne    Relationship: Self     What was the call regarding: NOT FEELING WELL HAS A TEMP

## 2023-03-06 DIAGNOSIS — I10 ESSENTIAL HYPERTENSION: ICD-10-CM

## 2023-03-06 NOTE — TELEPHONE ENCOUNTER
Caller: Kenn Hwathorne    Relationship: Self    Best call back number:  [phone]     Requested Prescriptions:   Requested Prescriptions     Pending Prescriptions Disp Refills   • lisinopril (PRINIVIL,ZESTRIL) 20 MG tablet 90 tablet 1     Sig: Take 1 tablet by mouth Daily.        Pharmacy where request should be sent: [facility] [id number] [address]  AT [facility] [address] - [phone]  - [phone] FX     Additional details provided by patient:       Does the patient have less than a 3 day supply:  [x] Yes  [] No    Would you like a call back once the refill request has been completed: [] Yes [x] No    If the office needs to give you a call back, can they leave a voicemail: [] Yes [x] No

## 2023-03-07 RX ORDER — LISINOPRIL 20 MG/1
20 TABLET ORAL DAILY
Qty: 90 TABLET | Refills: 0 | Status: SHIPPED | OUTPATIENT
Start: 2023-03-07

## 2023-04-07 ENCOUNTER — OFFICE VISIT (OUTPATIENT)
Dept: INTERNAL MEDICINE | Facility: CLINIC | Age: 63
End: 2023-04-07
Payer: COMMERCIAL

## 2023-04-07 VITALS
RESPIRATION RATE: 16 BRPM | TEMPERATURE: 97.5 F | SYSTOLIC BLOOD PRESSURE: 102 MMHG | BODY MASS INDEX: 26.33 KG/M2 | WEIGHT: 158 LBS | OXYGEN SATURATION: 95 % | DIASTOLIC BLOOD PRESSURE: 66 MMHG | HEIGHT: 65 IN | HEART RATE: 88 BPM

## 2023-04-07 DIAGNOSIS — C7B.8 NEUROENDOCRINE CARCINOMA METASTATIC TO MULTIPLE SITES: Chronic | ICD-10-CM

## 2023-04-07 DIAGNOSIS — D50.9 MICROCYTIC ANEMIA: ICD-10-CM

## 2023-04-07 DIAGNOSIS — E11.59 TYPE 2 DIABETES MELLITUS WITH OTHER CIRCULATORY COMPLICATION, WITHOUT LONG-TERM CURRENT USE OF INSULIN: Primary | ICD-10-CM

## 2023-04-07 DIAGNOSIS — C7A.8 NEUROENDOCRINE CARCINOMA METASTATIC TO MULTIPLE SITES: Chronic | ICD-10-CM

## 2023-04-07 LAB
EXPIRATION DATE: ABNORMAL
HBA1C MFR BLD: 7.9 %
Lab: ABNORMAL

## 2023-04-07 RX ORDER — METFORMIN HYDROCHLORIDE 500 MG/1
1000 TABLET, EXTENDED RELEASE ORAL 2 TIMES DAILY
Qty: 360 TABLET | Refills: 1 | Status: SHIPPED | OUTPATIENT
Start: 2023-04-07

## 2023-04-07 RX ORDER — CAPECITABINE 500 MG/1
TABLET, FILM COATED ORAL
COMMUNITY
Start: 2023-04-05 | End: 2023-04-19

## 2023-04-07 RX ORDER — TEMOZOLOMIDE 140 MG/1
140 CAPSULE ORAL
COMMUNITY
Start: 2023-04-05

## 2023-04-07 RX ORDER — ONDANSETRON 8 MG/1
TABLET, ORALLY DISINTEGRATING ORAL
COMMUNITY
Start: 2023-04-06

## 2023-04-07 RX ORDER — OXYCODONE HYDROCHLORIDE 5 MG/1
5 TABLET ORAL
COMMUNITY
Start: 2023-04-01 | End: 2023-05-01

## 2023-04-07 NOTE — PROGRESS NOTES
Follow Up Office Visit      Date: 2023   Patient Name: Kenn Hawthorne  : 1960   MRN: [id number]     Chief Complaint:    Chief Complaint   Patient presents with   • Hypertension   • Diabetes       History of Present Illness: Kenn Hawthorne is a 62 y.o. male who is here today to follow up with DM2 and anemia. Unfortunately, since his last visit restaging scans indicate ds progression of metastatic neuroendocrine carcinoma. His everolimus was d/c'd ~2.5 weeks ago, with plans to begin new oral chemotherapy in the next couple days with temodar and xeloda. He previously c/o pain in mid back which is now attributable to new thoracic  bones lesions. He inquires about information regarding AD, living will, and POA. States that he has been having end of life discussions with his sister, which is understandably difficult but he wishes to be prepared. He continues PO iron supplementation, noting some mild GI s/e since starting ferrous sulfate.       Subjective      Review of Systems:   Review of Systems   Constitutional: Positive for fatigue (very mild ).   Cardiovascular: Negative.    Gastrointestinal: Positive for constipation (since starting PO iron).   Endocrine: Positive for polydipsia. Negative for polyphagia and polyuria.   Musculoskeletal:        Back pain    Neurological: Negative for weakness.       I have reviewed the patients family history, social history, past medical history, past surgical history and have updated it as appropriate.     Medications:     Current Outpatient Medications:   •  Allergy Relief 180 MG tablet, Take 1 tablet by mouth Daily., Disp: , Rfl:   •  atorvastatin (Lipitor) 20 MG tablet, Take 1 tablet by mouth Daily., Disp: 90 tablet, Rfl: 1  •  Blood Glucose Monitoring Suppl w/Device kit, 1 kit 4 (Four) Times a Day Before Meals & at Bedtime., Disp: 1 each, Rfl: 0  •  capecitabine (XELODA) 500 MG chemo tablet, Take 3 tabs (1500 mg) by mouth in the morning, 2 tabs (1000 mg) by  mouth in the evening for 14 days of a 28 day cycle, Disp: , Rfl:   •  clobetasol (TEMOVATE) 0.05 % ointment, APPLY TO AFFECTED AREA TWICE DAILY X 2 WEEKS, Disp: , Rfl:   •  COMBIGAN 0.2-0.5 % ophthalmic solution, instill 1 drop into both eyes twice a day, Disp: , Rfl: 0  •  Continuous Blood Gluc Sensor (Dexcom G6 Sensor), Every 10 (Ten) Days., Disp: 1 each, Rfl: 0  •  cyclobenzaprine (FLEXERIL) 10 MG tablet, Take 1 tablet by mouth 3 (Three) Times a Day As Needed for Muscle Spasms., Disp: 21 tablet, Rfl: 0  •  Dupilumab (Dupixent) 300 MG/2ML solution prefilled syringe, every 14 (fourteen) days., Disp: , Rfl:   •  everolimus (AFINITOR) 10 MG tablet, 1 tablet every other day. Swallow whole with a glass of water.  Do not take any tablet that is broken or crushed., Disp: , Rfl:   •  febuxostat (ULORIC) 80 MG tablet tablet, TAKE 1 TABLET BY MOUTH EVERY DAY, Disp: , Rfl:   •  ferrous sulfate (FerrouSul) 325 (65 FE) MG tablet, Take 1 tablet by mouth Daily With Breakfast., Disp: 30 tablet, Rfl: 2  •  glucose blood test strip, Use as instructed QID before meals and at bedtime, Disp: 100 each, Rfl: 3  •  Lancets (ACCU-CHEK MULTICLIX) lancets, Use as instructed QID before meals and at bedtime, Disp: 1 each, Rfl: 0  •  lisinopril (PRINIVIL,ZESTRIL) 20 MG tablet, Take 1 tablet by mouth Daily., Disp: 90 tablet, Rfl: 0  •  Lumigan 0.01 % ophthalmic drops, Administer 1 drop to both eyes Every Night., Disp: , Rfl:   •  metFORMIN ER (GLUCOPHAGE-XR) 500 MG 24 hr tablet, Take 2 tablets by mouth 2 (Two) Times a Day., Disp: 360 tablet, Rfl: 1  •  ondansetron ODT (ZOFRAN-ODT) 8 MG disintegrating tablet, Take 1 tablet orally 30 minutes before Temozolomide and then up to every 8 hours as needed for nausea/vomiting (Max: 3/day), Disp: , Rfl:   •  oxyCODONE (ROXICODONE) 5 MG immediate release tablet, Take 1 tablet by mouth., Disp: , Rfl:   •  tamsulosin (FLOMAX) 0.4 MG capsule 24 hr capsule, Take 1 capsule by mouth Daily., Disp: 90 capsule,  "Rfl: 1  •  temozolomide (TEMODAR) 140 MG chemo capsule, Take 1 capsule by mouth., Disp: , Rfl:   •  vitamin D (ERGOCALCIFEROL) 1.25 MG (51205 UT) capsule capsule, Take 1 capsule by mouth 1 (One) Time Per Week., Disp: , Rfl:   •  Vitamin D, Cholecalciferol, (CHOLECALCIFEROL) 10 MCG (400 UNIT) tablet, Take 1 tablet by mouth Daily., Disp: , Rfl:   •  Xiidra 5 % ophthalmic solution, , Disp: , Rfl:     Allergies:   Allergies   Allergen Reactions   • Latex Rash   • Penicillins Rash       Objective     Physical Exam: Please see above  Vital Signs:   Vitals:    04/07/23 1028   BP: 102/66   Pulse: 88   Resp: 16   Temp: 97.5 °F (36.4 °C)   SpO2: 95%   Weight: 71.7 kg (158 lb)   Height: 165.1 cm (65\")   PainSc:   4     Body mass index is 26.29 kg/m².    Physical Exam  Vitals and nursing note reviewed.   Constitutional:       General: He is not in acute distress.     Appearance: Normal appearance.   HENT:      Head: Normocephalic and atraumatic.   Cardiovascular:      Rate and Rhythm: Normal rate and regular rhythm.      Heart sounds: Normal heart sounds.   Pulmonary:      Effort: Pulmonary effort is normal. No respiratory distress.      Breath sounds: Normal breath sounds.   Skin:     General: Skin is warm and dry.   Neurological:      Mental Status: He is alert and oriented to person, place, and time. Mental status is at baseline.   Psychiatric:         Mood and Affect: Mood normal.         Behavior: Behavior normal.         Thought Content: Thought content normal.         Judgment: Judgment normal.           Results:   Imaging:     Labs:    Latest Reference Range & Units 01/16/23 14:11    - 225 U/L 198   Glucose 65 - 99 mg/dL 113 (H)   Sodium 136 - 145 mmol/L 140   Potassium 3.5 - 5.2 mmol/L 4.4   CO2 22.0 - 29.0 mmol/L 29.2 (H)   Chloride 98 - 107 mmol/L 103   Anion Gap 5.0 - 15.0 mmol/L 7.8   Creatinine 0.76 - 1.27 mg/dL 0.93   BUN 8 - 23 mg/dL 14   BUN/Creatinine Ratio 7.0 - 25.0  15.1   Calcium 8.6 - 10.5 mg/dL " 9.5   eGFR >60.0 mL/min/1.73 92.8   Alkaline Phosphatase 39 - 117 U/L 135 (H)   Total Protein 6.0 - 8.5 g/dL 7.4   ALT (SGPT) 1 - 41 U/L 28   AST (SGOT) 1 - 40 U/L 29   Total Bilirubin 0.0 - 1.2 mg/dL 0.3   Albumin 3.5 - 5.2 g/dL 4.3   Globulin gm/dL 3.1   A/G Ratio g/dL 1.4   Hemoglobin A1C 4.80 - 5.60 % 6.50 (H)   TSH Baseline 0.270 - 4.200 uIU/mL 1.360   Iron 59 - 158 mcg/dL 70   Ferritin 30.00 - 400.00 ng/mL 73.80   Iron Saturation 20 - 50 % 17 (L)   Transferrin 200 - 360 mg/dL 277   TIBC 298 - 536 mcg/dL 413   Folate 4.78 - 24.20 ng/mL 10.50   Total Cholesterol 0 - 200 mg/dL 203 (H)   HDL Cholesterol 40 - 60 mg/dL 67 (H)   LDL Cholesterol  0 - 100 mg/dL 118 (H)   VLDL Cholesterol 5 - 40 mg/dL 18   Triglycerides 0 - 150 mg/dL 104   LDL/HDL Ratio  1.72   Vitamin B-12 211 - 946 pg/mL 403   Haptoglobin 30 - 200 mg/dL 242 (H)   WBC 3.40 - 10.80 10*3/mm3 5.25   RBC 4.14 - 5.80 10*6/mm3 4.83   Hemoglobin 13.0 - 17.7 g/dL 11.1 (L)   Hematocrit 37.5 - 51.0 % 35.8 (L)   RDW 12.3 - 15.4 % 20.2 (H)   MCV 79.0 - 97.0 fL 74.1 (L)   MCH 26.6 - 33.0 pg 23.0 (L)   MCHC 31.5 - 35.7 g/dL 31.0 (L)   MPV 6.0 - 12.0 fL 9.6   Platelets 140 - 450 10*3/mm3 298   RDW-SD 37.0 - 54.0 fl 52.8   Neutrophil Rel % 42.7 - 76.0 % 45.7   Lymphocyte Rel % 19.6 - 45.3 % 39.4   Monocyte Rel % 5.0 - 12.0 % 12.6 (H)   Eosinophil Rel % 0.3 - 6.2 % 1.3   Basophil Rel % 0.0 - 1.5 % 0.6   Reticulocyte % 0.70 - 1.90 % 1.66   Reticulocyte Absolute 0.0200 - 0.1300 10*6/mm3 0.0802   Neutrophils Absolute 1.70 - 7.00 10*3/mm3 2.40   Lymphocytes Absolute 0.70 - 3.10 10*3/mm3 2.07   Monocytes Absolute 0.10 - 0.90 10*3/mm3 0.66   Eosinophils Absolute 0.00 - 0.40 10*3/mm3 0.07   Basophils Absolute 0.00 - 0.20 10*3/mm3 0.03   PSA 0.000 - 4.000 ng/mL 1.500   Creatinine, Urine mg/dL 47.0   Microalbumin, Urine mg/dL <1.2   Microalbumin/Creatinine Ratio  See Comment   (H): Data is abnormally high  (L): Data is abnormally low    Assessment / Plan      Assessment/Plan:    Diagnoses and all orders for this visit:    Type 2 diabetes mellitus with other circulatory complication, without long-term current use of insulin  -A1C increased from prior 7.9% (prev 6.5)  -continue metformin as prescribed, along with diabetic diet and regular exercise. He req rf.   -his previous oral chemotherapy is known to cause increase in blood sugar and this treatment was recently d/c due to disease progression. Considering he is about to start 2 new meds (oral chemo) and potential for improved glucose levels off everolimus, we will hold on pharmacologic tx changes for dm2 at this time. He is at max dosing of metformin, so additional med would be indicated as next step in tx. I do not want to start yet anther med if not absolutely necessary in case he has a rxn, for easier identification of causative med   -     POC Glycosylated Hemoglobin (Hb A1C)  -     metFORMIN ER (GLUCOPHAGE-XR) 500 MG 24 hr tablet; Take 2 tablets by mouth 2 (Two) Times a Day.  Dispense: 360 tablet; Refill: 1    Microcytic anemia  -Continue PO iron supplement, but decrease to every other day dosing   -I again encouraged him to discuss DILMA with Dr Marquez and I will of course defer recommendation/ mgmt to him     Neuroendocrine carcinoma metastatic to multiple sites  -recent disease progression  -discussed specific concerns Adonay has regarding end of life arrangements and supportive sx mgmt which I will defer to hem/onc service   -provided printed info on advance directive, living will, and POA        Follow Up:   Return in about 3 months (around 7/7/2023) for Recheck.  -DM2     TIANNA Ayon  [facility] [place]

## 2023-06-02 DIAGNOSIS — I10 ESSENTIAL HYPERTENSION: ICD-10-CM

## 2023-06-02 RX ORDER — LISINOPRIL 20 MG/1
TABLET ORAL
Qty: 90 TABLET | Refills: 0 | Status: SHIPPED | OUTPATIENT
Start: 2023-06-02

## 2023-07-27 ENCOUNTER — DOCUMENTATION (OUTPATIENT)
Dept: NUTRITION | Facility: HOSPITAL | Age: 63
End: 2023-07-27
Payer: COMMERCIAL

## 2023-08-01 ENCOUNTER — DOCUMENTATION (OUTPATIENT)
Dept: NUTRITION | Facility: HOSPITAL | Age: 63
End: 2023-08-01
Payer: COMMERCIAL

## 2023-08-29 ENCOUNTER — READMISSION MANAGEMENT (OUTPATIENT)
Dept: CALL CENTER | Facility: HOSPITAL | Age: 63
End: 2023-08-29
Payer: COMMERCIAL

## 2023-08-29 NOTE — OUTREACH NOTE
Prep Survey      Flowsheet Row Responses   [facility] patient discharged from? Non-[facility]   Is LACE score < 7 ? Non-BH Discharge   Eligibility [facility]   Date of Admission 08/16/23   Date of Discharge 08/28/23   Discharge diagnosis hypotension   Does the patient have one of the following disease processes/diagnoses(primary or secondary)? Other   Prep survey completed? Yes            Susanna LANDA - Registered Nurse

## 2023-08-30 ENCOUNTER — TRANSITIONAL CARE MANAGEMENT TELEPHONE ENCOUNTER (OUTPATIENT)
Dept: CALL CENTER | Facility: HOSPITAL | Age: 63
End: 2023-08-30
Payer: COMMERCIAL

## 2023-08-30 NOTE — OUTREACH NOTE
Call Center TCM Note      Flowsheet Row Responses   [facility] patient discharged from? Non-  []   Does the patient have one of the following disease processes/diagnoses(primary or secondary)? Other   TCM attempt successful? Yes   Call start time 1147   Call end time 1148   Discharge diagnosis hypotension   Does the patient have all medications ordered at discharge? Yes   Is the patient taking all medications as directed (includes completed medication regime)? Yes   Does the patient have an appointment with their PCP within 7-14 days of discharge? No   Nursing Interventions Patient declined scheduling/rescheduling appointment at this time   Psychosocial issues? No   Did the patient receive a copy of their discharge instructions? Yes   What is the patient's perception of their health status since discharge? Improving   Is the patient/caregiver able to teach back signs and symptoms related to disease process for when to call PCP? Yes   Is the patient/caregiver able to teach back signs and symptoms related to disease process for when to call 911? Yes   Is the patient/caregiver able to teach back the hierarchy of who to call/visit for symptoms/problems? PCP, Specialist, Home health nurse, Urgent Care, ED, 911 Yes   TCM call completed? Yes   Wrap up additional comments Doing well, no questions, states a transition nurse seen him today, declined to make a f/u appt with PCP during call.   Call end time 1148   Would this patient benefit from a Referral to Amb Social Work? No   Is the patient interested in additional calls from an ambulatory ? No            Socorro Kelly RN    8/30/2023, 11:48 EDT

## 2023-09-18 ENCOUNTER — TELEPHONE (OUTPATIENT)
Dept: INTERNAL MEDICINE | Facility: CLINIC | Age: 63
End: 2023-09-18
Payer: COMMERCIAL

## 2023-09-18 NOTE — TELEPHONE ENCOUNTER
[facility] CALLING FOR VERBAL ORDERS FOR START OF CARE INITIAL APPOINTMENT TO BEGIN PT & OT WITH PATIENT ONCE HE IS DISCHARGED FROM HOSPITAL.      PLEASE CALL ZENY BACK [phone]

## 2023-09-19 NOTE — TELEPHONE ENCOUNTER
Called [facility], stated that pt will not be able to go through them for wound care due to insurance coverage. Pt will be set up w/ another care

## 2023-09-20 ENCOUNTER — TRANSITIONAL CARE MANAGEMENT TELEPHONE ENCOUNTER (OUTPATIENT)
Dept: CALL CENTER | Facility: HOSPITAL | Age: 63
End: 2023-09-20
Payer: COMMERCIAL

## 2023-09-20 ENCOUNTER — READMISSION MANAGEMENT (OUTPATIENT)
Dept: CALL CENTER | Facility: HOSPITAL | Age: 63
End: 2023-09-20
Payer: COMMERCIAL

## 2023-09-20 NOTE — OUTREACH NOTE
Call Center TCM Note      Flowsheet Row Responses   [facility] patient discharged from? Non-   Does the patient have one of the following disease processes/diagnoses(primary or secondary)? Other   TCM attempt successful? Yes   Call start time 1526   Call end time 1528   Discharge diagnosis Hypoglycemia   Meds reviewed with patient/caregiver? Yes   Is the patient having any side effects they believe may be caused by any medication additions or changes? No   Does the patient have all medications ordered at discharge? Yes   Is the patient taking all medications as directed (includes completed medication regime)? Yes   Comments Hospital d/c follow 9/21/23@1700   Does the patient have an appointment with their PCP within 7-14 days of discharge? Yes   Has home health visited the patient within 72 hours of discharge? N/A   Psychosocial issues? No   Did the patient receive a copy of their discharge instructions? --  [Non-]   What is the patient's perception of their health status since discharge? Improving   TCM call completed? Yes   Call end time 1528   Would this patient benefit from a Referral to [facility] Social Work? No   Is the patient interested in additional calls from an ambulatory ? No            Kandy Parkinson RN    9/20/2023, 15:29 EDT

## 2023-09-20 NOTE — OUTREACH NOTE
Call Center TCM Note      Flowsheet Row Responses   [facility] [place] patient discharged from? Non-   Does the patient have one of the following disease processes/diagnoses(primary or secondary)? Other   TCM attempt successful? No   Unsuccessful attempts Attempt 1   Call Status Left message            Kandy Parkinson RN    9/20/2023, 10:13 EDT

## 2023-09-22 ENCOUNTER — TELEMEDICINE (OUTPATIENT)
Dept: INTERNAL MEDICINE | Facility: CLINIC | Age: 63
End: 2023-09-22
Payer: COMMERCIAL

## 2023-09-22 DIAGNOSIS — C7B.8 NEUROENDOCRINE CARCINOMA METASTATIC TO MULTIPLE SITES: ICD-10-CM

## 2023-09-22 DIAGNOSIS — E46 PROTEIN-CALORIE MALNUTRITION, UNSPECIFIED SEVERITY: ICD-10-CM

## 2023-09-22 DIAGNOSIS — R18.0 MALIGNANT ASCITES: ICD-10-CM

## 2023-09-22 DIAGNOSIS — C7A.8 NEUROENDOCRINE CARCINOMA METASTATIC TO MULTIPLE SITES: ICD-10-CM

## 2023-09-22 DIAGNOSIS — E11.649 TYPE 2 DIABETES MELLITUS WITH HYPOGLYCEMIA WITHOUT COMA, UNSPECIFIED WHETHER LONG TERM INSULIN USE: Primary | ICD-10-CM

## 2023-09-22 DIAGNOSIS — I95.89 OTHER SPECIFIED HYPOTENSION: ICD-10-CM

## 2023-09-22 DIAGNOSIS — E16.2 HYPOGLYCEMIA: ICD-10-CM

## 2023-09-22 DIAGNOSIS — Z76.89 ENCOUNTER FOR SUPPORT AND COORDINATION OF TRANSITION OF CARE: Primary | ICD-10-CM

## 2023-09-22 DIAGNOSIS — D50.9 MICROCYTIC ANEMIA: ICD-10-CM

## 2023-09-22 DIAGNOSIS — E16.1 HYPERINSULINEMIA: ICD-10-CM

## 2023-09-22 DIAGNOSIS — M79.89 LEG SWELLING: ICD-10-CM

## 2023-09-22 DIAGNOSIS — E87.8 ELECTROLYTE ABNORMALITY: ICD-10-CM

## 2023-09-22 NOTE — PROGRESS NOTES
Telehealth E-Visit      Patient Name: Kenn Hawthorne  : 1960   MRN: [id number]     Chief Complaint:    Chief Complaint   Patient presents with    Transitional Care Management       I have reviewed the E-Visit questionnaire and the patient's answers, my assessment and plan are listed below.     This provider is located at the [facility] (through [facility]), [address] [place] [address] using a secure [employer] Video Visit through [employer]. Patient is being seen remotely via telehealth at their home address in [place], and stated they are in a secure environment for this session. The patient's condition being diagnosed/treated is appropriate for telemedicine. The provider identified herself as well as her credentials. The patient, and/or patients guardian, consent to be seen remotely, and when consent is given they understand that the consent allows for patient identifiable information to be sent to a third party as needed. They may refuse to be seen remotely at any time. The electronic data is encrypted and password protected, and the patient and/or guardian has been advised of the potential risks to privacy not withstanding such measures.    You have chosen to receive care through a telehealth visit. Do you consent to use a video/audio connection for your medical care today? Yes    History of Present Illness: Kenn Hawthorne is a 63 y.o. male who is here today for TCM visit. Adonay has unfortunately had 2 hospital admissions since her last visit in July- both at .    Admission  -     Undifferentiated hypotension: Etiology initially suspected to be infectious versus obstructive secondary to tumor burden or hypovolemic secondary to poor p.o. intake.  He received 1 L of IVF in the ED and started on Levophed.  Echo: LVEF 60-65%.  Weaned off Levophed .  Cortisol level: 6.0. ACTH stim test done  showing rise to 19 at 60 min sagar which is not c/w  adrenal insufficiency. He was started on stress dose steroids after samples obtained but before resulted. While on these he remained hypotensive and required bolus. Discussed with endocrinology who did not think that this was consistent. Infectious workup negative.  On midodrine 10 mg TID.  Started on octreotide gtt and he improved; now on octreotide 150 mcg SQ TID.  Octreotide was continued at discharge.    Hyperkalemia: Potassium 6.6 on admission, 4.2 on discharge.    Primary rectal neuroendocrine tumor, liver and osseous metastases, neoplasm pain: Last chemo was 8/12 with cape/Tem.  Patient scheduled to start radiation therapy 8/21. CT A/P; with stable disease burden; no new or worsening progression noted.  PRN morphine. Bisacodyl PO and parameters for constipation.    AFSANEH (as above, hyper K): Improved, baseline ~1. IVF resuscitation PRN. Strict I&Os, avoid nephrotoxins.     BLE edema: This is new onset in the week prior to admission. BLE duplex negative for DVT. Encouraged OOBTC and ambulation as tolerated. Supportive care.  It was also noted-third spacing from fluid administration.  Continue SHON hose.    Anemia: Etiology likely secondary to cancer and chemo and hemodilution in setting of IVF boluses. Hgb 9 on admission.     Malnutrition: At least moderate given weight loss over months.  Nutrition consulted during admission and will follow him in oncology clinic as outpatient.    Admission 9/12 - 9/19  Presented to  on 9/12 with altered mental status in setting of severe hypoglycemia (glucose on arrival= 38) and abdominal ascites.    Hypoglycemia: Attributed to depleted hepatic stores and impaired gluconeogenesis in the setting of innumerable liver mets. W/u showed a fasting insulin level <3.0 and c-peptide  <0.05, effectively r/o insulinoma. PICC placed in left arm on 9/17.PICC care to be completed with [facility] health nursing staff on weekly basis. Discharged with continued D20 via PICC at 30mL/hr  through bioscripts, with continued oral glucose gel PRN and normal PO diet. Coordination for OP infusion mgmt and weekly lab monitoring was arranged with oncology office (Dr Marquez) and fellow Dr Rene Parker. Pt is at risk for hyponatremia and fluid overload and should be monitored closely. Thiamine supp added given risk of depletion on continuous dextrose. Glucometer provided with instructions to monitor fingerstick glucose twice daily and with sxs.     Ascites: No hx of cirrhosis, likely 2/2 diffuse hepatic metastatic disease. Pt's AMS resolved with hypoglycemic management, without further signs of encephalopathy. Laboratory studies show INR 1.8, PT 20.8, AST 57, ALT 27, AlkPhos 492. POCT Abdominal US did not reveal adequate window for bedside therapeutic paracentesis; procedure was not performed. Possibility of diuresis limited by risk of exacerbated hypotension, which requires midodrine and octreotide for normotension. Provided abdominal binder for mild symptomatic relief during hospitalization and upon discharge.    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 CT imaging at that time confirmed multiple liver mets w/ largest 4.1cm. Follows with Dr. Marquez hem/onc, last chemo was on 8/12 with Capecitabine/temodar. S/p multiple hepatic artery bland embolizations for hepatic tumor debulking. INR 1.8, PT 20.8, AST 57, ALT 27, AlkPhos 492. PLAN: Consulted hospice, appreciate recommendations. Will continue to discuss goals of care, respecting patient's timeline.Continue morphine CR 15mg BID at patient request, if patient has increased pain will resume former dose of 30mg BID. Continue morphine 30mg q4H PRN for breakthrough pain. Continue Zofran 8mg PO q8 PRN for nausea. Continue senna for opioid induced constipation.    Chronic Hypotension: As above, extensive w/u for hypotension with prev admission-- negative for adrenal insufficiency. IV Octreotide  showed improvement and pt sent home on Octreotide. Pt presented with AMS, weakness, dizziness, no syncope or LOC. D/c empiric abx (cefepime, vanc); no acute concerns for sepsis or infectious etiology. PLAN: continue midodrine 10mg TID. Continue octreotide injections 150mcg TID. Both meds will need to be d/c'd upon possible acceptance to hospice care.     Chronic microcytic anemia: Hgb on arrival = 6.6 with corrected 7.8 and ical 4.1. Received 1 unit pRBC. Increased to 9.1. Bili elevated (3.0), morphology showed moderate Schistocytes/ RBC fragments. Intermittent OP monitoring recommended, transfuse for hgb <7.      Hypocalcemia: On arrival calcium= 6.0 with corrected 7.8 and ical 4.1. He was replete with calcium carbonate 500mg PO x1. Remained in mild hypoglycemic range (correcting for hypoalbuminemia) consistent with baseline through remainder of hospitalization. Corrected calcium on 9/18= 8.1. Monitor and replete as necessary.     D/c instructions: Advised close OP monitoring of pts sodium levels and volume status while receiving continuous dextrose 20 infusion. While Na remained largely stable during admission, pt may need salt tablets to maintain normonatremia. Results of weekly labs will be communicated with oncology office. [facility] to f/u in approx 2w to reassess ongoing needs for care. Follow up apts were scheduled with myself and hematology.      Visit today was completed virtually.  He is accompanied by a family friend, who was also a physician at .  Friend was primary historian during visit and provided helpful updates with patient's condition and concerns since d/c. Patient reports that his edema  is worse since d/c-- both leg swelling and ascites. Increased leg swelling is making it difficult for pt to stand up/ down, move around, or complete normal ADL. His belly is swollen down to scrotum. He has become so thin you can count his ribs. Pt states that he enjoyed working with the oncology RD at  [facility], and wishes to see her again. His appetite is good. Friend notes that he is behaving very much like a liver pt with hypotension, electrolyte abnormalities, edema. GI not consulted during hospitalization. Diuretics contraindicated due to hypotension. Pt is considering coming off octreotide for several reasons- he is unable to self administer the SQ injections, his sugars are better off, and he simply doesn't wish to continue any longer. Friend req that I send CGM to pharmacy. Even if not covered by insurance, they will likely pay out of pocket bc pt is so tired of pricking his fingers and that would also allow family/ friends who are helping him to closely monitor. Friend also inquires if I can send Rx for oral steroids, with taper, to help control some symptoms-- specifically to aid with increased insulin production from tumor & subsequent low glucose. Hospice is scheduled to come to his home for consult either later this evening or tomorrow. Plans to schedule hem/onc f/u for next week. No more chemo or radiation is planned. Friend also states that he would like for pt to be set up for another diagnostic u/s given worsening ascites to see if any fluid pockets are available to drain. SBP is staying >100. They plan to ask hospice RN about a medical alert system of some sort or life alert button bc pt does still live alone. He is scheduled to have labs checked tomorrow. Patient tells me that he has a great support system with friend who accompanies him today and his two POA- Haylie & Maty Calzada. Lastly, he requests that I write a letter so that his brother is able to come visit pt in the [place]. A medical document stating pts health status is required by cecile to allow pt to leave country. Brother's name is Solomon Hawthorne.       Subjective      Review of Systems:   Review of Systems   Constitutional:  Positive for activity change. Negative for fever.   Respiratory:  Negative for shortness of breath.     Cardiovascular:  Positive for leg swelling.   Gastrointestinal:  Positive for abdominal distention.   Neurological:  Negative for dizziness, weakness and confusion.     I have reviewed and the following portions of the patient's history were updated as appropriate: past family history, past medical history, past social history, past surgical history and problem list.    Medications:     Current Outpatient Medications:     Allergy Relief 180 MG tablet, Take 1 tablet by mouth Daily., Disp: , Rfl:     atorvastatin (Lipitor) 20 MG tablet, Take 1 tablet by mouth Daily., Disp: 90 tablet, Rfl: 1    Blood Glucose Monitoring Suppl w/Device kit, 1 kit 4 (Four) Times a Day Before Meals & at Bedtime., Disp: 1 each, Rfl: 0    clobetasol (TEMOVATE) 0.05 % ointment, APPLY TO AFFECTED AREA TWICE DAILY X 2 WEEKS, Disp: , Rfl:     COMBIGAN 0.2-0.5 % ophthalmic solution, instill 1 drop into both eyes twice a day, Disp: , Rfl: 0    Continuous Blood Gluc Sensor (Dexcom G6 Sensor), Every 10 (Ten) Days., Disp: 1 each, Rfl: 0    cyclobenzaprine (FLEXERIL) 10 MG tablet, Take 1 tablet by mouth 3 (Three) Times a Day As Needed for Muscle Spasms., Disp: 21 tablet, Rfl: 0    Dupilumab (Dupixent) 300 MG/2ML solution prefilled syringe, every 14 (fourteen) days., Disp: , Rfl:     febuxostat (ULORIC) 80 MG tablet tablet, TAKE 1 TABLET BY MOUTH EVERY DAY, Disp: , Rfl:     ferrous sulfate (FerrouSul) 325 (65 FE) MG tablet, Take 1 tablet by mouth Daily With Breakfast., Disp: 30 tablet, Rfl: 2    glucose blood test strip, Use as instructed QID before meals and at bedtime, Disp: 100 each, Rfl: 3    Lancets (ACCU-CHEK MULTICLIX) lancets, Use as instructed QID before meals and at bedtime, Disp: 1 each, Rfl: 0    Lumigan 0.01 % ophthalmic drops, Administer 1 drop to both eyes Every Night., Disp: , Rfl:     ondansetron ODT (ZOFRAN-ODT) 8 MG disintegrating tablet, Take 1 tablet orally 30 minutes before Temozolomide and then up to every 8 hours as needed for  nausea/vomiting (Max: 3/day), Disp: , Rfl:     tamsulosin (FLOMAX) 0.4 MG capsule 24 hr capsule, TAKE ONE CAPSULE BY MOUTH DAILY, Disp: 90 capsule, Rfl: 0    vitamin D (ERGOCALCIFEROL) 1.25 MG (10636 UT) capsule capsule, Take 1 capsule by mouth 1 (One) Time Per Week., Disp: , Rfl:     Vitamin D, Cholecalciferol, (CHOLECALCIFEROL) 10 MCG (400 UNIT) tablet, Take 1 tablet by mouth Daily., Disp: , Rfl:     Xiidra 5 % ophthalmic solution, , Disp: , Rfl:     Allergies:   Allergies   Allergen Reactions    Latex Rash    Penicillins Rash       Objective     Physical Exam:  Vital Signs: There were no vitals filed for this visit.  There is no height or weight on file to calculate BMI.    Physical Exam  Constitutional:       General: He is not in acute distress.  Pulmonary:      Effort: Pulmonary effort is normal. No respiratory distress.   Neurological:      Mental Status: He is alert and oriented to person, place, and time.   Psychiatric:         Mood and Affect: Mood and affect normal.         Speech: Speech normal.         Behavior: Behavior normal.         Assessment / Plan      Assessment/Plan:   Diagnoses and all orders for this visit:    1. Encounter for support and coordination of transition of care (Primary)    2. Neuroendocrine carcinoma metastatic to multiple sites    3. Other specified hypotension    4. Hypoglycemia    5. Leg swelling    6. Malignant ascites    7. Microcytic anemia    8. Protein-calorie malnutrition, unspecified severity    9. Electrolyte abnormality (K, Ca, Na, Mg)    PLAN:  I will send orders for CGM device and related equipment. I will write letter recommending his brother, Solomon Hawthorne, come to visit pt due to current status/ general situation. I will touch base with Caro Arenas RD to see if patient can set up f/u with her for nutritional recommendations/ mgmt. I will order ultrasound guided paracentesis via IR at [facility] to see if any fluid can be drained from abd for symptomatic relief. I have  removed some meds from current active med list on file that were discontinued during admissions, and will ask MA to review med list at time of second d/c and update ours to reflect. I am more than happy to help with any needs that may arise now and moving forward for Adonay. At this point, it would be best to hold on anything further at this time- and regroup after pt has met with hospice and oncology. Scheduled for hospice consult this evening or tomorrow, onc next week. If pt decides to proceed with hospice at this time, they will take over all aspects of care and any needs pt has. If he is not  accepted to hospice service over the weekend, then I will be able to participate more in coordinating care and providing any orders that he needs-- such as: 1) Looking into [facility] for RN and PT at home (pt likely already approved and set up with "[employer] but I did receive message about signing PT/OT orders in [place] for Adonay earlier this week). 2) Discussing prednisone Rx. 3) Ordering hospital bed for pt's home. 4) Order some type of medical alert system. 5) consulting [facility]. If I have not heard from pt or family with update on upcoming two apts by Wednesday 9/27/23 I will plan to call him and touch base.        55 minutes spent for this visit today including face to face via video visit with pt & friend,  reviewing records from two hospitalizations, coordinating with care team members, and documentation in medical record.     Follow Up:   Return if symptoms worsen or fail to improve, for Next scheduled follow up.  Will schedule f/u apt next week after meeting with hospice and oncology     Any medications prescribed have been sent electronically to   [facility] [id number] - [place], [place] - [facility] [address] AT [facility] [address] - [phone] PH - [phone]   [address] [place] [place] [address]  Phone: [phone] Fax: [phone] 55  minutes were spent reviewing the patient's questionnaire, formulating a treatment plan, and relaying information to the patient via [employer].    TIANNA Ayon  Internal Medicine [place]   09/22/23  15:16 EDT

## 2023-09-23 DIAGNOSIS — N40.1 BENIGN LOCALIZED PROSTATIC HYPERPLASIA WITH LOWER URINARY TRACT SYMPTOMS (LUTS): ICD-10-CM

## 2023-09-23 RX ORDER — MORPHINE SULFATE 15 MG/1
15 TABLET, FILM COATED, EXTENDED RELEASE ORAL 2 TIMES DAILY
COMMUNITY
Start: 2023-09-19 | End: 2023-10-19

## 2023-09-23 RX ORDER — ACYCLOVIR 400 MG/1
1 TABLET ORAL 2 TIMES DAILY
Qty: 1 EACH | Refills: 0 | Status: SHIPPED | OUTPATIENT
Start: 2023-09-23 | End: 2023-09-23

## 2023-09-23 RX ORDER — MORPHINE SULFATE 30 MG/1
30 TABLET, FILM COATED, EXTENDED RELEASE ORAL EVERY 4 HOURS PRN
COMMUNITY
Start: 2023-08-01

## 2023-09-23 RX ORDER — MIDODRINE HYDROCHLORIDE 10 MG/1
10 TABLET ORAL 3 TIMES DAILY
Qty: 90 TABLET | Refills: 0 | COMMUNITY
Start: 2023-08-28 | End: 2023-09-27

## 2023-09-23 RX ORDER — SYRINGE AND NEEDLE,INSULIN,1ML 30 G X1/2"
SYRINGE, EMPTY DISPOSABLE MISCELLANEOUS
COMMUNITY
Start: 2023-08-28

## 2023-09-23 RX ORDER — SENNOSIDES A AND B 8.6 MG/1
8.6 TABLET, FILM COATED ORAL 2 TIMES DAILY
COMMUNITY
Start: 2023-08-28 | End: 2024-08-27

## 2023-09-23 RX ORDER — LANOLIN ALCOHOL/MO/W.PET/CERES
100 CREAM (GRAM) TOPICAL DAILY
COMMUNITY
Start: 2023-09-20

## 2023-09-23 RX ORDER — DEXTROSE 20 G/100ML
30 INJECTION, SOLUTION INTRAVENOUS
COMMUNITY
Start: 2023-09-18 | End: 2023-10-02

## 2023-09-23 RX ORDER — ISOPROPYL ALCOHOL 70 ML/100ML
SWAB TOPICAL
COMMUNITY
Start: 2023-09-19

## 2023-09-23 RX ORDER — NALOXONE HYDROCHLORIDE 4 MG/.1ML
1 SPRAY NASAL AS NEEDED
COMMUNITY
Start: 2023-08-28 | End: 2024-08-27

## 2023-09-23 RX ORDER — TAMSULOSIN HYDROCHLORIDE 0.4 MG/1
CAPSULE ORAL
Qty: 90 CAPSULE | Refills: 0 | Status: SHIPPED | OUTPATIENT
Start: 2023-09-23

## 2023-09-23 RX ORDER — OCTREOTIDE ACETATE 1000 UG/ML
150 INJECTION INTRAVENOUS 3 TIMES DAILY
COMMUNITY
Start: 2023-08-28

## 2023-09-23 RX ORDER — ACYCLOVIR 400 MG/1
1 TABLET ORAL
Qty: 3 EACH | Refills: 12 | Status: SHIPPED | OUTPATIENT
Start: 2023-09-23 | End: 2023-09-23

## 2023-09-23 RX ORDER — NICOTINE POLACRILEX 4 MG
37.5 LOZENGE BUCCAL AS NEEDED
COMMUNITY
Start: 2023-09-19

## 2023-09-23 RX ORDER — ONDANSETRON HYDROCHLORIDE 8 MG/1
8 TABLET, FILM COATED ORAL EVERY 8 HOURS PRN
COMMUNITY
Start: 2023-08-22

## 2023-09-23 RX ORDER — TRIAMCINOLONE ACETONIDE 55 UG/1
2 SPRAY, METERED NASAL DAILY
COMMUNITY

## 2023-09-28 ENCOUNTER — TELEPHONE (OUTPATIENT)
Dept: INTERNAL MEDICINE | Facility: CLINIC | Age: 63
End: 2023-09-28
Payer: COMMERCIAL

## 2023-09-28 NOTE — TELEPHONE ENCOUNTER
Attempted to reach pt back. Left VM to not stop his continuous dextrose infusion. OK to take medications with small sip of water. No food/ meals after midnight.

## 2023-09-28 NOTE — TELEPHONE ENCOUNTER
PATIENT CALLED AND WANTS TO KNOW THAT EVEN WITH NPO DOES HE STILL NEED TO TAKE midodrine (PROAMATINE) 10 MG tablet [[id number]] (Order 63 ,Morphine (MS CONTIN) 15 MG 12 hr tablet ,senna (SENOKOT) 8.6 MG tablet PATIENTS CALL BACK NUMBER [phone]

## 2023-09-28 NOTE — TELEPHONE ENCOUNTER
Attempted to reach pt. Left VM asking how he was feeling, update on hospice, and inquired about having a peritoneal tunneled catheter placed tomorrow in IR.

## 2023-09-28 NOTE — TELEPHONE ENCOUNTER
HUB to relay message, please give message below.    Please call Adonay. Let him know they will sedate him for his procedure tomorrow, so he needs to be NPO after midnight tonight. He will need a  to accompany him. No blood thinners (he is not currently on any). I spoke to him this morning about a drain for his belly to drain any recurrent fluid buildup in the abdomen-- for more information about it, there is a website with patient video at [employer] or if he/ family/friends have any questions they can IR before 5 this afternoon at [phone].

## 2023-09-28 NOTE — TELEPHONE ENCOUNTER
Please call Adonay. Let him know they will sedate him for his procedure tomorrow, so he needs to be NPO after midnight tonight. He will need a  to accompany him. No blood thinners (he is not currently on any). I spoke to him this morning about a drain for his belly to drain any recurrent fluid buildup in the abdomen-- for more information about it, there is a website with patient video at [email or web address] or if he/ family/friends have any questions they can IR before 5 this afternoon at [phone].

## 2023-09-28 NOTE — TELEPHONE ENCOUNTER
"Name: Kenn Hawthorne NINA \"Adonay\"    Relationship: Self    Best Callback Number: [phone]     HUB PROVIDED THE RELAY MESSAGE FROM THE OFFICE   PATIENT HAS FURTHER QUESTIONS AND WOULD LIKE A CALL BACK AT THE FOLLOWING PHONE NUMBER [phone]     ADDITIONAL INFORMATION: PATIENT WOULD LIKE TO CONFIRM WHAT PREP HE WILL NEED TO DO PRIOR TO HIS OPERATION TOMORROW. PATIENT STATES HE IS ALSO TAKING dextrose (D20W) 20 % infusion AND WOULD LIKE TO KNOW IF HE NEEDS TO DISCONTINUE USE AFTER MIDNIGHT. PATIENT WOULD LIKE TO CHECK IF HE CAN TAKE ALL MEDICATIONS AS NORMAL.     "

## 2023-09-29 ENCOUNTER — HOSPITAL ENCOUNTER (OUTPATIENT)
Dept: INTERVENTIONAL RADIOLOGY/VASCULAR | Facility: HOSPITAL | Age: 63
Discharge: HOME | End: 2023-09-29
Payer: COMMERCIAL

## 2023-09-29 VITALS
DIASTOLIC BLOOD PRESSURE: 72 MMHG | OXYGEN SATURATION: 95 % | TEMPERATURE: 97.7 F | WEIGHT: 178 LBS | HEIGHT: 65 IN | SYSTOLIC BLOOD PRESSURE: 95 MMHG | RESPIRATION RATE: 16 BRPM | HEART RATE: 70 BPM | BODY MASS INDEX: 29.66 KG/M2

## 2023-09-29 DIAGNOSIS — R18.0 MALIGNANT ASCITES: ICD-10-CM

## 2023-09-29 PROCEDURE — 76942 ECHO GUIDE FOR BIOPSY: CPT

## 2023-09-29 RX ORDER — DEXAMETHASONE 2 MG/1
2 TABLET ORAL DAILY
COMMUNITY
Start: 2023-09-28

## 2023-09-29 RX ORDER — LACTULOSE 10 G/15ML
SOLUTION ORAL
COMMUNITY
Start: 2023-09-25

## 2023-09-29 NOTE — POST-PROCEDURE NOTE
The following procedure was performed: Pt chose against para. I respect his choice.    Please see corresponding Radiology report for in detail procedural information. The Radiology report will be dictated shortly, if not done so already. Please see the IR RN note for the information regarding medicines administered if any, [place]-procedural vitals and I/O information.

## 2023-09-29 NOTE — NURSING NOTE
Pt discharged from ir dept. After discussing results of bedside ultrasound with Dr. Garcia, patient and family decided not to proceed with procedure due to low volume of fluid visualized on ultrasound. Patient did not feel that it would benefit him enough. Pt reports that the swelling has decreased significantly since he was last seen at . Pt left unit via wheelchair with assistance of his family.

## 2023-09-29 NOTE — PRE-PROCEDURE NOTE
[facility]   Vascular Interventional Radiology  History & Physicial        Patient Name:Kenn Hawthorne    : 1960    MRN: [id number]    Primary Care Physician: Alma Castanon APRN    Referring Physician: ESTEBAN Bergman     Date of admission: 2023    Subjective     Reason for Consult: Para    History of Present Illness     Kenn Hawthorne is a 63 y.o. male referred to IR as noted above.      Active Hospital Problems:  There are no active hospital problems to display for this patient.      Personal History     Past Medical History:   Diagnosis Date    Allergic     Arthritis     Diabetes mellitus     Eczema     Glaucoma, left eye     Hypertension        Past Surgical History:   Procedure Laterality Date    BICEPS TENDON REPAIR      COLONOSCOPY      EYE SURGERY         Family History: His family history includes Cancer in his mother; Stroke in his father.     Social History: He  reports that he quit smoking about 11 years ago. His smoking use included cigarettes. He has a 3.75 pack-year smoking history. He has never used smokeless tobacco. He reports current alcohol use. He reports that he does not use drugs.    Home Medications:  Blood Glucose Monitoring Suppl, Easy Touch Alcohol Prep Medium, Insulin Syringe/Needle, Morphine, Triamcinolone Acetonide, Vitamin D (Cholecalciferol), accu-chek multiclix, atorvastatin, bimatoprost, brimonidine-timolol, dexAMETHasone, dextrose, febuxostat, fexofenadine, glucose blood, lactulose, midodrine, naloxone, octreotide, ondansetron, ondansetron ODT, senna, tamsulosin, and thiamine    Current Medications:  No current facility-administered medications for this encounter.     Allergies:  Allergies   Allergen Reactions    Latex Rash    Penicillins Rash       Review of Systems    IR Procedure pertinent significant findings are mentioned in the PMH and PSH above.    Objective     Visit Vitals  BP 95/72 (BP Location: Right arm, Patient Position: Lying)  "  Pulse 70   Temp 97.7 °F (36.5 °C) (Temporal)   Resp 16   Ht 165.1 cm (65\")   Wt 80.7 kg (178 lb)   SpO2 95%   BMI 29.62 kg/m²        Physical Exam    A&Ox3.   Able to communicate  No Apparent Distress  Average physique   CVS: VS as noted. Chart reviewed. Stable for the procedure.  Respiratory: Non labored breathing. No signs of respiratory compromise.    Result Review      I have personally reviewed the results from the time of this admission to 9/29/2023 14:47 EDT and agree with these findings.  [x]  Laboratory  []  Microbiology  [x]  Radiology  []  EKG/Telemetry   []  Cardiology/Vascular   []  Pathology  []  Old records  []  Other:    Most notable findings include: As noted:                      CrCl cannot be calculated (Patient's most recent lab result is older than the maximum 30 days allowed.). No results found for: CREATININE    SARS-CoV-2, SANDIP   Date Value Ref Range Status   09/12/2023 Not Detected Not Detected Final        No results found for: PREGTESTUR, PREGSERUM, HCG, HCGQUANT     ASA SCALE ASSESSMENT (applicable ONLY if sedation planned):        MALLAMPATI CLASSIFICATION (applicable ONLY if sedation planned):       Assessment / Plan     Kenn Hawthorne is a 63 y.o. male referred to the IR service with above problem.    Plan:   As above.    Notice: The note was created before the performance of the procedure. It might have been left in the pending status and signed off after the procedure. The time stamp on the note may be misleading.    Ryan Garcia MD   Vascular Interventional Radiology  09/29/23   10:47 AM EDT     "

## 2023-10-13 ENCOUNTER — TELEPHONE (OUTPATIENT)
Dept: INTERNAL MEDICINE | Facility: CLINIC | Age: 63
End: 2023-10-13

## 2023-10-15 DIAGNOSIS — E11.59 TYPE 2 DIABETES MELLITUS WITH OTHER CIRCULATORY COMPLICATION, WITHOUT LONG-TERM CURRENT USE OF INSULIN: ICD-10-CM

## 2023-10-16 RX ORDER — METFORMIN HYDROCHLORIDE 500 MG/1
1000 TABLET, EXTENDED RELEASE ORAL 2 TIMES DAILY
Qty: 360 TABLET | Refills: 1 | OUTPATIENT
Start: 2023-10-16
